# Patient Record
Sex: FEMALE | Race: BLACK OR AFRICAN AMERICAN | NOT HISPANIC OR LATINO
[De-identification: names, ages, dates, MRNs, and addresses within clinical notes are randomized per-mention and may not be internally consistent; named-entity substitution may affect disease eponyms.]

---

## 2024-01-01 ENCOUNTER — RESULT REVIEW (OUTPATIENT)
Age: 59
End: 2024-01-01

## 2024-01-01 ENCOUNTER — NON-APPOINTMENT (OUTPATIENT)
Age: 59
End: 2024-01-01

## 2024-01-01 ENCOUNTER — INPATIENT (INPATIENT)
Facility: HOSPITAL | Age: 59
LOS: 3 days | End: 2024-12-06
Attending: STUDENT IN AN ORGANIZED HEALTH CARE EDUCATION/TRAINING PROGRAM | Admitting: STUDENT IN AN ORGANIZED HEALTH CARE EDUCATION/TRAINING PROGRAM
Payer: MEDICAID

## 2024-01-01 ENCOUNTER — APPOINTMENT (OUTPATIENT)
Dept: HEMATOLOGY ONCOLOGY | Facility: CLINIC | Age: 59
End: 2024-01-01

## 2024-01-01 ENCOUNTER — OUTPATIENT (OUTPATIENT)
Dept: OUTPATIENT SERVICES | Facility: HOSPITAL | Age: 59
LOS: 1 days | Discharge: HOME | End: 2024-01-01

## 2024-01-01 ENCOUNTER — APPOINTMENT (OUTPATIENT)
Dept: INFUSION THERAPY | Facility: HOSPITAL | Age: 59
End: 2024-01-01

## 2024-01-01 ENCOUNTER — OUTPATIENT (OUTPATIENT)
Dept: OUTPATIENT SERVICES | Facility: HOSPITAL | Age: 59
LOS: 1 days | End: 2024-01-01

## 2024-01-01 VITALS
SYSTOLIC BLOOD PRESSURE: 110 MMHG | DIASTOLIC BLOOD PRESSURE: 69 MMHG | RESPIRATION RATE: 28 BRPM | WEIGHT: 212.97 LBS | HEIGHT: 65 IN | HEART RATE: 144 BPM | OXYGEN SATURATION: 95 % | TEMPERATURE: 100 F

## 2024-01-01 DIAGNOSIS — Z90.49 ACQUIRED ABSENCE OF OTHER SPECIFIED PARTS OF DIGESTIVE TRACT: Chronic | ICD-10-CM

## 2024-01-01 DIAGNOSIS — C83.30 DIFFUSE LARGE B-CELL LYMPHOMA, UNSPECIFIED SITE: ICD-10-CM

## 2024-01-01 DIAGNOSIS — C84.40 PERIPHERAL T-CELL LYMPHOMA, NOT ELSEWHERE CLASSIFIED, UNSPECIFIED SITE: ICD-10-CM

## 2024-01-01 DIAGNOSIS — E78.49 OTHER HYPERLIPIDEMIA: ICD-10-CM

## 2024-01-01 DIAGNOSIS — I10 ESSENTIAL (PRIMARY) HYPERTENSION: ICD-10-CM

## 2024-01-01 DIAGNOSIS — E11.10 TYPE 2 DIABETES MELLITUS WITH KETOACIDOSIS WITHOUT COMA: ICD-10-CM

## 2024-01-01 DIAGNOSIS — R06.02 SHORTNESS OF BREATH: ICD-10-CM

## 2024-01-01 DIAGNOSIS — Z51.89 ENCOUNTER FOR OTHER SPECIFIED AFTERCARE: ICD-10-CM

## 2024-01-01 DIAGNOSIS — Z51.11 ENCOUNTER FOR ANTINEOPLASTIC CHEMOTHERAPY: ICD-10-CM

## 2024-01-01 DIAGNOSIS — C85.90 NON-HODGKIN LYMPHOMA, UNSPECIFIED, UNSPECIFIED SITE: ICD-10-CM

## 2024-01-01 DIAGNOSIS — E11.65 TYPE 2 DIABETES MELLITUS WITH HYPERGLYCEMIA: ICD-10-CM

## 2024-01-01 DIAGNOSIS — E86.0 DEHYDRATION: ICD-10-CM

## 2024-01-01 DIAGNOSIS — I95.9 HYPOTENSION, UNSPECIFIED: ICD-10-CM

## 2024-01-01 DIAGNOSIS — R11.2 NAUSEA WITH VOMITING, UNSPECIFIED: ICD-10-CM

## 2024-01-01 DIAGNOSIS — E11.9 TYPE 2 DIABETES MELLITUS WITHOUT COMPLICATIONS: ICD-10-CM

## 2024-01-01 LAB
-  AMPICILLIN/SULBACTAM: SIGNIFICANT CHANGE UP
-  AMPICILLIN: SIGNIFICANT CHANGE UP
-  AZTREONAM: SIGNIFICANT CHANGE UP
-  CEFAZOLIN: SIGNIFICANT CHANGE UP
-  CEFEPIME: SIGNIFICANT CHANGE UP
-  CEFOXITIN: SIGNIFICANT CHANGE UP
-  CEFTRIAXONE: SIGNIFICANT CHANGE UP
-  CIPROFLOXACIN: SIGNIFICANT CHANGE UP
-  ERTAPENEM: SIGNIFICANT CHANGE UP
-  GENTAMICIN: SIGNIFICANT CHANGE UP
-  IMIPENEM: SIGNIFICANT CHANGE UP
-  LEVOFLOXACIN: SIGNIFICANT CHANGE UP
-  MEROPENEM: SIGNIFICANT CHANGE UP
-  PIPERACILLIN/TAZOBACTAM: SIGNIFICANT CHANGE UP
-  TOBRAMYCIN: SIGNIFICANT CHANGE UP
-  TRIMETHOPRIM/SULFAMETHOXAZOLE: SIGNIFICANT CHANGE UP
24R-OH-CALCIDIOL SERPL-MCNC: 40.7 NG/ML — SIGNIFICANT CHANGE UP (ref 30–80)
A1C WITH ESTIMATED AVERAGE GLUCOSE RESULT: 10.6 % — HIGH (ref 4–5.6)
A1C WITH ESTIMATED AVERAGE GLUCOSE RESULT: 12.2 % — HIGH (ref 4–5.6)
ACANTHOCYTES BLD QL SMEAR: SLIGHT — SIGNIFICANT CHANGE UP
ADD ON TEST-SPECIMEN IN LAB: SIGNIFICANT CHANGE UP
ALBUMIN SERPL ELPH-MCNC: 1.5 G/DL — LOW (ref 3.3–5)
ALBUMIN SERPL ELPH-MCNC: 1.7 G/DL — LOW (ref 3.3–5)
ALBUMIN SERPL ELPH-MCNC: 1.7 G/DL — LOW (ref 3.3–5)
ALBUMIN SERPL ELPH-MCNC: 1.8 G/DL — LOW (ref 3.3–5)
ALBUMIN SERPL ELPH-MCNC: 1.9 G/DL — LOW (ref 3.3–5)
ALBUMIN SERPL ELPH-MCNC: 2 G/DL — LOW (ref 3.3–5)
ALBUMIN SERPL ELPH-MCNC: 2.1 G/DL — LOW (ref 3.3–5)
ALBUMIN SERPL ELPH-MCNC: 2.2 G/DL — LOW (ref 3.3–5)
ALBUMIN SERPL ELPH-MCNC: 2.3 G/DL — LOW (ref 3.3–5)
ALBUMIN SERPL ELPH-MCNC: 2.4 G/DL — LOW (ref 3.3–5)
ALBUMIN SERPL ELPH-MCNC: 2.9 G/DL — LOW (ref 3.3–5)
ALBUMIN SERPL ELPH-MCNC: 3 G/DL — LOW (ref 3.3–5)
ALP SERPL-CCNC: 104 U/L — SIGNIFICANT CHANGE UP (ref 40–120)
ALP SERPL-CCNC: 104 U/L — SIGNIFICANT CHANGE UP (ref 40–120)
ALP SERPL-CCNC: 108 U/L — SIGNIFICANT CHANGE UP (ref 40–120)
ALP SERPL-CCNC: 108 U/L — SIGNIFICANT CHANGE UP (ref 40–120)
ALP SERPL-CCNC: 113 U/L — SIGNIFICANT CHANGE UP (ref 40–120)
ALP SERPL-CCNC: 137 U/L — HIGH (ref 40–120)
ALP SERPL-CCNC: 76 U/L — SIGNIFICANT CHANGE UP (ref 40–120)
ALP SERPL-CCNC: 77 U/L — SIGNIFICANT CHANGE UP (ref 40–120)
ALP SERPL-CCNC: 77 U/L — SIGNIFICANT CHANGE UP (ref 40–120)
ALP SERPL-CCNC: 79 U/L — SIGNIFICANT CHANGE UP (ref 40–120)
ALP SERPL-CCNC: 80 U/L — SIGNIFICANT CHANGE UP (ref 40–120)
ALP SERPL-CCNC: 82 U/L — SIGNIFICANT CHANGE UP (ref 40–120)
ALP SERPL-CCNC: 82 U/L — SIGNIFICANT CHANGE UP (ref 40–120)
ALP SERPL-CCNC: 83 U/L — SIGNIFICANT CHANGE UP (ref 40–120)
ALP SERPL-CCNC: 83 U/L — SIGNIFICANT CHANGE UP (ref 40–120)
ALP SERPL-CCNC: 84 U/L — SIGNIFICANT CHANGE UP (ref 40–120)
ALP SERPL-CCNC: 85 U/L — SIGNIFICANT CHANGE UP (ref 40–120)
ALP SERPL-CCNC: 85 U/L — SIGNIFICANT CHANGE UP (ref 40–120)
ALP SERPL-CCNC: 87 U/L — SIGNIFICANT CHANGE UP (ref 40–120)
ALP SERPL-CCNC: 87 U/L — SIGNIFICANT CHANGE UP (ref 40–120)
ALP SERPL-CCNC: 89 U/L — SIGNIFICANT CHANGE UP (ref 40–120)
ALP SERPL-CCNC: 94 U/L — SIGNIFICANT CHANGE UP (ref 40–120)
ALT FLD-CCNC: 15 U/L — SIGNIFICANT CHANGE UP (ref 4–33)
ALT FLD-CCNC: 16 U/L — SIGNIFICANT CHANGE UP (ref 4–33)
ALT FLD-CCNC: 16 U/L — SIGNIFICANT CHANGE UP (ref 4–33)
ALT FLD-CCNC: 17 U/L — SIGNIFICANT CHANGE UP (ref 4–33)
ALT FLD-CCNC: 18 U/L — SIGNIFICANT CHANGE UP (ref 4–33)
ALT FLD-CCNC: 19 U/L — SIGNIFICANT CHANGE UP (ref 4–33)
ALT FLD-CCNC: 20 U/L — SIGNIFICANT CHANGE UP (ref 4–33)
ALT FLD-CCNC: 21 U/L — SIGNIFICANT CHANGE UP (ref 4–33)
ALT FLD-CCNC: 25 U/L — SIGNIFICANT CHANGE UP (ref 4–33)
ALT FLD-CCNC: 25 U/L — SIGNIFICANT CHANGE UP (ref 4–33)
ALT FLD-CCNC: 27 U/L — SIGNIFICANT CHANGE UP (ref 4–33)
ALT FLD-CCNC: 30 U/L — SIGNIFICANT CHANGE UP (ref 4–33)
ALT FLD-CCNC: 33 U/L — SIGNIFICANT CHANGE UP (ref 4–33)
ALT FLD-CCNC: 37 U/L — SIGNIFICANT CHANGE UP (ref 10–45)
ALT FLD-CCNC: <5 U/L — SIGNIFICANT CHANGE UP (ref 4–33)
ALT FLD-CCNC: SIGNIFICANT CHANGE UP U/L (ref 4–33)
ALT FLD-CCNC: SIGNIFICANT CHANGE UP U/L (ref 4–33)
AMMONIA BLD-MCNC: 17 UMOL/L — SIGNIFICANT CHANGE UP (ref 11–55)
AMYLASE P1 CFR SERPL: 136 U/L — HIGH (ref 25–125)
ANION GAP SERPL CALC-SCNC: 12 MMOL/L — SIGNIFICANT CHANGE UP (ref 7–14)
ANION GAP SERPL CALC-SCNC: 13 MMOL/L — SIGNIFICANT CHANGE UP (ref 7–14)
ANION GAP SERPL CALC-SCNC: 14 MMOL/L — SIGNIFICANT CHANGE UP (ref 7–14)
ANION GAP SERPL CALC-SCNC: 15 MMOL/L — HIGH (ref 7–14)
ANION GAP SERPL CALC-SCNC: 15 MMOL/L — HIGH (ref 7–14)
ANION GAP SERPL CALC-SCNC: 18 MMOL/L — HIGH (ref 7–14)
ANION GAP SERPL CALC-SCNC: 19 MMOL/L — HIGH (ref 7–14)
ANION GAP SERPL CALC-SCNC: 20 MMOL/L — HIGH (ref 7–14)
ANION GAP SERPL CALC-SCNC: 21 MMOL/L — HIGH (ref 7–14)
ANION GAP SERPL CALC-SCNC: 23 MMOL/L — HIGH (ref 7–14)
ANION GAP SERPL CALC-SCNC: 24 MMOL/L — HIGH (ref 7–14)
ANION GAP SERPL CALC-SCNC: 26 MMOL/L — HIGH (ref 7–14)
ANION GAP SERPL CALC-SCNC: 27 MMOL/L — HIGH (ref 5–17)
ANION GAP SERPL CALC-SCNC: 28 MMOL/L — HIGH (ref 7–14)
ANION GAP SERPL CALC-SCNC: 30 MMOL/L — HIGH (ref 7–14)
ANION GAP SERPL CALC-SCNC: 31 MMOL/L — HIGH (ref 7–14)
ANION GAP SERPL CALC-SCNC: 31 MMOL/L — HIGH (ref 7–14)
ANION GAP SERPL CALC-SCNC: 35 MMOL/L — HIGH (ref 7–14)
ANION GAP SERPL CALC-SCNC: >27 MMOL/L — HIGH (ref 7–14)
ANISOCYTOSIS BLD QL: SLIGHT — SIGNIFICANT CHANGE UP
ANISOCYTOSIS BLD QL: SLIGHT — SIGNIFICANT CHANGE UP
APPEARANCE UR: CLEAR — SIGNIFICANT CHANGE UP
APTT BLD: 24.7 SEC — SIGNIFICANT CHANGE UP (ref 24.5–35.6)
APTT BLD: 26.6 SEC — SIGNIFICANT CHANGE UP (ref 24.5–35.6)
APTT BLD: 26.8 SEC — SIGNIFICANT CHANGE UP (ref 24.5–35.6)
APTT BLD: 28.1 SEC — SIGNIFICANT CHANGE UP (ref 24.5–35.6)
APTT BLD: 28.1 SEC — SIGNIFICANT CHANGE UP (ref 24.5–35.6)
APTT BLD: 30.6 SEC — SIGNIFICANT CHANGE UP (ref 24.5–35.6)
APTT BLD: 30.7 SEC — SIGNIFICANT CHANGE UP (ref 24.5–35.6)
APTT BLD: 32.8 SEC — SIGNIFICANT CHANGE UP (ref 24.5–35.6)
APTT BLD: 34.1 SEC — SIGNIFICANT CHANGE UP (ref 24.5–35.6)
APTT BLD: 35.1 SEC — SIGNIFICANT CHANGE UP (ref 24.5–35.6)
APTT BLD: 37.2 SEC — HIGH (ref 24.5–35.6)
AST SERPL-CCNC: 10 U/L — SIGNIFICANT CHANGE UP (ref 4–32)
AST SERPL-CCNC: 124 U/L — HIGH (ref 4–32)
AST SERPL-CCNC: 13 U/L — SIGNIFICANT CHANGE UP (ref 4–32)
AST SERPL-CCNC: 15 U/L — SIGNIFICANT CHANGE UP (ref 4–32)
AST SERPL-CCNC: 20 U/L — SIGNIFICANT CHANGE UP (ref 4–32)
AST SERPL-CCNC: 23 U/L — SIGNIFICANT CHANGE UP (ref 4–32)
AST SERPL-CCNC: 27 U/L — SIGNIFICANT CHANGE UP (ref 4–32)
AST SERPL-CCNC: 29 U/L — SIGNIFICANT CHANGE UP (ref 4–32)
AST SERPL-CCNC: 35 U/L — HIGH (ref 4–32)
AST SERPL-CCNC: 38 U/L — HIGH (ref 4–32)
AST SERPL-CCNC: 41 U/L — HIGH (ref 10–40)
AST SERPL-CCNC: 42 U/L — HIGH (ref 4–32)
AST SERPL-CCNC: 46 U/L — HIGH (ref 4–32)
AST SERPL-CCNC: 50 U/L — HIGH (ref 4–32)
AST SERPL-CCNC: 77 U/L — HIGH (ref 4–32)
AST SERPL-CCNC: 8 U/L — SIGNIFICANT CHANGE UP (ref 4–32)
AST SERPL-CCNC: 8 U/L — SIGNIFICANT CHANGE UP (ref 4–32)
AST SERPL-CCNC: 9 U/L — SIGNIFICANT CHANGE UP (ref 4–32)
AST SERPL-CCNC: <5 U/L — SIGNIFICANT CHANGE UP (ref 4–32)
AST SERPL-CCNC: SIGNIFICANT CHANGE UP U/L (ref 4–32)
AST SERPL-CCNC: SIGNIFICANT CHANGE UP U/L (ref 4–32)
B-OH-BUTYR SERPL-SCNC: 0.2 MMOL/L — SIGNIFICANT CHANGE UP (ref 0–0.4)
B-OH-BUTYR SERPL-SCNC: 0.3 MMOL/L — SIGNIFICANT CHANGE UP (ref 0–0.4)
B-OH-BUTYR SERPL-SCNC: 0.3 MMOL/L — SIGNIFICANT CHANGE UP (ref 0–0.4)
B-OH-BUTYR SERPL-SCNC: 0.4 MMOL/L — SIGNIFICANT CHANGE UP (ref 0–0.4)
B-OH-BUTYR SERPL-SCNC: 1.4 MMOL/L — HIGH (ref 0–0.4)
B-OH-BUTYR SERPL-SCNC: 2.1 MMOL/L — HIGH (ref 0–0.4)
B-OH-BUTYR SERPL-SCNC: 6.2 MMOL/L — HIGH (ref 0–0.4)
B-OH-BUTYR SERPL-SCNC: <0 MMOL/L — SIGNIFICANT CHANGE UP (ref 0–0.4)
BACTERIA # UR AUTO: ABNORMAL /HPF
BASOPHILS # BLD AUTO: 0 K/UL — SIGNIFICANT CHANGE UP (ref 0–0.2)
BASOPHILS # BLD AUTO: 0.01 K/UL — SIGNIFICANT CHANGE UP (ref 0–0.2)
BASOPHILS # BLD AUTO: 0.02 K/UL — SIGNIFICANT CHANGE UP (ref 0–0.2)
BASOPHILS # BLD AUTO: 0.03 K/UL — SIGNIFICANT CHANGE UP (ref 0–0.2)
BASOPHILS # BLD AUTO: 0.03 K/UL — SIGNIFICANT CHANGE UP (ref 0–0.2)
BASOPHILS NFR BLD AUTO: 0 % — SIGNIFICANT CHANGE UP (ref 0–2)
BASOPHILS NFR BLD AUTO: 0.6 % — SIGNIFICANT CHANGE UP (ref 0–2)
BASOPHILS NFR BLD AUTO: 1.8 % — SIGNIFICANT CHANGE UP (ref 0–2)
BASOPHILS NFR BLD AUTO: 2.6 % — HIGH (ref 0–2)
BASOPHILS NFR BLD AUTO: 2.6 % — HIGH (ref 0–2)
BASOPHILS NFR BLD AUTO: 3.1 % — HIGH (ref 0–2)
BASOPHILS NFR BLD AUTO: 3.3 % — HIGH (ref 0–2)
BASOPHILS NFR BLD AUTO: 3.4 % — HIGH (ref 0–2)
BASOPHILS NFR BLD AUTO: 4.5 % — HIGH (ref 0–2)
BASOPHILS NFR BLD AUTO: 4.7 % — HIGH (ref 0–2)
BASOPHILS NFR BLD AUTO: 9.1 % — HIGH (ref 0–2)
BILIRUB SERPL-MCNC: 0.4 MG/DL — SIGNIFICANT CHANGE UP (ref 0.2–1.2)
BILIRUB SERPL-MCNC: 0.5 MG/DL — SIGNIFICANT CHANGE UP (ref 0.2–1.2)
BILIRUB SERPL-MCNC: 0.6 MG/DL — SIGNIFICANT CHANGE UP (ref 0.2–1.2)
BILIRUB SERPL-MCNC: 0.7 MG/DL — SIGNIFICANT CHANGE UP (ref 0.2–1.2)
BILIRUB SERPL-MCNC: 0.8 MG/DL — SIGNIFICANT CHANGE UP (ref 0.2–1.2)
BILIRUB SERPL-MCNC: 0.9 MG/DL — SIGNIFICANT CHANGE UP (ref 0.2–1.2)
BILIRUB SERPL-MCNC: 0.9 MG/DL — SIGNIFICANT CHANGE UP (ref 0.2–1.2)
BILIRUB SERPL-MCNC: 1 MG/DL — SIGNIFICANT CHANGE UP (ref 0.2–1.2)
BILIRUB UR-MCNC: NEGATIVE — SIGNIFICANT CHANGE UP
BLD GP AB SCN SERPL QL: NEGATIVE — SIGNIFICANT CHANGE UP
BLOOD GAS ARTERIAL COMPREHENSIVE RESULT: SIGNIFICANT CHANGE UP
BLOOD GAS VENOUS COMPREHENSIVE RESULT: SIGNIFICANT CHANGE UP
BUN SERPL-MCNC: 11 MG/DL — SIGNIFICANT CHANGE UP (ref 7–23)
BUN SERPL-MCNC: 13 MG/DL — SIGNIFICANT CHANGE UP (ref 7–23)
BUN SERPL-MCNC: 14 MG/DL — SIGNIFICANT CHANGE UP (ref 7–23)
BUN SERPL-MCNC: 16 MG/DL — SIGNIFICANT CHANGE UP (ref 7–23)
BUN SERPL-MCNC: 19 MG/DL — SIGNIFICANT CHANGE UP (ref 7–23)
BUN SERPL-MCNC: 2 MG/DL — LOW (ref 7–23)
BUN SERPL-MCNC: 21 MG/DL — SIGNIFICANT CHANGE UP (ref 7–23)
BUN SERPL-MCNC: 22 MG/DL — SIGNIFICANT CHANGE UP (ref 7–23)
BUN SERPL-MCNC: 3 MG/DL — LOW (ref 7–23)
BUN SERPL-MCNC: 3 MG/DL — LOW (ref 7–23)
BUN SERPL-MCNC: 4 MG/DL — LOW (ref 7–23)
BUN SERPL-MCNC: 4 MG/DL — LOW (ref 7–23)
BUN SERPL-MCNC: 5 MG/DL — LOW (ref 7–23)
BUN SERPL-MCNC: 6 MG/DL — LOW (ref 7–23)
BUN SERPL-MCNC: 7 MG/DL — SIGNIFICANT CHANGE UP (ref 7–23)
BUN SERPL-MCNC: 9 MG/DL — SIGNIFICANT CHANGE UP (ref 7–23)
BUN SERPL-MCNC: <2 MG/DL — LOW (ref 7–23)
BURR CELLS BLD QL SMEAR: PRESENT — SIGNIFICANT CHANGE UP
BURR CELLS BLD QL SMEAR: PRESENT — SIGNIFICANT CHANGE UP
C DIFF GDH STL QL: NEGATIVE — SIGNIFICANT CHANGE UP
C DIFF GDH STL QL: SIGNIFICANT CHANGE UP
CA-I BLD-SCNC: 0.9 MMOL/L — LOW (ref 1.15–1.29)
CA-I BLD-SCNC: 0.94 MMOL/L — LOW (ref 1.15–1.29)
CA-I BLD-SCNC: 0.95 MMOL/L — LOW (ref 1.15–1.29)
CA-I BLD-SCNC: 0.96 MMOL/L — LOW (ref 1.15–1.29)
CA-I BLD-SCNC: 0.98 MMOL/L — LOW (ref 1.15–1.29)
CALCIUM SERPL-MCNC: 10.1 MG/DL — SIGNIFICANT CHANGE UP (ref 8.4–10.5)
CALCIUM SERPL-MCNC: 6.5 MG/DL — CRITICAL LOW (ref 8.4–10.5)
CALCIUM SERPL-MCNC: 6.6 MG/DL — LOW (ref 8.4–10.5)
CALCIUM SERPL-MCNC: 6.9 MG/DL — LOW (ref 8.4–10.5)
CALCIUM SERPL-MCNC: 7.1 MG/DL — LOW (ref 8.4–10.5)
CALCIUM SERPL-MCNC: 7.3 MG/DL — LOW (ref 8.4–10.5)
CALCIUM SERPL-MCNC: 7.5 MG/DL — LOW (ref 8.4–10.5)
CALCIUM SERPL-MCNC: 7.5 MG/DL — LOW (ref 8.4–10.5)
CALCIUM SERPL-MCNC: 7.7 MG/DL — LOW (ref 8.4–10.5)
CALCIUM SERPL-MCNC: 7.8 MG/DL — LOW (ref 8.4–10.5)
CALCIUM SERPL-MCNC: 7.9 MG/DL — LOW (ref 8.4–10.5)
CALCIUM SERPL-MCNC: 8.2 MG/DL — LOW (ref 8.4–10.5)
CALCIUM SERPL-MCNC: 8.4 MG/DL — SIGNIFICANT CHANGE UP (ref 8.4–10.5)
CALCIUM SERPL-MCNC: 8.5 MG/DL — SIGNIFICANT CHANGE UP (ref 8.4–10.5)
CALCIUM SERPL-MCNC: 8.8 MG/DL — SIGNIFICANT CHANGE UP (ref 8.4–10.5)
CALCIUM SERPL-MCNC: 8.9 MG/DL — SIGNIFICANT CHANGE UP (ref 8.4–10.5)
CALCIUM SERPL-MCNC: 9 MG/DL — SIGNIFICANT CHANGE UP (ref 8.4–10.5)
CALCIUM SERPL-MCNC: 9 MG/DL — SIGNIFICANT CHANGE UP (ref 8.4–10.5)
CALCIUM SERPL-MCNC: 9.2 MG/DL — SIGNIFICANT CHANGE UP (ref 8.4–10.5)
CHLORIDE SERPL-SCNC: 101 MMOL/L — SIGNIFICANT CHANGE UP (ref 98–107)
CHLORIDE SERPL-SCNC: 103 MMOL/L — SIGNIFICANT CHANGE UP (ref 98–107)
CHLORIDE SERPL-SCNC: 104 MMOL/L — SIGNIFICANT CHANGE UP (ref 98–107)
CHLORIDE SERPL-SCNC: 104 MMOL/L — SIGNIFICANT CHANGE UP (ref 98–107)
CHLORIDE SERPL-SCNC: 106 MMOL/L — SIGNIFICANT CHANGE UP (ref 98–107)
CHLORIDE SERPL-SCNC: 107 MMOL/L — SIGNIFICANT CHANGE UP (ref 98–107)
CHLORIDE SERPL-SCNC: 110 MMOL/L — HIGH (ref 98–107)
CHLORIDE SERPL-SCNC: 112 MMOL/L — HIGH (ref 98–107)
CHLORIDE SERPL-SCNC: 113 MMOL/L — HIGH (ref 98–107)
CHLORIDE SERPL-SCNC: 115 MMOL/L — HIGH (ref 98–107)
CHLORIDE SERPL-SCNC: 115 MMOL/L — HIGH (ref 98–107)
CHLORIDE SERPL-SCNC: 116 MMOL/L — HIGH (ref 98–107)
CHLORIDE SERPL-SCNC: 116 MMOL/L — HIGH (ref 98–107)
CHLORIDE SERPL-SCNC: 117 MMOL/L — HIGH (ref 98–107)
CHLORIDE SERPL-SCNC: 118 MMOL/L — HIGH (ref 98–107)
CHLORIDE SERPL-SCNC: 119 MMOL/L — HIGH (ref 98–107)
CHLORIDE SERPL-SCNC: 120 MMOL/L — HIGH (ref 98–107)
CHLORIDE SERPL-SCNC: 120 MMOL/L — HIGH (ref 98–107)
CHLORIDE SERPL-SCNC: 121 MMOL/L — HIGH (ref 98–107)
CHLORIDE SERPL-SCNC: 82 MMOL/L — LOW (ref 96–108)
CHLORIDE SERPL-SCNC: 93 MMOL/L — LOW (ref 98–107)
CHOLEST SERPL-MCNC: 84 MG/DL — SIGNIFICANT CHANGE UP
CO2 SERPL-SCNC: 10 MMOL/L — CRITICAL LOW (ref 22–31)
CO2 SERPL-SCNC: 11 MMOL/L — LOW (ref 22–31)
CO2 SERPL-SCNC: 13 MMOL/L — LOW (ref 22–31)
CO2 SERPL-SCNC: 13 MMOL/L — LOW (ref 22–31)
CO2 SERPL-SCNC: 14 MMOL/L — LOW (ref 22–31)
CO2 SERPL-SCNC: 15 MMOL/L — LOW (ref 22–31)
CO2 SERPL-SCNC: 15 MMOL/L — LOW (ref 22–31)
CO2 SERPL-SCNC: 16 MMOL/L — LOW (ref 22–31)
CO2 SERPL-SCNC: 16 MMOL/L — LOW (ref 22–31)
CO2 SERPL-SCNC: 17 MMOL/L — LOW (ref 22–31)
CO2 SERPL-SCNC: 18 MMOL/L — LOW (ref 22–31)
CO2 SERPL-SCNC: 7 MMOL/L — CRITICAL LOW (ref 22–31)
CO2 SERPL-SCNC: 8 MMOL/L — CRITICAL LOW (ref 22–31)
CO2 SERPL-SCNC: 9 MMOL/L — CRITICAL LOW (ref 22–31)
CO2 SERPL-SCNC: 9 MMOL/L — CRITICAL LOW (ref 22–31)
CO2 SERPL-SCNC: <7 MMOL/L — CRITICAL LOW (ref 22–31)
COLOR SPEC: YELLOW — SIGNIFICANT CHANGE UP
CORTIS F PM SERPL-MCNC: 13.5 UG/DL — HIGH (ref 2.7–10.5)
CREAT ?TM UR-MCNC: 5 MG/DL — SIGNIFICANT CHANGE UP
CREAT SERPL-MCNC: 0.37 MG/DL — LOW (ref 0.5–1.3)
CREAT SERPL-MCNC: 0.4 MG/DL — LOW (ref 0.5–1.3)
CREAT SERPL-MCNC: 0.4 MG/DL — LOW (ref 0.5–1.3)
CREAT SERPL-MCNC: 0.41 MG/DL — LOW (ref 0.5–1.3)
CREAT SERPL-MCNC: 0.41 MG/DL — LOW (ref 0.5–1.3)
CREAT SERPL-MCNC: 0.43 MG/DL — LOW (ref 0.5–1.3)
CREAT SERPL-MCNC: 0.44 MG/DL — LOW (ref 0.5–1.3)
CREAT SERPL-MCNC: 0.44 MG/DL — LOW (ref 0.5–1.3)
CREAT SERPL-MCNC: 0.45 MG/DL — LOW (ref 0.5–1.3)
CREAT SERPL-MCNC: 0.45 MG/DL — LOW (ref 0.5–1.3)
CREAT SERPL-MCNC: 0.57 MG/DL — SIGNIFICANT CHANGE UP (ref 0.5–1.3)
CREAT SERPL-MCNC: 0.58 MG/DL — SIGNIFICANT CHANGE UP (ref 0.5–1.3)
CREAT SERPL-MCNC: 0.62 MG/DL — SIGNIFICANT CHANGE UP (ref 0.5–1.3)
CREAT SERPL-MCNC: 0.64 MG/DL — SIGNIFICANT CHANGE UP (ref 0.5–1.3)
CREAT SERPL-MCNC: 0.7 MG/DL — SIGNIFICANT CHANGE UP (ref 0.5–1.3)
CREAT SERPL-MCNC: 0.7 MG/DL — SIGNIFICANT CHANGE UP (ref 0.5–1.3)
CREAT SERPL-MCNC: 0.77 MG/DL — SIGNIFICANT CHANGE UP (ref 0.5–1.3)
CREAT SERPL-MCNC: 0.84 MG/DL — SIGNIFICANT CHANGE UP (ref 0.5–1.3)
CREAT SERPL-MCNC: 0.88 MG/DL — SIGNIFICANT CHANGE UP (ref 0.5–1.3)
CREAT SERPL-MCNC: 0.89 MG/DL — SIGNIFICANT CHANGE UP (ref 0.5–1.3)
CREAT SERPL-MCNC: 0.89 MG/DL — SIGNIFICANT CHANGE UP (ref 0.5–1.3)
CREAT SERPL-MCNC: 0.92 MG/DL — SIGNIFICANT CHANGE UP (ref 0.5–1.3)
CREAT SERPL-MCNC: 0.94 MG/DL — SIGNIFICANT CHANGE UP (ref 0.5–1.3)
CREAT SERPL-MCNC: 1.15 MG/DL — SIGNIFICANT CHANGE UP (ref 0.5–1.3)
CREAT SERPL-MCNC: 1.2 MG/DL — SIGNIFICANT CHANGE UP (ref 0.5–1.3)
CULTURE RESULTS: ABNORMAL
CULTURE RESULTS: ABNORMAL
CULTURE RESULTS: SIGNIFICANT CHANGE UP
CULTURE RESULTS: SIGNIFICANT CHANGE UP
D DIMER BLD IA.RAPID-MCNC: 6642 NG/ML DDU — HIGH
D DIMER BLD IA.RAPID-MCNC: 6727 NG/ML DDU — HIGH
DACRYOCYTES BLD QL SMEAR: SLIGHT — SIGNIFICANT CHANGE UP
DIFF PNL FLD: NEGATIVE — SIGNIFICANT CHANGE UP
E COLI DNA BLD POS QL NAA+NON-PROBE: SIGNIFICANT CHANGE UP
EGFR: 100 ML/MIN/1.73M2 — SIGNIFICANT CHANGE UP
EGFR: 100 ML/MIN/1.73M2 — SIGNIFICANT CHANGE UP
EGFR: 102 ML/MIN/1.73M2 — SIGNIFICANT CHANGE UP
EGFR: 103 ML/MIN/1.73M2 — SIGNIFICANT CHANGE UP
EGFR: 105 ML/MIN/1.73M2 — SIGNIFICANT CHANGE UP
EGFR: 105 ML/MIN/1.73M2 — SIGNIFICANT CHANGE UP
EGFR: 111 ML/MIN/1.73M2 — SIGNIFICANT CHANGE UP
EGFR: 111 ML/MIN/1.73M2 — SIGNIFICANT CHANGE UP
EGFR: 112 ML/MIN/1.73M2 — SIGNIFICANT CHANGE UP
EGFR: 112 ML/MIN/1.73M2 — SIGNIFICANT CHANGE UP
EGFR: 113 ML/MIN/1.73M2 — SIGNIFICANT CHANGE UP
EGFR: 114 ML/MIN/1.73M2 — SIGNIFICANT CHANGE UP
EGFR: 114 ML/MIN/1.73M2 — SIGNIFICANT CHANGE UP
EGFR: 115 ML/MIN/1.73M2 — SIGNIFICANT CHANGE UP
EGFR: 115 ML/MIN/1.73M2 — SIGNIFICANT CHANGE UP
EGFR: 117 ML/MIN/1.73M2 — SIGNIFICANT CHANGE UP
EGFR: 52 ML/MIN/1.73M2 — LOW
EGFR: 56 ML/MIN/1.73M2 — LOW
EGFR: 56 ML/MIN/1.73M2 — LOW
EGFR: 70 ML/MIN/1.73M2 — SIGNIFICANT CHANGE UP
EGFR: 72 ML/MIN/1.73M2 — SIGNIFICANT CHANGE UP
EGFR: 75 ML/MIN/1.73M2 — SIGNIFICANT CHANGE UP
EGFR: 75 ML/MIN/1.73M2 — SIGNIFICANT CHANGE UP
EGFR: 76 ML/MIN/1.73M2 — SIGNIFICANT CHANGE UP
EGFR: 80 ML/MIN/1.73M2 — SIGNIFICANT CHANGE UP
EGFR: 89 ML/MIN/1.73M2 — SIGNIFICANT CHANGE UP
EOSINOPHIL # BLD AUTO: 0 K/UL — SIGNIFICANT CHANGE UP (ref 0–0.5)
EOSINOPHIL # BLD AUTO: 0.03 K/UL — SIGNIFICANT CHANGE UP (ref 0–0.5)
EOSINOPHIL NFR BLD AUTO: 0 % — SIGNIFICANT CHANGE UP (ref 0–6)
EOSINOPHIL NFR BLD AUTO: 0.6 % — SIGNIFICANT CHANGE UP (ref 0–6)
ESTIMATED AVERAGE GLUCOSE: 258 — SIGNIFICANT CHANGE UP
ESTIMATED AVERAGE GLUCOSE: 303 — SIGNIFICANT CHANGE UP
FIBRINOGEN PPP-MCNC: 604 MG/DL — HIGH (ref 200–465)
FLUAV AG NPH QL: SIGNIFICANT CHANGE UP
FLUBV AG NPH QL: SIGNIFICANT CHANGE UP
G6PD RBC-CCNC: 11.4 U/G HGB — SIGNIFICANT CHANGE UP (ref 7–20.5)
GAD65 AB SER-MCNC: 0 NMOL/L — SIGNIFICANT CHANGE UP
GAS PNL BLDV: SIGNIFICANT CHANGE UP
GI PCR PANEL: SIGNIFICANT CHANGE UP
GIANT PLATELETS BLD QL SMEAR: PRESENT — SIGNIFICANT CHANGE UP
GLUCOSE BLDC GLUCOMTR-MCNC: 103 MG/DL — HIGH (ref 70–99)
GLUCOSE BLDC GLUCOMTR-MCNC: 104 MG/DL — HIGH (ref 70–99)
GLUCOSE BLDC GLUCOMTR-MCNC: 104 MG/DL — HIGH (ref 70–99)
GLUCOSE BLDC GLUCOMTR-MCNC: 111 MG/DL — HIGH (ref 70–99)
GLUCOSE BLDC GLUCOMTR-MCNC: 115 MG/DL — HIGH (ref 70–99)
GLUCOSE BLDC GLUCOMTR-MCNC: 119 MG/DL — HIGH (ref 70–99)
GLUCOSE BLDC GLUCOMTR-MCNC: 120 MG/DL — HIGH (ref 70–99)
GLUCOSE BLDC GLUCOMTR-MCNC: 122 MG/DL — HIGH (ref 70–99)
GLUCOSE BLDC GLUCOMTR-MCNC: 124 MG/DL — HIGH (ref 70–99)
GLUCOSE BLDC GLUCOMTR-MCNC: 128 MG/DL — HIGH (ref 70–99)
GLUCOSE BLDC GLUCOMTR-MCNC: 129 MG/DL — HIGH (ref 70–99)
GLUCOSE BLDC GLUCOMTR-MCNC: 130 MG/DL — HIGH (ref 70–99)
GLUCOSE BLDC GLUCOMTR-MCNC: 133 MG/DL — HIGH (ref 70–99)
GLUCOSE BLDC GLUCOMTR-MCNC: 134 MG/DL — HIGH (ref 70–99)
GLUCOSE BLDC GLUCOMTR-MCNC: 134 MG/DL — HIGH (ref 70–99)
GLUCOSE BLDC GLUCOMTR-MCNC: 135 MG/DL — HIGH (ref 70–99)
GLUCOSE BLDC GLUCOMTR-MCNC: 137 MG/DL — HIGH (ref 70–99)
GLUCOSE BLDC GLUCOMTR-MCNC: 138 MG/DL — HIGH (ref 70–99)
GLUCOSE BLDC GLUCOMTR-MCNC: 138 MG/DL — HIGH (ref 70–99)
GLUCOSE BLDC GLUCOMTR-MCNC: 139 MG/DL — HIGH (ref 70–99)
GLUCOSE BLDC GLUCOMTR-MCNC: 140 MG/DL — HIGH (ref 70–99)
GLUCOSE BLDC GLUCOMTR-MCNC: 144 MG/DL — HIGH (ref 70–99)
GLUCOSE BLDC GLUCOMTR-MCNC: 147 MG/DL — HIGH (ref 70–99)
GLUCOSE BLDC GLUCOMTR-MCNC: 147 MG/DL — HIGH (ref 70–99)
GLUCOSE BLDC GLUCOMTR-MCNC: 148 MG/DL — HIGH (ref 70–99)
GLUCOSE BLDC GLUCOMTR-MCNC: 149 MG/DL — HIGH (ref 70–99)
GLUCOSE BLDC GLUCOMTR-MCNC: 149 MG/DL — HIGH (ref 70–99)
GLUCOSE BLDC GLUCOMTR-MCNC: 150 MG/DL — HIGH (ref 70–99)
GLUCOSE BLDC GLUCOMTR-MCNC: 151 MG/DL — HIGH (ref 70–99)
GLUCOSE BLDC GLUCOMTR-MCNC: 152 MG/DL — HIGH (ref 70–99)
GLUCOSE BLDC GLUCOMTR-MCNC: 152 MG/DL — HIGH (ref 70–99)
GLUCOSE BLDC GLUCOMTR-MCNC: 153 MG/DL — HIGH (ref 70–99)
GLUCOSE BLDC GLUCOMTR-MCNC: 154 MG/DL — HIGH (ref 70–99)
GLUCOSE BLDC GLUCOMTR-MCNC: 155 MG/DL — HIGH (ref 70–99)
GLUCOSE BLDC GLUCOMTR-MCNC: 159 MG/DL — HIGH (ref 70–99)
GLUCOSE BLDC GLUCOMTR-MCNC: 160 MG/DL — HIGH (ref 70–99)
GLUCOSE BLDC GLUCOMTR-MCNC: 161 MG/DL — HIGH (ref 70–99)
GLUCOSE BLDC GLUCOMTR-MCNC: 162 MG/DL — HIGH (ref 70–99)
GLUCOSE BLDC GLUCOMTR-MCNC: 163 MG/DL — HIGH (ref 70–99)
GLUCOSE BLDC GLUCOMTR-MCNC: 166 MG/DL — HIGH (ref 70–99)
GLUCOSE BLDC GLUCOMTR-MCNC: 172 MG/DL — HIGH (ref 70–99)
GLUCOSE BLDC GLUCOMTR-MCNC: 173 MG/DL — HIGH (ref 70–99)
GLUCOSE BLDC GLUCOMTR-MCNC: 174 MG/DL — HIGH (ref 70–99)
GLUCOSE BLDC GLUCOMTR-MCNC: 177 MG/DL — HIGH (ref 70–99)
GLUCOSE BLDC GLUCOMTR-MCNC: 179 MG/DL — HIGH (ref 70–99)
GLUCOSE BLDC GLUCOMTR-MCNC: 183 MG/DL — HIGH (ref 70–99)
GLUCOSE BLDC GLUCOMTR-MCNC: 183 MG/DL — HIGH (ref 70–99)
GLUCOSE BLDC GLUCOMTR-MCNC: 186 MG/DL — HIGH (ref 70–99)
GLUCOSE BLDC GLUCOMTR-MCNC: 187 MG/DL — HIGH (ref 70–99)
GLUCOSE BLDC GLUCOMTR-MCNC: 188 MG/DL — HIGH (ref 70–99)
GLUCOSE BLDC GLUCOMTR-MCNC: 190 MG/DL — HIGH (ref 70–99)
GLUCOSE BLDC GLUCOMTR-MCNC: 194 MG/DL — HIGH (ref 70–99)
GLUCOSE BLDC GLUCOMTR-MCNC: 196 MG/DL — HIGH (ref 70–99)
GLUCOSE BLDC GLUCOMTR-MCNC: 196 MG/DL — HIGH (ref 70–99)
GLUCOSE BLDC GLUCOMTR-MCNC: 197 MG/DL — HIGH (ref 70–99)
GLUCOSE BLDC GLUCOMTR-MCNC: 198 MG/DL — HIGH (ref 70–99)
GLUCOSE BLDC GLUCOMTR-MCNC: 199 MG/DL — HIGH (ref 70–99)
GLUCOSE BLDC GLUCOMTR-MCNC: 206 MG/DL — HIGH (ref 70–99)
GLUCOSE BLDC GLUCOMTR-MCNC: 208 MG/DL — HIGH (ref 70–99)
GLUCOSE BLDC GLUCOMTR-MCNC: 213 MG/DL — HIGH (ref 70–99)
GLUCOSE BLDC GLUCOMTR-MCNC: 215 MG/DL — HIGH (ref 70–99)
GLUCOSE BLDC GLUCOMTR-MCNC: 220 MG/DL — HIGH (ref 70–99)
GLUCOSE BLDC GLUCOMTR-MCNC: 227 MG/DL — HIGH (ref 70–99)
GLUCOSE BLDC GLUCOMTR-MCNC: 228 MG/DL — HIGH (ref 70–99)
GLUCOSE BLDC GLUCOMTR-MCNC: 233 MG/DL — HIGH (ref 70–99)
GLUCOSE BLDC GLUCOMTR-MCNC: 236 MG/DL — HIGH (ref 70–99)
GLUCOSE BLDC GLUCOMTR-MCNC: 242 MG/DL — HIGH (ref 70–99)
GLUCOSE BLDC GLUCOMTR-MCNC: 247 MG/DL — HIGH (ref 70–99)
GLUCOSE BLDC GLUCOMTR-MCNC: 253 MG/DL — HIGH (ref 70–99)
GLUCOSE BLDC GLUCOMTR-MCNC: 259 MG/DL — HIGH (ref 70–99)
GLUCOSE BLDC GLUCOMTR-MCNC: 263 MG/DL — HIGH (ref 70–99)
GLUCOSE BLDC GLUCOMTR-MCNC: 304 MG/DL — HIGH (ref 70–99)
GLUCOSE BLDC GLUCOMTR-MCNC: 340 MG/DL — HIGH (ref 70–99)
GLUCOSE BLDC GLUCOMTR-MCNC: 341 MG/DL — HIGH (ref 70–99)
GLUCOSE BLDC GLUCOMTR-MCNC: 361 MG/DL — HIGH (ref 70–99)
GLUCOSE BLDC GLUCOMTR-MCNC: 365 MG/DL — HIGH (ref 70–99)
GLUCOSE BLDC GLUCOMTR-MCNC: 365 MG/DL — HIGH (ref 70–99)
GLUCOSE BLDC GLUCOMTR-MCNC: 375 MG/DL — HIGH (ref 70–99)
GLUCOSE BLDC GLUCOMTR-MCNC: 454 MG/DL — CRITICAL HIGH (ref 70–99)
GLUCOSE BLDC GLUCOMTR-MCNC: 472 MG/DL — CRITICAL HIGH (ref 70–99)
GLUCOSE BLDC GLUCOMTR-MCNC: 475 MG/DL — CRITICAL HIGH (ref 70–99)
GLUCOSE SERPL-MCNC: 108 MG/DL — HIGH (ref 70–99)
GLUCOSE SERPL-MCNC: 111 MG/DL — HIGH (ref 70–99)
GLUCOSE SERPL-MCNC: 113 MG/DL — HIGH (ref 70–99)
GLUCOSE SERPL-MCNC: 130 MG/DL — HIGH (ref 70–99)
GLUCOSE SERPL-MCNC: 131 MG/DL — HIGH (ref 70–99)
GLUCOSE SERPL-MCNC: 136 MG/DL — HIGH (ref 70–99)
GLUCOSE SERPL-MCNC: 140 MG/DL — HIGH (ref 70–99)
GLUCOSE SERPL-MCNC: 141 MG/DL — HIGH (ref 70–99)
GLUCOSE SERPL-MCNC: 141 MG/DL — HIGH (ref 70–99)
GLUCOSE SERPL-MCNC: 145 MG/DL — HIGH (ref 70–99)
GLUCOSE SERPL-MCNC: 151 MG/DL — HIGH (ref 70–99)
GLUCOSE SERPL-MCNC: 173 MG/DL — HIGH (ref 70–99)
GLUCOSE SERPL-MCNC: 177 MG/DL — HIGH (ref 70–99)
GLUCOSE SERPL-MCNC: 181 MG/DL — HIGH (ref 70–99)
GLUCOSE SERPL-MCNC: 187 MG/DL — HIGH (ref 70–99)
GLUCOSE SERPL-MCNC: 207 MG/DL — HIGH (ref 70–99)
GLUCOSE SERPL-MCNC: 208 MG/DL — HIGH (ref 70–99)
GLUCOSE SERPL-MCNC: 229 MG/DL — HIGH (ref 70–99)
GLUCOSE SERPL-MCNC: 230 MG/DL — HIGH (ref 70–99)
GLUCOSE SERPL-MCNC: 254 MG/DL — HIGH (ref 70–99)
GLUCOSE SERPL-MCNC: 272 MG/DL — HIGH (ref 70–99)
GLUCOSE SERPL-MCNC: 350 MG/DL — HIGH (ref 70–99)
GLUCOSE SERPL-MCNC: 436 MG/DL — HIGH (ref 70–99)
GLUCOSE SERPL-MCNC: 448 MG/DL — HIGH (ref 70–99)
GLUCOSE SERPL-MCNC: 554 MG/DL — CRITICAL HIGH (ref 70–99)
GLUCOSE UR QL: 250 MG/DL
GLUCOSE UR QL: 500 MG/DL
GLUCOSE UR QL: >=1000 MG/DL
GRAM STN FLD: ABNORMAL
GRAM STN FLD: SIGNIFICANT CHANGE UP
GRAM STN FLD: SIGNIFICANT CHANGE UP
HCT VFR BLD CALC: 15.6 % — CRITICAL LOW (ref 34.5–45)
HCT VFR BLD CALC: 18 % — CRITICAL LOW (ref 34.5–45)
HCT VFR BLD CALC: 19.5 % — CRITICAL LOW (ref 34.5–45)
HCT VFR BLD CALC: 19.5 % — CRITICAL LOW (ref 34.5–45)
HCT VFR BLD CALC: 20.1 % — CRITICAL LOW (ref 34.5–45)
HCT VFR BLD CALC: 20.3 % — CRITICAL LOW (ref 34.5–45)
HCT VFR BLD CALC: 21.4 % — LOW (ref 34.5–45)
HCT VFR BLD CALC: 21.4 % — LOW (ref 34.5–45)
HCT VFR BLD CALC: 22.2 % — LOW (ref 34.5–45)
HCT VFR BLD CALC: 22.5 % — LOW (ref 34.5–45)
HCT VFR BLD CALC: 22.5 % — LOW (ref 34.5–45)
HCT VFR BLD CALC: 22.6 % — LOW (ref 34.5–45)
HCT VFR BLD CALC: 22.7 % — LOW (ref 34.5–45)
HCT VFR BLD CALC: 23 % — LOW (ref 34.5–45)
HCT VFR BLD CALC: 23.2 % — LOW (ref 34.5–45)
HCT VFR BLD CALC: 28.7 % — LOW (ref 34.5–45)
HCT VFR BLD CALC: 33.7 % — LOW (ref 34.5–45)
HDLC SERPL-MCNC: 15 MG/DL — LOW
HGB BLD-MCNC: 10.6 G/DL — LOW (ref 11.5–15.5)
HGB BLD-MCNC: 11.8 G/DL — SIGNIFICANT CHANGE UP (ref 11.5–15.5)
HGB BLD-MCNC: 5.5 G/DL — CRITICAL LOW (ref 11.5–15.5)
HGB BLD-MCNC: 6.5 G/DL — CRITICAL LOW (ref 11.5–15.5)
HGB BLD-MCNC: 7 G/DL — CRITICAL LOW (ref 11.5–15.5)
HGB BLD-MCNC: 7.1 G/DL — LOW (ref 11.5–15.5)
HGB BLD-MCNC: 7.3 G/DL — LOW (ref 11.5–15.5)
HGB BLD-MCNC: 7.3 G/DL — LOW (ref 11.5–15.5)
HGB BLD-MCNC: 7.5 G/DL — LOW (ref 11.5–15.5)
HGB BLD-MCNC: 7.8 G/DL — LOW (ref 11.5–15.5)
HGB BLD-MCNC: 7.9 G/DL — LOW (ref 11.5–15.5)
HGB BLD-MCNC: 8 G/DL — LOW (ref 11.5–15.5)
HGB BLD-MCNC: 8.2 G/DL — LOW (ref 11.5–15.5)
HGB BLD-MCNC: 8.2 G/DL — LOW (ref 11.5–15.5)
HGB BLD-MCNC: 8.5 G/DL — LOW (ref 11.5–15.5)
IANC: 0.01 K/UL — LOW (ref 1.8–7.4)
IANC: 0.01 K/UL — LOW (ref 1.8–7.4)
IANC: 0.12 K/UL — LOW (ref 1.8–7.4)
IANC: 0.12 K/UL — LOW (ref 1.8–7.4)
IANC: 0.13 K/UL — LOW (ref 1.8–7.4)
IANC: 0.15 K/UL — LOW (ref 1.8–7.4)
IANC: 0.17 K/UL — LOW (ref 1.8–7.4)
IANC: 0.22 K/UL — LOW (ref 1.8–7.4)
IANC: 0.33 K/UL — LOW (ref 1.8–7.4)
IANC: 0.35 K/UL — LOW (ref 1.8–7.4)
IANC: 0.45 K/UL — LOW (ref 1.8–7.4)
IANC: 0.46 K/UL — LOW (ref 1.8–7.4)
IMM GRANULOCYTES NFR BLD AUTO: 0 % — SIGNIFICANT CHANGE UP (ref 0–0.9)
IMM GRANULOCYTES NFR BLD AUTO: 1.6 % — HIGH (ref 0–0.9)
IMM GRANULOCYTES NFR BLD AUTO: 17.2 % — HIGH (ref 0–0.9)
IMM GRANULOCYTES NFR BLD AUTO: 21.1 % — HIGH (ref 0–0.9)
IMM GRANULOCYTES NFR BLD AUTO: 4.1 % — HIGH (ref 0–0.9)
IMM GRANULOCYTES NFR BLD AUTO: 4.5 % — HIGH (ref 0–0.9)
IMM GRANULOCYTES NFR BLD AUTO: 6.3 % — HIGH (ref 0–0.9)
INR BLD: 1.06 RATIO — SIGNIFICANT CHANGE UP (ref 0.85–1.16)
INR BLD: 1.08 RATIO — SIGNIFICANT CHANGE UP (ref 0.85–1.16)
INR BLD: 1.09 RATIO — SIGNIFICANT CHANGE UP (ref 0.85–1.16)
INR BLD: 1.11 RATIO — SIGNIFICANT CHANGE UP (ref 0.85–1.16)
INR BLD: 1.12 RATIO — SIGNIFICANT CHANGE UP (ref 0.85–1.16)
INR BLD: 1.19 RATIO — HIGH (ref 0.85–1.16)
INR BLD: 1.22 RATIO — HIGH (ref 0.85–1.16)
INR BLD: 1.23 RATIO — HIGH (ref 0.85–1.16)
INR BLD: 1.24 RATIO — HIGH (ref 0.85–1.16)
INR BLD: 1.24 RATIO — HIGH (ref 0.85–1.16)
INR BLD: 1.27 RATIO — HIGH (ref 0.85–1.16)
ISLET CELL512 AB SER-ACNC: SIGNIFICANT CHANGE UP
ISLET CELL512 AB SER-SCNC: 0 NMOL/L — SIGNIFICANT CHANGE UP
KETONES UR-MCNC: 80 MG/DL
KETONES UR-MCNC: NEGATIVE MG/DL — SIGNIFICANT CHANGE UP
KETONES UR-MCNC: NEGATIVE MG/DL — SIGNIFICANT CHANGE UP
LACTATE SERPL-SCNC: 4.1 MMOL/L — CRITICAL HIGH (ref 0.5–2)
LDH SERPL L TO P-CCNC: 417 U/L — HIGH (ref 135–225)
LDH SERPL L TO P-CCNC: 445 U/L — HIGH (ref 135–225)
LDH SERPL L TO P-CCNC: 480 U/L — HIGH (ref 135–225)
LDH SERPL L TO P-CCNC: 581 U/L — HIGH (ref 135–225)
LDH SERPL L TO P-CCNC: 839 U/L — HIGH (ref 135–225)
LDH SERPL L TO P-CCNC: HIGH U/L (ref 50–242)
LDLC SERPL-MCNC: 46 MG/DL — SIGNIFICANT CHANGE UP
LEUKOCYTE ESTERASE UR-ACNC: NEGATIVE — SIGNIFICANT CHANGE UP
LIDOCAIN IGE QN: 234 U/L — HIGH (ref 7–60)
LIDOCAIN IGE QN: 451 U/L — HIGH (ref 7–60)
LIPID PNL WITH DIRECT LDL SERPL: 46 MG/DL — SIGNIFICANT CHANGE UP
LYMPHOCYTES # BLD AUTO: 0.05 K/UL — LOW (ref 1–3.3)
LYMPHOCYTES # BLD AUTO: 0.07 K/UL — LOW (ref 1–3.3)
LYMPHOCYTES # BLD AUTO: 0.07 K/UL — LOW (ref 1–3.3)
LYMPHOCYTES # BLD AUTO: 0.08 K/UL — LOW (ref 1–3.3)
LYMPHOCYTES # BLD AUTO: 0.08 K/UL — LOW (ref 1–3.3)
LYMPHOCYTES # BLD AUTO: 0.09 K/UL — LOW (ref 1–3.3)
LYMPHOCYTES # BLD AUTO: 0.1 K/UL — LOW (ref 1–3.3)
LYMPHOCYTES # BLD AUTO: 0.11 K/UL — LOW (ref 1–3.3)
LYMPHOCYTES # BLD AUTO: 0.12 K/UL — LOW (ref 1–3.3)
LYMPHOCYTES # BLD AUTO: 0.15 K/UL — LOW (ref 1–3.3)
LYMPHOCYTES # BLD AUTO: 0.16 K/UL — LOW (ref 1–3.3)
LYMPHOCYTES # BLD AUTO: 0.27 K/UL — LOW (ref 1–3.3)
LYMPHOCYTES # BLD AUTO: 0.41 K/UL — LOW (ref 1–3.3)
LYMPHOCYTES # BLD AUTO: 12.5 % — LOW (ref 13–44)
LYMPHOCYTES # BLD AUTO: 16.4 % — SIGNIFICANT CHANGE UP (ref 13–44)
LYMPHOCYTES # BLD AUTO: 26.8 % — SIGNIFICANT CHANGE UP (ref 13–44)
LYMPHOCYTES # BLD AUTO: 29 % — SIGNIFICANT CHANGE UP (ref 13–44)
LYMPHOCYTES # BLD AUTO: 30.8 % — SIGNIFICANT CHANGE UP (ref 13–44)
LYMPHOCYTES # BLD AUTO: 31 % — SIGNIFICANT CHANGE UP (ref 13–44)
LYMPHOCYTES # BLD AUTO: 31.8 % — SIGNIFICANT CHANGE UP (ref 13–44)
LYMPHOCYTES # BLD AUTO: 34.4 % — SIGNIFICANT CHANGE UP (ref 13–44)
LYMPHOCYTES # BLD AUTO: 37.5 % — SIGNIFICANT CHANGE UP (ref 13–44)
LYMPHOCYTES # BLD AUTO: 42.1 % — SIGNIFICANT CHANGE UP (ref 13–44)
LYMPHOCYTES # BLD AUTO: 63.6 % — HIGH (ref 13–44)
LYMPHOCYTES # BLD AUTO: 8.8 % — LOW (ref 13–44)
LYMPHOCYTES # BLD AUTO: 83.3 % — HIGH (ref 13–44)
MACROCYTES BLD QL: SLIGHT — SIGNIFICANT CHANGE UP
MAGNESIUM SERPL-MCNC: 1.2 MG/DL — LOW (ref 1.6–2.6)
MAGNESIUM SERPL-MCNC: 1.5 MG/DL — LOW (ref 1.6–2.6)
MAGNESIUM SERPL-MCNC: 1.6 MG/DL — SIGNIFICANT CHANGE UP (ref 1.6–2.6)
MAGNESIUM SERPL-MCNC: 1.7 MG/DL — SIGNIFICANT CHANGE UP (ref 1.6–2.6)
MAGNESIUM SERPL-MCNC: 1.8 MG/DL — SIGNIFICANT CHANGE UP (ref 1.6–2.6)
MAGNESIUM SERPL-MCNC: 1.9 MG/DL — SIGNIFICANT CHANGE UP (ref 1.6–2.6)
MAGNESIUM SERPL-MCNC: 2 MG/DL — SIGNIFICANT CHANGE UP (ref 1.6–2.6)
MAGNESIUM SERPL-MCNC: 2.1 MG/DL — SIGNIFICANT CHANGE UP (ref 1.6–2.6)
MAGNESIUM SERPL-MCNC: 2.3 MG/DL — SIGNIFICANT CHANGE UP (ref 1.6–2.6)
MAGNESIUM SERPL-MCNC: 2.5 MG/DL — SIGNIFICANT CHANGE UP (ref 1.6–2.6)
MCHC RBC-ENTMCNC: 25.5 PG — LOW (ref 27–34)
MCHC RBC-ENTMCNC: 25.6 PG — LOW (ref 27–34)
MCHC RBC-ENTMCNC: 26 PG — LOW (ref 27–34)
MCHC RBC-ENTMCNC: 26 PG — LOW (ref 27–34)
MCHC RBC-ENTMCNC: 26.4 PG — LOW (ref 27–34)
MCHC RBC-ENTMCNC: 26.4 PG — LOW (ref 27–34)
MCHC RBC-ENTMCNC: 26.5 PG — LOW (ref 27–34)
MCHC RBC-ENTMCNC: 26.8 PG — LOW (ref 27–34)
MCHC RBC-ENTMCNC: 27.6 PG — SIGNIFICANT CHANGE UP (ref 27–34)
MCHC RBC-ENTMCNC: 27.8 PG — SIGNIFICANT CHANGE UP (ref 27–34)
MCHC RBC-ENTMCNC: 27.9 PG — SIGNIFICANT CHANGE UP (ref 27–34)
MCHC RBC-ENTMCNC: 28.6 PG — SIGNIFICANT CHANGE UP (ref 27–34)
MCHC RBC-ENTMCNC: 29 PG — SIGNIFICANT CHANGE UP (ref 27–34)
MCHC RBC-ENTMCNC: 29.3 PG — SIGNIFICANT CHANGE UP (ref 27–34)
MCHC RBC-ENTMCNC: 29.4 PG — SIGNIFICANT CHANGE UP (ref 27–34)
MCHC RBC-ENTMCNC: 30.6 PG — SIGNIFICANT CHANGE UP (ref 27–34)
MCHC RBC-ENTMCNC: 30.9 PG — SIGNIFICANT CHANGE UP (ref 27–34)
MCHC RBC-ENTMCNC: 34.1 G/DL — SIGNIFICANT CHANGE UP (ref 32–36)
MCHC RBC-ENTMCNC: 34.3 G/DL — SIGNIFICANT CHANGE UP (ref 32–36)
MCHC RBC-ENTMCNC: 34.8 G/DL — SIGNIFICANT CHANGE UP (ref 32–36)
MCHC RBC-ENTMCNC: 35 G/DL — SIGNIFICANT CHANGE UP (ref 32–36)
MCHC RBC-ENTMCNC: 35.1 G/DL — SIGNIFICANT CHANGE UP (ref 32–36)
MCHC RBC-ENTMCNC: 35.1 G/DL — SIGNIFICANT CHANGE UP (ref 32–36)
MCHC RBC-ENTMCNC: 35.3 G/DL — SIGNIFICANT CHANGE UP (ref 32–36)
MCHC RBC-ENTMCNC: 35.6 G/DL — SIGNIFICANT CHANGE UP (ref 32–36)
MCHC RBC-ENTMCNC: 35.6 G/DL — SIGNIFICANT CHANGE UP (ref 32–36)
MCHC RBC-ENTMCNC: 35.9 G/DL — SIGNIFICANT CHANGE UP (ref 32–36)
MCHC RBC-ENTMCNC: 36.1 G/DL — HIGH (ref 32–36)
MCHC RBC-ENTMCNC: 36.3 G/DL — HIGH (ref 32–36)
MCHC RBC-ENTMCNC: 36.3 G/DL — HIGH (ref 32–36)
MCHC RBC-ENTMCNC: 36.4 G/DL — HIGH (ref 32–36)
MCHC RBC-ENTMCNC: 36.6 G/DL — HIGH (ref 32–36)
MCHC RBC-ENTMCNC: 36.9 G/DL — HIGH (ref 32–36)
MCV RBC AUTO: 70.5 FL — LOW (ref 80–100)
MCV RBC AUTO: 72.6 FL — LOW (ref 80–100)
MCV RBC AUTO: 72.8 FL — LOW (ref 80–100)
MCV RBC AUTO: 73.5 FL — LOW (ref 80–100)
MCV RBC AUTO: 73.6 FL — LOW (ref 80–100)
MCV RBC AUTO: 73.6 FL — LOW (ref 80–100)
MCV RBC AUTO: 74.4 FL — LOW (ref 80–100)
MCV RBC AUTO: 74.6 FL — LOW (ref 80–100)
MCV RBC AUTO: 74.8 FL — LOW (ref 80–100)
MCV RBC AUTO: 75.3 FL — LOW (ref 80–100)
MCV RBC AUTO: 76.9 FL — LOW (ref 80–100)
MCV RBC AUTO: 77.4 FL — LOW (ref 80–100)
MCV RBC AUTO: 78.2 FL — LOW (ref 80–100)
MCV RBC AUTO: 78.4 FL — LOW (ref 80–100)
MCV RBC AUTO: 79.6 FL — LOW (ref 80–100)
MCV RBC AUTO: 83.5 FL — SIGNIFICANT CHANGE UP (ref 80–100)
MCV RBC AUTO: 85.2 FL — SIGNIFICANT CHANGE UP (ref 80–100)
MCV RBC AUTO: 86.9 FL — SIGNIFICANT CHANGE UP (ref 80–100)
MCV RBC AUTO: 87.2 FL — SIGNIFICANT CHANGE UP (ref 80–100)
METHOD TYPE: SIGNIFICANT CHANGE UP
METHOD TYPE: SIGNIFICANT CHANGE UP
MONOCYTES # BLD AUTO: 0 K/UL — SIGNIFICANT CHANGE UP (ref 0–0.9)
MONOCYTES # BLD AUTO: 0 K/UL — SIGNIFICANT CHANGE UP (ref 0–0.9)
MONOCYTES # BLD AUTO: 0.01 K/UL — SIGNIFICANT CHANGE UP (ref 0–0.9)
MONOCYTES # BLD AUTO: 0.01 K/UL — SIGNIFICANT CHANGE UP (ref 0–0.9)
MONOCYTES # BLD AUTO: 0.02 K/UL — SIGNIFICANT CHANGE UP (ref 0–0.9)
MONOCYTES # BLD AUTO: 0.03 K/UL — SIGNIFICANT CHANGE UP (ref 0–0.9)
MONOCYTES # BLD AUTO: 0.03 K/UL — SIGNIFICANT CHANGE UP (ref 0–0.9)
MONOCYTES # BLD AUTO: 0.04 K/UL — SIGNIFICANT CHANGE UP (ref 0–0.9)
MONOCYTES # BLD AUTO: 0.05 K/UL — SIGNIFICANT CHANGE UP (ref 0–0.9)
MONOCYTES # BLD AUTO: 0.09 K/UL — SIGNIFICANT CHANGE UP (ref 0–0.9)
MONOCYTES # BLD AUTO: 0.44 K/UL — SIGNIFICANT CHANGE UP (ref 0–0.9)
MONOCYTES NFR BLD AUTO: 0 % — LOW (ref 2–14)
MONOCYTES NFR BLD AUTO: 0 % — LOW (ref 2–14)
MONOCYTES NFR BLD AUTO: 10.3 % — SIGNIFICANT CHANGE UP (ref 2–14)
MONOCYTES NFR BLD AUTO: 14.1 % — HIGH (ref 2–14)
MONOCYTES NFR BLD AUTO: 18.2 % — HIGH (ref 2–14)
MONOCYTES NFR BLD AUTO: 2.6 % — SIGNIFICANT CHANGE UP (ref 2–14)
MONOCYTES NFR BLD AUTO: 4.5 % — SIGNIFICANT CHANGE UP (ref 2–14)
MONOCYTES NFR BLD AUTO: 4.9 % — SIGNIFICANT CHANGE UP (ref 2–14)
MONOCYTES NFR BLD AUTO: 6.9 % — SIGNIFICANT CHANGE UP (ref 2–14)
MONOCYTES NFR BLD AUTO: 8.9 % — SIGNIFICANT CHANGE UP (ref 2–14)
MONOCYTES NFR BLD AUTO: 9.4 % — SIGNIFICANT CHANGE UP (ref 2–14)
MONOCYTES NFR BLD AUTO: 9.4 % — SIGNIFICANT CHANGE UP (ref 2–14)
MONOCYTES NFR BLD AUTO: 9.7 % — SIGNIFICANT CHANGE UP (ref 2–14)
MRSA PCR RESULT.: SIGNIFICANT CHANGE UP
NEUTROPHILS # BLD AUTO: 0 K/UL — LOW (ref 1.8–7.4)
NEUTROPHILS # BLD AUTO: 0.01 K/UL — LOW (ref 1.8–7.4)
NEUTROPHILS # BLD AUTO: 0.11 K/UL — LOW (ref 1.8–7.4)
NEUTROPHILS # BLD AUTO: 0.12 K/UL — LOW (ref 1.8–7.4)
NEUTROPHILS # BLD AUTO: 0.12 K/UL — LOW (ref 1.8–7.4)
NEUTROPHILS # BLD AUTO: 0.15 K/UL — LOW (ref 1.8–7.4)
NEUTROPHILS # BLD AUTO: 0.17 K/UL — LOW (ref 1.8–7.4)
NEUTROPHILS # BLD AUTO: 0.22 K/UL — LOW (ref 1.8–7.4)
NEUTROPHILS # BLD AUTO: 0.33 K/UL — LOW (ref 1.8–7.4)
NEUTROPHILS # BLD AUTO: 0.35 K/UL — LOW (ref 1.8–7.4)
NEUTROPHILS # BLD AUTO: 0.44 K/UL — LOW (ref 1.8–7.4)
NEUTROPHILS # BLD AUTO: 0.45 K/UL — LOW (ref 1.8–7.4)
NEUTROPHILS # BLD AUTO: 3.57 K/UL — SIGNIFICANT CHANGE UP (ref 1.8–7.4)
NEUTROPHILS NFR BLD AUTO: 0 % — LOW (ref 43–77)
NEUTROPHILS NFR BLD AUTO: 16.7 % — LOW (ref 43–77)
NEUTROPHILS NFR BLD AUTO: 31.6 % — LOW (ref 43–77)
NEUTROPHILS NFR BLD AUTO: 32.3 % — LOW (ref 43–77)
NEUTROPHILS NFR BLD AUTO: 46.8 % — SIGNIFICANT CHANGE UP (ref 43–77)
NEUTROPHILS NFR BLD AUTO: 51.5 % — SIGNIFICANT CHANGE UP (ref 43–77)
NEUTROPHILS NFR BLD AUTO: 54.7 % — SIGNIFICANT CHANGE UP (ref 43–77)
NEUTROPHILS NFR BLD AUTO: 56.3 % — SIGNIFICANT CHANGE UP (ref 43–77)
NEUTROPHILS NFR BLD AUTO: 58.7 % — SIGNIFICANT CHANGE UP (ref 43–77)
NEUTROPHILS NFR BLD AUTO: 62.5 % — SIGNIFICANT CHANGE UP (ref 43–77)
NEUTROPHILS NFR BLD AUTO: 62.5 % — SIGNIFICANT CHANGE UP (ref 43–77)
NEUTROPHILS NFR BLD AUTO: 73.8 % — SIGNIFICANT CHANGE UP (ref 43–77)
NEUTROPHILS NFR BLD AUTO: 76.5 % — SIGNIFICANT CHANGE UP (ref 43–77)
NITRITE UR-MCNC: NEGATIVE — SIGNIFICANT CHANGE UP
NONHDLC SERPL-MCNC: 68 MG/DL — SIGNIFICANT CHANGE UP
NRBC # BLD: 0 /100 WBCS — SIGNIFICANT CHANGE UP (ref 0–0)
NRBC # BLD: 3 /100 WBCS — HIGH (ref 0–0)
NRBC # BLD: 3 /100 WBCS — HIGH (ref 0–0)
NRBC # FLD: 0 K/UL — SIGNIFICANT CHANGE UP (ref 0–0)
NRBC # FLD: 0.02 K/UL — HIGH (ref 0–0)
NRBC # FLD: 0.02 K/UL — HIGH (ref 0–0)
NRBC BLD-RTO: 0 /100 WBCS — SIGNIFICANT CHANGE UP (ref 0–0)
ORGANISM # SPEC MICROSCOPIC CNT: ABNORMAL
OSMOLALITY UR: 272 MOSM/KG — SIGNIFICANT CHANGE UP (ref 50–1200)
OVALOCYTES BLD QL SMEAR: SLIGHT — SIGNIFICANT CHANGE UP
PH UR: 5.5 — SIGNIFICANT CHANGE UP (ref 5–8)
PH UR: 5.5 — SIGNIFICANT CHANGE UP (ref 5–8)
PH UR: 6.5 — SIGNIFICANT CHANGE UP (ref 5–8)
PHOSPHATE SERPL-MCNC: 0.9 MG/DL — CRITICAL LOW (ref 2.5–4.5)
PHOSPHATE SERPL-MCNC: 1.1 MG/DL — LOW (ref 2.5–4.5)
PHOSPHATE SERPL-MCNC: 1.5 MG/DL — LOW (ref 2.5–4.5)
PHOSPHATE SERPL-MCNC: 1.5 MG/DL — LOW (ref 2.5–4.5)
PHOSPHATE SERPL-MCNC: 1.6 MG/DL — LOW (ref 2.5–4.5)
PHOSPHATE SERPL-MCNC: 1.8 MG/DL — LOW (ref 2.5–4.5)
PHOSPHATE SERPL-MCNC: 1.8 MG/DL — LOW (ref 2.5–4.5)
PHOSPHATE SERPL-MCNC: 1.9 MG/DL — LOW (ref 2.5–4.5)
PHOSPHATE SERPL-MCNC: 2 MG/DL — LOW (ref 2.5–4.5)
PHOSPHATE SERPL-MCNC: 2.2 MG/DL — LOW (ref 2.5–4.5)
PHOSPHATE SERPL-MCNC: 2.2 MG/DL — LOW (ref 2.5–4.5)
PHOSPHATE SERPL-MCNC: 2.3 MG/DL — LOW (ref 2.5–4.5)
PHOSPHATE SERPL-MCNC: 2.4 MG/DL — LOW (ref 2.5–4.5)
PHOSPHATE SERPL-MCNC: 2.5 MG/DL — SIGNIFICANT CHANGE UP (ref 2.5–4.5)
PHOSPHATE SERPL-MCNC: 3 MG/DL — SIGNIFICANT CHANGE UP (ref 2.5–4.5)
PHOSPHATE SERPL-MCNC: 3 MG/DL — SIGNIFICANT CHANGE UP (ref 2.5–4.5)
PHOSPHATE SERPL-MCNC: 3.2 MG/DL — SIGNIFICANT CHANGE UP (ref 2.5–4.5)
PHOSPHATE SERPL-MCNC: 3.5 MG/DL — SIGNIFICANT CHANGE UP (ref 2.5–4.5)
PHOSPHATE SERPL-MCNC: 3.7 MG/DL — SIGNIFICANT CHANGE UP (ref 2.5–4.5)
PHOSPHATE SERPL-MCNC: 3.8 MG/DL — SIGNIFICANT CHANGE UP (ref 2.5–4.5)
PHOSPHATE SERPL-MCNC: 4 MG/DL — SIGNIFICANT CHANGE UP (ref 2.5–4.5)
PHOSPHATE SERPL-MCNC: 4.1 MG/DL — SIGNIFICANT CHANGE UP (ref 2.5–4.5)
PHOSPHATE SERPL-MCNC: 4.2 MG/DL — SIGNIFICANT CHANGE UP (ref 2.5–4.5)
PHOSPHATE SERPL-MCNC: 4.2 MG/DL — SIGNIFICANT CHANGE UP (ref 2.5–4.5)
PLAT MORPH BLD: NORMAL — SIGNIFICANT CHANGE UP
PLAT MORPH BLD: NORMAL — SIGNIFICANT CHANGE UP
PLATELET # BLD AUTO: 10 K/UL — CRITICAL LOW (ref 150–400)
PLATELET # BLD AUTO: 11 K/UL — CRITICAL LOW (ref 150–400)
PLATELET # BLD AUTO: 12 K/UL — CRITICAL LOW (ref 150–400)
PLATELET # BLD AUTO: 139 K/UL — LOW (ref 150–400)
PLATELET # BLD AUTO: 15 K/UL — CRITICAL LOW (ref 150–400)
PLATELET # BLD AUTO: 15 K/UL — CRITICAL LOW (ref 150–400)
PLATELET # BLD AUTO: 16 K/UL — CRITICAL LOW (ref 150–400)
PLATELET # BLD AUTO: 21 K/UL — LOW (ref 150–400)
PLATELET # BLD AUTO: 31 K/UL — LOW (ref 150–400)
PLATELET # BLD AUTO: 34 K/UL — LOW (ref 150–400)
PLATELET # BLD AUTO: 38 K/UL — LOW (ref 150–400)
PLATELET # BLD AUTO: 38 K/UL — LOW (ref 150–400)
PLATELET # BLD AUTO: 408 K/UL — HIGH (ref 150–400)
PLATELET # BLD AUTO: 45 K/UL — LOW (ref 150–400)
PLATELET # BLD AUTO: 50 K/UL — LOW (ref 150–400)
PLATELET # BLD AUTO: 65 K/UL — LOW (ref 150–400)
PLATELET # BLD AUTO: 69 K/UL — LOW (ref 150–400)
PLATELET # BLD AUTO: 73 K/UL — LOW (ref 150–400)
PLATELET # BLD AUTO: 8 K/UL — CRITICAL LOW (ref 150–400)
PLATELET COUNT - ESTIMATE: ABNORMAL
PLATELET COUNT - ESTIMATE: ABNORMAL
POIKILOCYTOSIS BLD QL AUTO: SLIGHT — SIGNIFICANT CHANGE UP
POIKILOCYTOSIS BLD QL AUTO: SLIGHT — SIGNIFICANT CHANGE UP
POTASSIUM SERPL-MCNC: 3.1 MMOL/L — LOW (ref 3.5–5.3)
POTASSIUM SERPL-MCNC: 3.3 MMOL/L — LOW (ref 3.5–5.3)
POTASSIUM SERPL-MCNC: 3.7 MMOL/L — SIGNIFICANT CHANGE UP (ref 3.5–5.3)
POTASSIUM SERPL-MCNC: 3.7 MMOL/L — SIGNIFICANT CHANGE UP (ref 3.5–5.3)
POTASSIUM SERPL-MCNC: 3.8 MMOL/L — SIGNIFICANT CHANGE UP (ref 3.5–5.3)
POTASSIUM SERPL-MCNC: 4.1 MMOL/L — SIGNIFICANT CHANGE UP (ref 3.5–5.3)
POTASSIUM SERPL-MCNC: 4.2 MMOL/L — SIGNIFICANT CHANGE UP (ref 3.5–5.3)
POTASSIUM SERPL-MCNC: 4.3 MMOL/L — SIGNIFICANT CHANGE UP (ref 3.5–5.3)
POTASSIUM SERPL-MCNC: 4.6 MMOL/L — SIGNIFICANT CHANGE UP (ref 3.5–5.3)
POTASSIUM SERPL-MCNC: 5.1 MMOL/L — SIGNIFICANT CHANGE UP (ref 3.5–5.3)
POTASSIUM SERPL-MCNC: 5.3 MMOL/L — SIGNIFICANT CHANGE UP (ref 3.5–5.3)
POTASSIUM SERPL-MCNC: 5.5 MMOL/L — HIGH (ref 3.5–5.3)
POTASSIUM SERPL-MCNC: 5.6 MMOL/L — HIGH (ref 3.5–5.3)
POTASSIUM SERPL-MCNC: 5.7 MMOL/L — HIGH (ref 3.5–5.3)
POTASSIUM SERPL-SCNC: 3.1 MMOL/L — LOW (ref 3.5–5.3)
POTASSIUM SERPL-SCNC: 3.3 MMOL/L — LOW (ref 3.5–5.3)
POTASSIUM SERPL-SCNC: 3.7 MMOL/L — SIGNIFICANT CHANGE UP (ref 3.5–5.3)
POTASSIUM SERPL-SCNC: 3.7 MMOL/L — SIGNIFICANT CHANGE UP (ref 3.5–5.3)
POTASSIUM SERPL-SCNC: 3.8 MMOL/L — SIGNIFICANT CHANGE UP (ref 3.5–5.3)
POTASSIUM SERPL-SCNC: 4.1 MMOL/L — SIGNIFICANT CHANGE UP (ref 3.5–5.3)
POTASSIUM SERPL-SCNC: 4.2 MMOL/L — SIGNIFICANT CHANGE UP (ref 3.5–5.3)
POTASSIUM SERPL-SCNC: 4.3 MMOL/L — SIGNIFICANT CHANGE UP (ref 3.5–5.3)
POTASSIUM SERPL-SCNC: 4.6 MMOL/L — SIGNIFICANT CHANGE UP (ref 3.5–5.3)
POTASSIUM SERPL-SCNC: 5.1 MMOL/L — SIGNIFICANT CHANGE UP (ref 3.5–5.3)
POTASSIUM SERPL-SCNC: 5.3 MMOL/L — SIGNIFICANT CHANGE UP (ref 3.5–5.3)
POTASSIUM SERPL-SCNC: 5.5 MMOL/L — HIGH (ref 3.5–5.3)
POTASSIUM SERPL-SCNC: 5.6 MMOL/L — HIGH (ref 3.5–5.3)
POTASSIUM SERPL-SCNC: 5.7 MMOL/L — HIGH (ref 3.5–5.3)
POTASSIUM UR-SCNC: 25.1 MMOL/L — SIGNIFICANT CHANGE UP
PROCALCITONIN SERPL-MCNC: 2.51 NG/ML — HIGH (ref 0.02–0.1)
PROT ?TM UR-MCNC: 6 MG/DL — SIGNIFICANT CHANGE UP
PROT SERPL-MCNC: 4.6 G/DL — LOW (ref 6–8.3)
PROT SERPL-MCNC: 4.7 G/DL — LOW (ref 6–8.3)
PROT SERPL-MCNC: 4.8 G/DL — LOW (ref 6–8.3)
PROT SERPL-MCNC: 4.8 G/DL — LOW (ref 6–8.3)
PROT SERPL-MCNC: 4.9 G/DL — LOW (ref 6–8.3)
PROT SERPL-MCNC: 4.9 G/DL — LOW (ref 6–8.3)
PROT SERPL-MCNC: 5 G/DL — LOW (ref 6–8.3)
PROT SERPL-MCNC: 5.1 G/DL — LOW (ref 6–8.3)
PROT SERPL-MCNC: 5.1 G/DL — LOW (ref 6–8.3)
PROT SERPL-MCNC: 5.2 G/DL — LOW (ref 6–8.3)
PROT SERPL-MCNC: 5.3 G/DL — LOW (ref 6–8.3)
PROT SERPL-MCNC: 5.4 G/DL — LOW (ref 6–8.3)
PROT SERPL-MCNC: 5.4 G/DL — LOW (ref 6–8.3)
PROT SERPL-MCNC: 5.6 G/DL — LOW (ref 6–8.3)
PROT SERPL-MCNC: 5.7 G/DL — LOW (ref 6–8.3)
PROT SERPL-MCNC: 5.9 G/DL — LOW (ref 6–8.3)
PROT SERPL-MCNC: 5.9 G/DL — LOW (ref 6–8.3)
PROT SERPL-MCNC: 6.1 G/DL — SIGNIFICANT CHANGE UP (ref 6–8.3)
PROT SERPL-MCNC: 7.1 G/DL — SIGNIFICANT CHANGE UP (ref 6–8.3)
PROT SERPL-MCNC: 7.3 G/DL — SIGNIFICANT CHANGE UP (ref 6–8.3)
PROT UR-MCNC: 30 MG/DL
PROT UR-MCNC: NEGATIVE MG/DL — SIGNIFICANT CHANGE UP
PROT UR-MCNC: NEGATIVE MG/DL — SIGNIFICANT CHANGE UP
PROT/CREAT UR-RTO: 1.2 RATIO — HIGH (ref 0–0.2)
PROTHROM AB SERPL-ACNC: 12.6 SEC — SIGNIFICANT CHANGE UP (ref 9.9–13.4)
PROTHROM AB SERPL-ACNC: 12.9 SEC — SIGNIFICANT CHANGE UP (ref 9.9–13.4)
PROTHROM AB SERPL-ACNC: 13 SEC — SIGNIFICANT CHANGE UP (ref 9.9–13.4)
PROTHROM AB SERPL-ACNC: 13.2 SEC — SIGNIFICANT CHANGE UP (ref 9.9–13.4)
PROTHROM AB SERPL-ACNC: 13.3 SEC — SIGNIFICANT CHANGE UP (ref 9.9–13.4)
PROTHROM AB SERPL-ACNC: 13.8 SEC — HIGH (ref 9.9–13.4)
PROTHROM AB SERPL-ACNC: 14.2 SEC — HIGH (ref 9.9–13.4)
PROTHROM AB SERPL-ACNC: 14.3 SEC — HIGH (ref 9.9–13.4)
PROTHROM AB SERPL-ACNC: 14.4 SEC — HIGH (ref 9.9–13.4)
PROTHROM AB SERPL-ACNC: 14.5 SEC — HIGH (ref 9.9–13.4)
PROTHROM AB SERPL-ACNC: 14.7 SEC — HIGH (ref 9.9–13.4)
RBC # BLD: 2.15 M/UL — LOW (ref 3.8–5.2)
RBC # BLD: 2.45 M/UL — LOW (ref 3.8–5.2)
RBC # BLD: 2.58 M/UL — LOW (ref 3.8–5.2)
RBC # BLD: 2.59 M/UL — LOW (ref 3.8–5.2)
RBC # BLD: 2.65 M/UL — LOW (ref 3.8–5.2)
RBC # BLD: 2.65 M/UL — LOW (ref 3.8–5.2)
RBC # BLD: 2.69 M/UL — LOW (ref 3.8–5.2)
RBC # BLD: 2.7 M/UL — LOW (ref 3.8–5.2)
RBC # BLD: 2.72 M/UL — LOW (ref 3.8–5.2)
RBC # BLD: 2.72 M/UL — LOW (ref 3.8–5.2)
RBC # BLD: 2.76 M/UL — LOW (ref 3.8–5.2)
RBC # BLD: 2.83 M/UL — LOW (ref 3.8–5.2)
RBC # BLD: 2.84 M/UL — LOW (ref 3.8–5.2)
RBC # BLD: 2.86 M/UL — LOW (ref 3.8–5.2)
RBC # BLD: 2.87 M/UL — LOW (ref 3.8–5.2)
RBC # BLD: 2.94 M/UL — LOW (ref 3.8–5.2)
RBC # BLD: 3.08 M/UL — LOW (ref 3.8–5.2)
RBC # BLD: 4.07 M/UL — SIGNIFICANT CHANGE UP (ref 3.8–5.2)
RBC # BLD: 4.53 M/UL — SIGNIFICANT CHANGE UP (ref 3.8–5.2)
RBC # FLD: 13.4 % — SIGNIFICANT CHANGE UP (ref 10.3–14.5)
RBC # FLD: 13.6 % — SIGNIFICANT CHANGE UP (ref 10.3–14.5)
RBC # FLD: 14.9 % — HIGH (ref 10.3–14.5)
RBC # FLD: 15.1 % — HIGH (ref 10.3–14.5)
RBC # FLD: 16.4 % — HIGH (ref 10.3–14.5)
RBC # FLD: 16.6 % — HIGH (ref 10.3–14.5)
RBC # FLD: 16.7 % — HIGH (ref 10.3–14.5)
RBC # FLD: 16.7 % — HIGH (ref 10.3–14.5)
RBC # FLD: 17.2 % — HIGH (ref 10.3–14.5)
RBC # FLD: 17.2 % — HIGH (ref 10.3–14.5)
RBC # FLD: 17.3 % — HIGH (ref 10.3–14.5)
RBC # FLD: 17.3 % — HIGH (ref 10.3–14.5)
RBC # FLD: 17.6 % — HIGH (ref 10.3–14.5)
RBC # FLD: 17.8 % — HIGH (ref 10.3–14.5)
RBC # FLD: 18.1 % — HIGH (ref 10.3–14.5)
RBC # FLD: 18.2 % — HIGH (ref 10.3–14.5)
RBC # FLD: 18.2 % — HIGH (ref 10.3–14.5)
RBC # FLD: 18.3 % — HIGH (ref 10.3–14.5)
RBC # FLD: 18.4 % — HIGH (ref 10.3–14.5)
RBC BLD AUTO: ABNORMAL
RBC BLD AUTO: ABNORMAL
RBC CASTS # UR COMP ASSIST: 2 /HPF — SIGNIFICANT CHANGE UP (ref 0–4)
REVIEW: SIGNIFICANT CHANGE UP
RH IG SCN BLD-IMP: POSITIVE — SIGNIFICANT CHANGE UP
RSV RNA NPH QL NAA+NON-PROBE: SIGNIFICANT CHANGE UP
S AUREUS DNA NOSE QL NAA+PROBE: SIGNIFICANT CHANGE UP
SARS-COV-2 RNA SPEC QL NAA+PROBE: SIGNIFICANT CHANGE UP
SODIUM SERPL-SCNC: 127 MMOL/L — LOW (ref 135–145)
SODIUM SERPL-SCNC: 135 MMOL/L — SIGNIFICANT CHANGE UP (ref 135–145)
SODIUM SERPL-SCNC: 135 MMOL/L — SIGNIFICANT CHANGE UP (ref 135–145)
SODIUM SERPL-SCNC: 139 MMOL/L — SIGNIFICANT CHANGE UP (ref 135–145)
SODIUM SERPL-SCNC: 140 MMOL/L — SIGNIFICANT CHANGE UP (ref 135–145)
SODIUM SERPL-SCNC: 142 MMOL/L — SIGNIFICANT CHANGE UP (ref 135–145)
SODIUM SERPL-SCNC: 142 MMOL/L — SIGNIFICANT CHANGE UP (ref 135–145)
SODIUM SERPL-SCNC: 143 MMOL/L — SIGNIFICANT CHANGE UP (ref 135–145)
SODIUM SERPL-SCNC: 144 MMOL/L — SIGNIFICANT CHANGE UP (ref 135–145)
SODIUM SERPL-SCNC: 145 MMOL/L — SIGNIFICANT CHANGE UP (ref 135–145)
SODIUM SERPL-SCNC: 146 MMOL/L — HIGH (ref 135–145)
SODIUM SERPL-SCNC: 146 MMOL/L — HIGH (ref 135–145)
SODIUM SERPL-SCNC: 148 MMOL/L — HIGH (ref 135–145)
SODIUM SERPL-SCNC: 148 MMOL/L — HIGH (ref 135–145)
SODIUM SERPL-SCNC: 149 MMOL/L — HIGH (ref 135–145)
SODIUM SERPL-SCNC: 150 MMOL/L — HIGH (ref 135–145)
SODIUM SERPL-SCNC: 151 MMOL/L — HIGH (ref 135–145)
SODIUM SERPL-SCNC: 152 MMOL/L — HIGH (ref 135–145)
SODIUM UR-SCNC: 81 MMOL/L — SIGNIFICANT CHANGE UP
SP GR SPEC: 1.02 — SIGNIFICANT CHANGE UP (ref 1–1.03)
SPECIMEN SOURCE: SIGNIFICANT CHANGE UP
SQUAMOUS # UR AUTO: 2 /HPF — SIGNIFICANT CHANGE UP (ref 0–5)
TRIGL SERPL-MCNC: 121 MG/DL — SIGNIFICANT CHANGE UP
TSH SERPL-MCNC: 1.74 UIU/ML — SIGNIFICANT CHANGE UP (ref 0.27–4.2)
URATE SERPL-MCNC: 1 MG/DL — LOW (ref 2.5–7)
URATE SERPL-MCNC: 1.2 MG/DL — LOW (ref 2.5–7)
URATE SERPL-MCNC: 2.1 MG/DL — LOW (ref 2.5–7)
URATE SERPL-MCNC: 3.7 MG/DL — SIGNIFICANT CHANGE UP (ref 2.5–7)
URATE SERPL-MCNC: 3.9 MG/DL — SIGNIFICANT CHANGE UP (ref 2.5–7)
URATE SERPL-MCNC: 4.2 MG/DL — SIGNIFICANT CHANGE UP (ref 2.5–7)
UROBILINOGEN FLD QL: 0.2 MG/DL — SIGNIFICANT CHANGE UP (ref 0.2–1)
UUN UR-MCNC: 28 MG/DL — SIGNIFICANT CHANGE UP
VANCOMYCIN FLD-MCNC: 4.8 UG/ML — SIGNIFICANT CHANGE UP
VARIANT LYMPHS # BLD: 9.1 % — HIGH (ref 0–6)
WBC # BLD: 0.06 K/UL — CRITICAL LOW (ref 3.8–10.5)
WBC # BLD: 0.06 K/UL — CRITICAL LOW (ref 3.8–10.5)
WBC # BLD: 0.08 K/UL — CRITICAL LOW (ref 3.8–10.5)
WBC # BLD: 0.12 K/UL — CRITICAL LOW (ref 3.8–10.5)
WBC # BLD: 0.15 K/UL — CRITICAL LOW (ref 3.8–10.5)
WBC # BLD: 0.16 K/UL — CRITICAL LOW (ref 3.8–10.5)
WBC # BLD: 0.2 K/UL — CRITICAL LOW (ref 3.8–10.5)
WBC # BLD: 0.22 K/UL — CRITICAL LOW (ref 3.8–10.5)
WBC # BLD: 0.23 K/UL — CRITICAL LOW (ref 3.8–10.5)
WBC # BLD: 0.29 K/UL — CRITICAL LOW (ref 3.8–10.5)
WBC # BLD: 0.32 K/UL — CRITICAL LOW (ref 3.8–10.5)
WBC # BLD: 0.38 K/UL — CRITICAL LOW (ref 3.8–10.5)
WBC # BLD: 0.39 K/UL — CRITICAL LOW (ref 3.8–10.5)
WBC # BLD: 0.56 K/UL — CRITICAL LOW (ref 3.8–10.5)
WBC # BLD: 0.61 K/UL — CRITICAL LOW (ref 3.8–10.5)
WBC # BLD: 0.64 K/UL — CRITICAL LOW (ref 3.8–10.5)
WBC # BLD: 0.71 K/UL — CRITICAL LOW (ref 3.8–10.5)
WBC # BLD: 23.08 K/UL — HIGH (ref 3.8–10.5)
WBC # BLD: 4.67 K/UL — SIGNIFICANT CHANGE UP (ref 3.8–10.5)
WBC # FLD AUTO: 0.06 K/UL — CRITICAL LOW (ref 3.8–10.5)
WBC # FLD AUTO: 0.06 K/UL — CRITICAL LOW (ref 3.8–10.5)
WBC # FLD AUTO: 0.08 K/UL — CRITICAL LOW (ref 3.8–10.5)
WBC # FLD AUTO: 0.12 K/UL — CRITICAL LOW (ref 3.8–10.5)
WBC # FLD AUTO: 0.15 K/UL — CRITICAL LOW (ref 3.8–10.5)
WBC # FLD AUTO: 0.16 K/UL — CRITICAL LOW (ref 3.8–10.5)
WBC # FLD AUTO: 0.2 K/UL — CRITICAL LOW (ref 3.8–10.5)
WBC # FLD AUTO: 0.22 K/UL — CRITICAL LOW (ref 3.8–10.5)
WBC # FLD AUTO: 0.23 K/UL — CRITICAL LOW (ref 3.8–10.5)
WBC # FLD AUTO: 0.29 K/UL — CRITICAL LOW (ref 3.8–10.5)
WBC # FLD AUTO: 0.32 K/UL — CRITICAL LOW (ref 3.8–10.5)
WBC # FLD AUTO: 0.38 K/UL — CRITICAL LOW (ref 3.8–10.5)
WBC # FLD AUTO: 0.39 K/UL — CRITICAL LOW (ref 3.8–10.5)
WBC # FLD AUTO: 0.56 K/UL — CRITICAL LOW (ref 3.8–10.5)
WBC # FLD AUTO: 0.61 K/UL — CRITICAL LOW (ref 3.8–10.5)
WBC # FLD AUTO: 0.64 K/UL — CRITICAL LOW (ref 3.8–10.5)
WBC # FLD AUTO: 0.71 K/UL — CRITICAL LOW (ref 3.8–10.5)
WBC # FLD AUTO: 23.08 K/UL — HIGH (ref 3.8–10.5)
WBC # FLD AUTO: 4.67 K/UL — SIGNIFICANT CHANGE UP (ref 3.8–10.5)
WBC UR QL: 0 /HPF — SIGNIFICANT CHANGE UP (ref 0–5)

## 2024-01-01 PROCEDURE — 70491 CT SOFT TISSUE NECK W/DYE: CPT | Mod: 26

## 2024-01-01 PROCEDURE — 76604 US EXAM CHEST: CPT | Mod: 26,GC

## 2024-01-01 PROCEDURE — 99233 SBSQ HOSP IP/OBS HIGH 50: CPT

## 2024-01-01 PROCEDURE — 99223 1ST HOSP IP/OBS HIGH 75: CPT

## 2024-01-01 PROCEDURE — 99291 CRITICAL CARE FIRST HOUR: CPT

## 2024-01-01 PROCEDURE — 93010 ELECTROCARDIOGRAM REPORT: CPT

## 2024-01-01 PROCEDURE — 93308 TTE F-UP OR LMTD: CPT | Mod: 26,GC

## 2024-01-01 PROCEDURE — 95720 EEG PHY/QHP EA INCR W/VEEG: CPT

## 2024-01-01 PROCEDURE — 36569 INSJ PICC 5 YR+ W/O IMAGING: CPT

## 2024-01-01 PROCEDURE — 99291 CRITICAL CARE FIRST HOUR: CPT | Mod: GC

## 2024-01-01 PROCEDURE — 71045 X-RAY EXAM CHEST 1 VIEW: CPT | Mod: 26,76

## 2024-01-01 PROCEDURE — 70450 CT HEAD/BRAIN W/O DYE: CPT | Mod: 26

## 2024-01-01 PROCEDURE — 99285 EMERGENCY DEPT VISIT HI MDM: CPT

## 2024-01-01 PROCEDURE — 74177 CT ABD & PELVIS W/CONTRAST: CPT | Mod: 26

## 2024-01-01 PROCEDURE — 71045 X-RAY EXAM CHEST 1 VIEW: CPT | Mod: 26,59

## 2024-01-01 PROCEDURE — 71046 X-RAY EXAM CHEST 2 VIEWS: CPT | Mod: 26

## 2024-01-01 PROCEDURE — 76937 US GUIDE VASCULAR ACCESS: CPT | Mod: 26,59

## 2024-01-01 PROCEDURE — 99232 SBSQ HOSP IP/OBS MODERATE 35: CPT

## 2024-01-01 PROCEDURE — 76856 US EXAM PELVIC COMPLETE: CPT | Mod: 26

## 2024-01-01 PROCEDURE — 71045 X-RAY EXAM CHEST 1 VIEW: CPT | Mod: 26

## 2024-01-01 PROCEDURE — 71275 CT ANGIOGRAPHY CHEST: CPT | Mod: 26,MC

## 2024-01-01 PROCEDURE — 99292 CRITICAL CARE ADDL 30 MIN: CPT | Mod: GC

## 2024-01-01 RX ORDER — CEFEPIME 2 G/1
INJECTION, POWDER, FOR SOLUTION INTRAVENOUS
Refills: 0 | Status: DISCONTINUED | OUTPATIENT
Start: 2024-01-01 | End: 2024-01-01

## 2024-01-01 RX ORDER — POTASSIUM CHLORIDE 600 MG/1
40 TABLET, EXTENDED RELEASE ORAL ONCE
Refills: 0 | Status: COMPLETED | OUTPATIENT
Start: 2024-01-01 | End: 2024-01-01

## 2024-01-01 RX ORDER — SODIUM BICARBONATE 84 MG/ML
0.23 INJECTION, SOLUTION INTRAVENOUS
Qty: 150 | Refills: 0 | Status: DISCONTINUED | OUTPATIENT
Start: 2024-01-01 | End: 2024-01-01

## 2024-01-01 RX ORDER — SODIUM,POTASSIUM PHOSPHATES 278-250MG
2 POWDER IN PACKET (EA) ORAL
Refills: 0 | Status: DISCONTINUED | OUTPATIENT
Start: 2024-01-01 | End: 2024-01-01

## 2024-01-01 RX ORDER — NOREPINEPHRINE BITARTRATE 1 MG/ML
0.05 INJECTION, SOLUTION, CONCENTRATE INTRAVENOUS
Qty: 16 | Refills: 0 | Status: DISCONTINUED | OUTPATIENT
Start: 2024-01-01 | End: 2024-01-01

## 2024-01-01 RX ORDER — HEPARIN SODIUM 5000 [USP'U]/.5ML
30 INJECTION, SOLUTION INTRAVENOUS; SUBCUTANEOUS ONCE
Refills: 0 | Status: COMPLETED | OUTPATIENT
Start: 2024-01-01 | End: 2024-01-01

## 2024-01-01 RX ORDER — PROPOFOL 10 MG/ML
10 INJECTION, EMULSION INTRAVENOUS ONCE
Refills: 0 | Status: DISCONTINUED | OUTPATIENT
Start: 2024-01-01 | End: 2024-01-01

## 2024-01-01 RX ORDER — CEFEPIME 2 G/1
2000 INJECTION, POWDER, FOR SOLUTION INTRAVENOUS ONCE
Refills: 0 | Status: COMPLETED | OUTPATIENT
Start: 2024-01-01 | End: 2024-01-01

## 2024-01-01 RX ORDER — LIDOCAINE 40 MG/G
1 CREAM TOPICAL ONCE
Refills: 0 | Status: COMPLETED | OUTPATIENT
Start: 2024-01-01 | End: 2024-01-01

## 2024-01-01 RX ORDER — POTASSIUM PHOSPHATE, MONOBASIC POTASSIUM PHOSPHATE, DIBASIC INJECTION, 236; 224 MG/ML; MG/ML
30 SOLUTION, CONCENTRATE INTRAVENOUS ONCE
Refills: 0 | Status: COMPLETED | OUTPATIENT
Start: 2024-01-01 | End: 2024-01-01

## 2024-01-01 RX ORDER — ACETAMINOPHEN 500MG 500 MG/1
1000 TABLET, COATED ORAL ONCE
Refills: 0 | Status: COMPLETED | OUTPATIENT
Start: 2024-01-01 | End: 2024-01-01

## 2024-01-01 RX ORDER — KETAMINE HCL 50MG/ML(1)
200 SYRINGE (ML) INTRAVENOUS ONCE
Refills: 0 | Status: DISCONTINUED | OUTPATIENT
Start: 2024-01-01 | End: 2024-01-01

## 2024-01-01 RX ORDER — 0.9 % SODIUM CHLORIDE 0.9 %
1000 INTRAVENOUS SOLUTION INTRAVENOUS ONCE
Refills: 0 | Status: DISCONTINUED | OUTPATIENT
Start: 2024-01-01 | End: 2024-01-01

## 2024-01-01 RX ORDER — ONDANSETRON HYDROCHLORIDE 4 MG/1
4 TABLET, FILM COATED ORAL EVERY 6 HOURS
Refills: 0 | Status: DISCONTINUED | OUTPATIENT
Start: 2024-01-01 | End: 2024-01-01

## 2024-01-01 RX ORDER — SODIUM BICARBONATE 84 MG/ML
50 INJECTION, SOLUTION INTRAVENOUS ONCE
Refills: 0 | Status: COMPLETED | OUTPATIENT
Start: 2024-01-01 | End: 2024-01-01

## 2024-01-01 RX ORDER — 0.9 % SODIUM CHLORIDE 0.9 %
1000 INTRAVENOUS SOLUTION INTRAVENOUS ONCE
Refills: 0 | Status: COMPLETED | OUTPATIENT
Start: 2024-01-01 | End: 2024-01-01

## 2024-01-01 RX ORDER — 0.9 % SODIUM CHLORIDE 0.9 %
1000 INTRAVENOUS SOLUTION INTRAVENOUS
Refills: 0 | Status: DISCONTINUED | OUTPATIENT
Start: 2024-01-01 | End: 2024-01-01

## 2024-01-01 RX ORDER — ACETAMINOPHEN 500MG 500 MG/1
650 TABLET, COATED ORAL EVERY 6 HOURS
Refills: 0 | Status: DISCONTINUED | OUTPATIENT
Start: 2024-01-01 | End: 2024-01-01

## 2024-01-01 RX ORDER — PROPOFOL 10 MG/ML
50 INJECTION, EMULSION INTRAVENOUS ONCE
Refills: 0 | Status: COMPLETED | OUTPATIENT
Start: 2024-01-01 | End: 2024-01-01

## 2024-01-01 RX ORDER — POTASSIUM CHLORIDE 600 MG/1
10 TABLET, EXTENDED RELEASE ORAL
Refills: 0 | Status: DISCONTINUED | OUTPATIENT
Start: 2024-01-01 | End: 2024-01-01

## 2024-01-01 RX ORDER — SODIUM CHLORIDE 9 MG/ML
500 INJECTION, SOLUTION INTRAMUSCULAR; INTRAVENOUS; SUBCUTANEOUS ONCE
Refills: 0 | Status: COMPLETED | OUTPATIENT
Start: 2024-01-01 | End: 2024-01-01

## 2024-01-01 RX ORDER — CEFTRIAXONE SODIUM 1 G
2000 VIAL (EA) INJECTION EVERY 12 HOURS
Refills: 0 | Status: DISCONTINUED | OUTPATIENT
Start: 2024-01-01 | End: 2024-01-01

## 2024-01-01 RX ORDER — SODIUM,POTASSIUM PHOSPHATES 278-250MG
1 POWDER IN PACKET (EA) ORAL ONCE
Refills: 0 | Status: COMPLETED | OUTPATIENT
Start: 2024-01-01 | End: 2024-01-01

## 2024-01-01 RX ORDER — MEROPENEM 500 MG/1
2000 INJECTION, POWDER, FOR SOLUTION INTRAVENOUS EVERY 8 HOURS
Refills: 0 | Status: DISCONTINUED | OUTPATIENT
Start: 2024-01-01 | End: 2024-01-01

## 2024-01-01 RX ORDER — VANCOMYCIN HCL 900 MCG/MG
1000 POWDER (GRAM) MISCELLANEOUS ONCE
Refills: 0 | Status: COMPLETED | OUTPATIENT
Start: 2024-01-01 | End: 2024-01-01

## 2024-01-01 RX ORDER — LORAZEPAM 2 MG/1
1 TABLET ORAL ONCE
Refills: 0 | Status: DISCONTINUED | OUTPATIENT
Start: 2024-01-01 | End: 2024-01-01

## 2024-01-01 RX ORDER — POTASSIUM PHOSPHATE, MONOBASIC POTASSIUM PHOSPHATE, DIBASIC INJECTION, 236; 224 MG/ML; MG/ML
15 SOLUTION, CONCENTRATE INTRAVENOUS ONCE
Refills: 0 | Status: DISCONTINUED | OUTPATIENT
Start: 2024-01-01 | End: 2024-01-01

## 2024-01-01 RX ORDER — BLOOD-GLUCOSE METER
W/DEVICE KIT MISCELLANEOUS
Qty: 1 | Refills: 0 | Status: ACTIVE | COMMUNITY
Start: 2024-01-01

## 2024-01-01 RX ORDER — INSULIN REG, HUM S-S BUFF 100/ML
5 VIAL (ML) INJECTION ONCE
Refills: 0 | Status: COMPLETED | OUTPATIENT
Start: 2024-01-01 | End: 2024-01-01

## 2024-01-01 RX ORDER — PROPOFOL 10 MG/ML
20 INJECTION, EMULSION INTRAVENOUS
Qty: 1000 | Refills: 0 | Status: DISCONTINUED | OUTPATIENT
Start: 2024-01-01 | End: 2024-01-01

## 2024-01-01 RX ORDER — PIPERACILLIN SODIUM AND TAZOBACTAM SODIUM 4; .5 G/20ML; G/20ML
3.38 INJECTION, POWDER, LYOPHILIZED, FOR SOLUTION INTRAVENOUS ONCE
Refills: 0 | Status: COMPLETED | OUTPATIENT
Start: 2024-01-01 | End: 2024-01-01

## 2024-01-01 RX ORDER — FENTANYL 12 UG/H
2 PATCH, EXTENDED RELEASE TRANSDERMAL
Qty: 2500 | Refills: 0 | Status: DISCONTINUED | OUTPATIENT
Start: 2024-01-01 | End: 2024-01-01

## 2024-01-01 RX ORDER — PROPOFOL 10 MG/ML
20.01 INJECTION, EMULSION INTRAVENOUS
Qty: 1000 | Refills: 0 | Status: DISCONTINUED | OUTPATIENT
Start: 2024-01-01 | End: 2024-01-01

## 2024-01-01 RX ORDER — INSULIN REG, HUM S-S BUFF 100/ML
12 VIAL (ML) INJECTION
Qty: 100 | Refills: 0 | Status: DISCONTINUED | OUTPATIENT
Start: 2024-01-01 | End: 2024-01-01

## 2024-01-01 RX ORDER — ALLOPURINOL 300 MG/1
300 TABLET ORAL
Qty: 7 | Refills: 0 | Status: ACTIVE | COMMUNITY
Start: 2024-01-01 | End: 1900-01-01

## 2024-01-01 RX ORDER — SODIUM,POTASSIUM PHOSPHATES 278-250MG
2 POWDER IN PACKET (EA) ORAL ONCE
Refills: 0 | Status: COMPLETED | OUTPATIENT
Start: 2024-01-01 | End: 2024-01-01

## 2024-01-01 RX ORDER — VANCOMYCIN HCL 900 MCG/MG
POWDER (GRAM) MISCELLANEOUS
Refills: 0 | Status: DISCONTINUED | OUTPATIENT
Start: 2024-01-01 | End: 2024-01-01

## 2024-01-01 RX ORDER — POTASSIUM PHOSPHATE, MONOBASIC POTASSIUM PHOSPHATE, DIBASIC INJECTION, 236; 224 MG/ML; MG/ML
30 SOLUTION, CONCENTRATE INTRAVENOUS ONCE
Refills: 0 | Status: DISCONTINUED | OUTPATIENT
Start: 2024-01-01 | End: 2024-01-01

## 2024-01-01 RX ORDER — POTASSIUM CHLORIDE 600 MG/1
40 TABLET, EXTENDED RELEASE ORAL ONCE
Refills: 0 | Status: DISCONTINUED | OUTPATIENT
Start: 2024-01-01 | End: 2024-01-01

## 2024-01-01 RX ORDER — POTASSIUM CHLORIDE 600 MG/1
10 TABLET, EXTENDED RELEASE ORAL
Refills: 0 | Status: COMPLETED | OUTPATIENT
Start: 2024-01-01 | End: 2024-01-01

## 2024-01-01 RX ORDER — SENNOSIDES 8.6 MG
2 TABLET ORAL AT BEDTIME
Refills: 0 | Status: DISCONTINUED | OUTPATIENT
Start: 2024-01-01 | End: 2024-01-01

## 2024-01-01 RX ORDER — SODIUM CHLORIDE 9 MG/ML
2000 INJECTION, SOLUTION INTRAMUSCULAR; INTRAVENOUS; SUBCUTANEOUS ONCE
Refills: 0 | Status: COMPLETED | OUTPATIENT
Start: 2024-01-01 | End: 2024-01-01

## 2024-01-01 RX ORDER — HUMAN INSULIN 100 [IU]/ML
10 INJECTION, SUSPENSION SUBCUTANEOUS EVERY 6 HOURS
Refills: 0 | Status: DISCONTINUED | OUTPATIENT
Start: 2024-01-01 | End: 2024-01-01

## 2024-01-01 RX ORDER — SODIUM CHLORIDE 9 MG/ML
1000 INJECTION, SOLUTION INTRAMUSCULAR; INTRAVENOUS; SUBCUTANEOUS
Refills: 0 | Status: DISCONTINUED | OUTPATIENT
Start: 2024-01-01 | End: 2024-01-01

## 2024-01-01 RX ORDER — SODIUM,POTASSIUM PHOSPHATES 278-250MG
2 POWDER IN PACKET (EA) ORAL
Refills: 0 | Status: COMPLETED | OUTPATIENT
Start: 2024-01-01 | End: 2024-01-01

## 2024-01-01 RX ORDER — METRONIDAZOLE 500 MG/1
500 TABLET ORAL EVERY 8 HOURS
Refills: 0 | Status: DISCONTINUED | OUTPATIENT
Start: 2024-01-01 | End: 2024-01-01

## 2024-01-01 RX ORDER — CHLORHEXIDINE GLUCONATE 1.2 MG/ML
15 RINSE ORAL EVERY 12 HOURS
Refills: 0 | Status: DISCONTINUED | OUTPATIENT
Start: 2024-01-01 | End: 2024-01-01

## 2024-01-01 RX ORDER — PIPERACILLIN SODIUM AND TAZOBACTAM SODIUM 4; .5 G/20ML; G/20ML
3.38 INJECTION, POWDER, LYOPHILIZED, FOR SOLUTION INTRAVENOUS EVERY 8 HOURS
Refills: 0 | Status: DISCONTINUED | OUTPATIENT
Start: 2024-01-01 | End: 2024-01-01

## 2024-01-01 RX ORDER — CEFEPIME 2 G/1
2000 INJECTION, POWDER, FOR SOLUTION INTRAVENOUS EVERY 24 HOURS
Refills: 0 | Status: DISCONTINUED | OUTPATIENT
Start: 2024-01-01 | End: 2024-01-01

## 2024-01-01 RX ORDER — NOREPINEPHRINE BITARTRATE 1 MG/ML
0.05 INJECTION, SOLUTION, CONCENTRATE INTRAVENOUS
Qty: 8 | Refills: 0 | Status: DISCONTINUED | OUTPATIENT
Start: 2024-01-01 | End: 2024-01-01

## 2024-01-01 RX ORDER — CEFEPIME 2 G/1
2000 INJECTION, POWDER, FOR SOLUTION INTRAVENOUS EVERY 8 HOURS
Refills: 0 | Status: DISCONTINUED | OUTPATIENT
Start: 2024-01-01 | End: 2024-01-01

## 2024-01-01 RX ORDER — FENTANYL 12 UG/H
0.5 PATCH, EXTENDED RELEASE TRANSDERMAL
Qty: 2500 | Refills: 0 | Status: DISCONTINUED | OUTPATIENT
Start: 2024-01-01 | End: 2024-01-01

## 2024-01-01 RX ORDER — FENTANYL 12 UG/H
100 PATCH, EXTENDED RELEASE TRANSDERMAL ONCE
Refills: 0 | Status: DISCONTINUED | OUTPATIENT
Start: 2024-01-01 | End: 2024-01-01

## 2024-01-01 RX ORDER — MEROPENEM 500 MG/1
1000 INJECTION, POWDER, FOR SOLUTION INTRAVENOUS EVERY 8 HOURS
Refills: 0 | Status: DISCONTINUED | OUTPATIENT
Start: 2024-01-01 | End: 2024-01-01

## 2024-01-01 RX ORDER — HEPARIN SODIUM 5000 [USP'U]/.5ML
30 INJECTION, SOLUTION INTRAVENOUS; SUBCUTANEOUS ONCE
Refills: 0 | Status: DISCONTINUED | OUTPATIENT
Start: 2024-01-01 | End: 2024-01-01

## 2024-01-01 RX ORDER — HEPARIN SODIUM,PORCINE 1000/ML
5000 VIAL (ML) INJECTION EVERY 8 HOURS
Refills: 0 | Status: DISCONTINUED | OUTPATIENT
Start: 2024-01-01 | End: 2024-01-01

## 2024-01-01 RX ORDER — FENTANYL 12 UG/H
0.5 PATCH, EXTENDED RELEASE TRANSDERMAL
Qty: 5000 | Refills: 0 | Status: DISCONTINUED | OUTPATIENT
Start: 2024-01-01 | End: 2024-01-01

## 2024-01-01 RX ORDER — CEFTRIAXONE SODIUM 1 G
2000 VIAL (EA) INJECTION ONCE
Refills: 0 | Status: COMPLETED | OUTPATIENT
Start: 2024-01-01 | End: 2024-01-01

## 2024-01-01 RX ORDER — SODIUM BICARBONATE 84 MG/ML
25 INJECTION, SOLUTION INTRAVENOUS ONCE
Refills: 0 | Status: DISCONTINUED | OUTPATIENT
Start: 2024-01-01 | End: 2024-01-01

## 2024-01-01 RX ORDER — CEFTRIAXONE SODIUM 1 G
2000 VIAL (EA) INJECTION EVERY 24 HOURS
Refills: 0 | Status: DISCONTINUED | OUTPATIENT
Start: 2024-01-01 | End: 2024-01-01

## 2024-01-01 RX ORDER — ISOPROPYL ALCOHOL 70 ML/100ML
SWAB TOPICAL
Qty: 1 | Refills: 5 | Status: ACTIVE | COMMUNITY
Start: 2024-01-01

## 2024-01-01 RX ORDER — PANTOPRAZOLE SODIUM 40 MG/1
40 TABLET, DELAYED RELEASE ORAL
Refills: 0 | Status: DISCONTINUED | OUTPATIENT
Start: 2024-01-01 | End: 2024-01-01

## 2024-01-01 RX ORDER — SODIUM BICARBONATE 84 MG/ML
150 INJECTION, SOLUTION INTRAVENOUS ONCE
Refills: 0 | Status: DISCONTINUED | OUTPATIENT
Start: 2024-01-01 | End: 2024-01-01

## 2024-01-01 RX ORDER — POVIDONE, POLYVINYL ALCOHOL 20; 27 G/1000ML; G/1000ML
1 SOLUTION OPHTHALMIC
Refills: 0 | Status: DISCONTINUED | OUTPATIENT
Start: 2024-01-01 | End: 2024-01-01

## 2024-01-01 RX ORDER — VANCOMYCIN HCL 900 MCG/MG
1250 POWDER (GRAM) MISCELLANEOUS EVERY 12 HOURS
Refills: 0 | Status: DISCONTINUED | OUTPATIENT
Start: 2024-01-01 | End: 2024-01-01

## 2024-01-01 RX ORDER — INSULIN REG, HUM S-S BUFF 100/ML
3 VIAL (ML) INJECTION
Qty: 100 | Refills: 0 | Status: DISCONTINUED | OUTPATIENT
Start: 2024-01-01 | End: 2024-01-01

## 2024-01-01 RX ORDER — PHENYLEPHRINE HYDROCHLORIDE 10 MG/ML
0.1 INJECTION INTRAVENOUS
Qty: 160 | Refills: 0 | Status: DISCONTINUED | OUTPATIENT
Start: 2024-01-01 | End: 2024-01-01

## 2024-01-01 RX ORDER — GLUCAGON INJECTION, SOLUTION 0.5 MG/.1ML
1 INJECTION, SOLUTION SUBCUTANEOUS ONCE
Refills: 0 | Status: DISCONTINUED | OUTPATIENT
Start: 2024-01-01 | End: 2024-01-01

## 2024-01-01 RX ORDER — PHENYLEPHRINE HYDROCHLORIDE 10 MG/ML
0.1 INJECTION INTRAVENOUS
Qty: 40 | Refills: 0 | Status: DISCONTINUED | OUTPATIENT
Start: 2024-01-01 | End: 2024-01-01

## 2024-01-01 RX ORDER — CHLORHEXIDINE GLUCONATE 1.2 MG/ML
1 RINSE ORAL
Refills: 0 | Status: DISCONTINUED | OUTPATIENT
Start: 2024-01-01 | End: 2024-01-01

## 2024-01-01 RX ORDER — KETAMINE HCL 50MG/ML(1)
100 SYRINGE (ML) INTRAVENOUS ONCE
Refills: 0 | Status: DISCONTINUED | OUTPATIENT
Start: 2024-01-01 | End: 2024-01-01

## 2024-01-01 RX ORDER — LANCETS 33 GAUGE
EACH MISCELLANEOUS
Qty: 1 | Refills: 5 | Status: ACTIVE | COMMUNITY
Start: 2024-01-01

## 2024-01-01 RX ORDER — GLYCOPYRROLATE 1 MG/1
1 TABLET ORAL ONCE
Refills: 0 | Status: DISCONTINUED | OUTPATIENT
Start: 2024-01-01 | End: 2024-01-01

## 2024-01-01 RX ORDER — PHENYLEPHRINE HYDROCHLORIDE 10 MG/ML
0.5 INJECTION INTRAVENOUS
Qty: 160 | Refills: 0 | Status: DISCONTINUED | OUTPATIENT
Start: 2024-01-01 | End: 2024-01-01

## 2024-01-01 RX ORDER — VASOPRESSIN 20 [USP'U]/ML
0.06 INJECTION, SOLUTION INTRAVENOUS
Qty: 40 | Refills: 0 | Status: DISCONTINUED | OUTPATIENT
Start: 2024-01-01 | End: 2024-01-01

## 2024-01-01 RX ORDER — CEFTRIAXONE SODIUM 1 G
VIAL (EA) INJECTION
Refills: 0 | Status: DISCONTINUED | OUTPATIENT
Start: 2024-01-01 | End: 2024-01-01

## 2024-01-01 RX ORDER — VANCOMYCIN HCL 900 MCG/MG
1500 POWDER (GRAM) MISCELLANEOUS ONCE
Refills: 0 | Status: COMPLETED | OUTPATIENT
Start: 2024-01-01 | End: 2024-01-01

## 2024-01-01 RX ORDER — METOCLOPRAMIDE 10 MG/1
10 TABLET ORAL
Qty: 120 | Refills: 6 | Status: ACTIVE | COMMUNITY
Start: 2024-01-01 | End: 1900-01-01

## 2024-01-01 RX ORDER — PREDNISONE 50 MG/1
50 TABLET ORAL
Qty: 50 | Refills: 0 | Status: ACTIVE | COMMUNITY
Start: 2024-01-01 | End: 1900-01-01

## 2024-01-01 RX ORDER — HEPARIN SODIUM 5000 [USP'U]/.5ML
15 INJECTION, SOLUTION INTRAVENOUS; SUBCUTANEOUS ONCE
Refills: 0 | Status: DISCONTINUED | OUTPATIENT
Start: 2024-01-01 | End: 2024-01-01

## 2024-01-01 RX ORDER — INSULIN GLARGINE 100 [IU]/ML
19 INJECTION, SOLUTION SUBCUTANEOUS ONCE
Refills: 0 | Status: DISCONTINUED | OUTPATIENT
Start: 2024-01-01 | End: 2024-01-01

## 2024-01-01 RX ORDER — BLOOD SUGAR DIAGNOSTIC
STRIP MISCELLANEOUS 4 TIMES DAILY
Qty: 100 | Refills: 5 | Status: ACTIVE | COMMUNITY
Start: 2024-01-01

## 2024-01-01 RX ORDER — INSULIN REG, HUM S-S BUFF 100/ML
1 VIAL (ML) INJECTION
Qty: 100 | Refills: 0 | Status: DISCONTINUED | OUTPATIENT
Start: 2024-01-01 | End: 2024-01-01

## 2024-01-01 RX ADMIN — CHLORHEXIDINE GLUCONATE 1 APPLICATION(S): 1.2 RINSE ORAL at 05:24

## 2024-01-01 RX ADMIN — Medication 1 UNIT(S)/HR: at 20:28

## 2024-01-01 RX ADMIN — POTASSIUM CHLORIDE 40 MILLIEQUIVALENT(S): 600 TABLET, EXTENDED RELEASE ORAL at 22:34

## 2024-01-01 RX ADMIN — POTASSIUM CHLORIDE 100 MILLIEQUIVALENT(S): 600 TABLET, EXTENDED RELEASE ORAL at 18:53

## 2024-01-01 RX ADMIN — FENTANYL 100 MICROGRAM(S): 12 PATCH, EXTENDED RELEASE TRANSDERMAL at 02:15

## 2024-01-01 RX ADMIN — CEFEPIME 2000 MILLIGRAM(S): 2 INJECTION, POWDER, FOR SOLUTION INTRAVENOUS at 16:34

## 2024-01-01 RX ADMIN — CHLORHEXIDINE GLUCONATE 15 MILLILITER(S): 1.2 RINSE ORAL at 06:10

## 2024-01-01 RX ADMIN — POTASSIUM PHOSPHATE, MONOBASIC POTASSIUM PHOSPHATE, DIBASIC INJECTION, 111.11 MILLIMOLE(S): 236; 224 SOLUTION, CONCENTRATE INTRAVENOUS at 09:50

## 2024-01-01 RX ADMIN — HEPARIN SODIUM 85 MILLIMOLE(S): 5000 INJECTION, SOLUTION INTRAVENOUS; SUBCUTANEOUS at 02:00

## 2024-01-01 RX ADMIN — Medication 4 MILLIGRAM(S): at 01:27

## 2024-01-01 RX ADMIN — Medication 125 MILLILITER(S): at 07:49

## 2024-01-01 RX ADMIN — Medication 500 MILLILITER(S): at 13:34

## 2024-01-01 RX ADMIN — PROPOFOL 11.5 MICROGRAM(S)/KG/MIN: 10 INJECTION, EMULSION INTRAVENOUS at 02:07

## 2024-01-01 RX ADMIN — Medication 100 MILLIGRAM(S): at 18:30

## 2024-01-01 RX ADMIN — SODIUM CHLORIDE 2000 MILLILITER(S): 9 INJECTION, SOLUTION INTRAMUSCULAR; INTRAVENOUS; SUBCUTANEOUS at 18:30

## 2024-01-01 RX ADMIN — PROPOFOL 11.5 MICROGRAM(S)/KG/MIN: 10 INJECTION, EMULSION INTRAVENOUS at 08:58

## 2024-01-01 RX ADMIN — Medication 4 MILLIGRAM(S): at 03:30

## 2024-01-01 RX ADMIN — Medication 150 MILLILITER(S): at 12:02

## 2024-01-01 RX ADMIN — Medication 200 MILLIGRAM(S): at 03:15

## 2024-01-01 RX ADMIN — Medication 2 PACKET(S): at 11:44

## 2024-01-01 RX ADMIN — Medication 2 MILLIGRAM(S): at 18:59

## 2024-01-01 RX ADMIN — NOREPINEPHRINE BITARTRATE 4.49 MICROGRAM(S)/KG/MIN: 1 INJECTION, SOLUTION, CONCENTRATE INTRAVENOUS at 08:39

## 2024-01-01 RX ADMIN — SODIUM BICARBONATE 100 MEQ/KG/HR: 84 INJECTION, SOLUTION INTRAVENOUS at 21:22

## 2024-01-01 RX ADMIN — PROPOFOL 11.5 MICROGRAM(S)/KG/MIN: 10 INJECTION, EMULSION INTRAVENOUS at 08:39

## 2024-01-01 RX ADMIN — SODIUM BICARBONATE 50 MILLIEQUIVALENT(S): 84 INJECTION, SOLUTION INTRAVENOUS at 01:03

## 2024-01-01 RX ADMIN — FENTANYL 4.79 MICROGRAM(S)/KG/HR: 12 PATCH, EXTENDED RELEASE TRANSDERMAL at 05:14

## 2024-01-01 RX ADMIN — Medication 150 MILLILITER(S): at 10:05

## 2024-01-01 RX ADMIN — Medication 8 MILLIGRAM(S): at 01:38

## 2024-01-01 RX ADMIN — Medication 14 UNIT(S)/HR: at 08:17

## 2024-01-01 RX ADMIN — Medication 200 GRAM(S): at 06:18

## 2024-01-01 RX ADMIN — Medication 25 GRAM(S): at 09:25

## 2024-01-01 RX ADMIN — Medication 150 MILLILITER(S): at 06:10

## 2024-01-01 RX ADMIN — PIPERACILLIN SODIUM AND TAZOBACTAM SODIUM 25 GRAM(S): 4; .5 INJECTION, POWDER, LYOPHILIZED, FOR SOLUTION INTRAVENOUS at 00:47

## 2024-01-01 RX ADMIN — Medication 200 GRAM(S): at 17:21

## 2024-01-01 RX ADMIN — Medication 25 GRAM(S): at 14:10

## 2024-01-01 RX ADMIN — NOREPINEPHRINE BITARTRATE 4.49 MICROGRAM(S)/KG/MIN: 1 INJECTION, SOLUTION, CONCENTRATE INTRAVENOUS at 19:45

## 2024-01-01 RX ADMIN — PIPERACILLIN SODIUM AND TAZOBACTAM SODIUM 25 GRAM(S): 4; .5 INJECTION, POWDER, LYOPHILIZED, FOR SOLUTION INTRAVENOUS at 17:21

## 2024-01-01 RX ADMIN — PROPOFOL 11.5 MICROGRAM(S)/KG/MIN: 10 INJECTION, EMULSION INTRAVENOUS at 22:14

## 2024-01-01 RX ADMIN — Medication 5 UNIT(S): at 01:30

## 2024-01-01 RX ADMIN — POTASSIUM PHOSPHATE, MONOBASIC POTASSIUM PHOSPHATE, DIBASIC INJECTION, 83.33 MILLIMOLE(S): 236; 224 SOLUTION, CONCENTRATE INTRAVENOUS at 22:28

## 2024-01-01 RX ADMIN — Medication 150 MILLILITER(S): at 21:59

## 2024-01-01 RX ADMIN — POTASSIUM CHLORIDE 100 MILLIEQUIVALENT(S): 600 TABLET, EXTENDED RELEASE ORAL at 11:40

## 2024-01-01 RX ADMIN — CHLORHEXIDINE GLUCONATE 1 APPLICATION(S): 1.2 RINSE ORAL at 06:11

## 2024-01-01 RX ADMIN — FENTANYL 4.79 MICROGRAM(S)/KG/HR: 12 PATCH, EXTENDED RELEASE TRANSDERMAL at 07:04

## 2024-01-01 RX ADMIN — LIDOCAINE 1 PATCH: 40 CREAM TOPICAL at 19:38

## 2024-01-01 RX ADMIN — Medication 200 MILLIGRAM(S): at 01:30

## 2024-01-01 RX ADMIN — FENTANYL 100 MICROGRAM(S): 12 PATCH, EXTENDED RELEASE TRANSDERMAL at 01:35

## 2024-01-01 RX ADMIN — ACETAMINOPHEN 500MG 400 MILLIGRAM(S): 500 TABLET, COATED ORAL at 21:35

## 2024-01-01 RX ADMIN — Medication 2 PACKET(S): at 13:24

## 2024-01-01 RX ADMIN — Medication 25 GRAM(S): at 09:13

## 2024-01-01 RX ADMIN — LIDOCAINE 1 PATCH: 40 CREAM TOPICAL at 13:11

## 2024-01-01 RX ADMIN — Medication 25 GRAM(S): at 13:51

## 2024-01-01 RX ADMIN — PIPERACILLIN SODIUM AND TAZOBACTAM SODIUM 200 GRAM(S): 4; .5 INJECTION, POWDER, LYOPHILIZED, FOR SOLUTION INTRAVENOUS at 21:59

## 2024-01-01 RX ADMIN — SODIUM BICARBONATE 150 MEQ/KG/HR: 84 INJECTION, SOLUTION INTRAVENOUS at 08:40

## 2024-01-01 RX ADMIN — Medication 5000 UNIT(S): at 06:11

## 2024-01-01 RX ADMIN — Medication 3 UNIT(S)/HR: at 07:49

## 2024-01-01 RX ADMIN — FENTANYL 4.79 MICROGRAM(S)/KG/HR: 12 PATCH, EXTENDED RELEASE TRANSDERMAL at 19:45

## 2024-01-01 RX ADMIN — ACETAMINOPHEN 500MG 1000 MILLIGRAM(S): 500 TABLET, COATED ORAL at 13:49

## 2024-01-01 RX ADMIN — Medication 250 MILLIGRAM(S): at 23:13

## 2024-01-01 RX ADMIN — PIPERACILLIN SODIUM AND TAZOBACTAM SODIUM 25 GRAM(S): 4; .5 INJECTION, POWDER, LYOPHILIZED, FOR SOLUTION INTRAVENOUS at 09:11

## 2024-01-01 RX ADMIN — Medication 1000 MILLILITER(S): at 05:23

## 2024-01-01 RX ADMIN — Medication 12.5 MILLILITER(S): at 13:30

## 2024-01-01 RX ADMIN — Medication 10 UNIT(S)/HR: at 19:20

## 2024-01-01 RX ADMIN — ACETAMINOPHEN 500MG 400 MILLIGRAM(S): 500 TABLET, COATED ORAL at 12:53

## 2024-01-01 RX ADMIN — MEROPENEM 100 MILLIGRAM(S): 500 INJECTION, POWDER, FOR SOLUTION INTRAVENOUS at 05:22

## 2024-01-01 RX ADMIN — FENTANYL 4.79 MICROGRAM(S)/KG/HR: 12 PATCH, EXTENDED RELEASE TRANSDERMAL at 08:58

## 2024-01-01 RX ADMIN — FENTANYL 4.79 MICROGRAM(S)/KG/HR: 12 PATCH, EXTENDED RELEASE TRANSDERMAL at 20:35

## 2024-01-01 RX ADMIN — ACETAMINOPHEN 500MG 650 MILLIGRAM(S): 500 TABLET, COATED ORAL at 19:13

## 2024-01-01 RX ADMIN — Medication 14 UNIT(S)/HR: at 22:22

## 2024-01-01 RX ADMIN — Medication 50 MILLILITER(S): at 01:30

## 2024-01-01 RX ADMIN — SODIUM BICARBONATE 150 MEQ/KG/HR: 84 INJECTION, SOLUTION INTRAVENOUS at 05:14

## 2024-01-01 RX ADMIN — Medication 5 UNIT(S): at 16:39

## 2024-01-01 RX ADMIN — HEPARIN SODIUM 85 MILLIMOLE(S): 5000 INJECTION, SOLUTION INTRAVENOUS; SUBCUTANEOUS at 08:48

## 2024-01-01 RX ADMIN — VASOPRESSIN 3 UNIT(S)/MIN: 20 INJECTION, SOLUTION INTRAVENOUS at 07:49

## 2024-01-01 RX ADMIN — Medication 2 MILLIGRAM(S): at 18:08

## 2024-01-01 RX ADMIN — Medication 175 MILLILITER(S): at 19:46

## 2024-01-01 RX ADMIN — PANTOPRAZOLE SODIUM 40 MILLIGRAM(S): 40 TABLET, DELAYED RELEASE ORAL at 22:18

## 2024-01-01 RX ADMIN — CEFEPIME 100 MILLIGRAM(S): 2 INJECTION, POWDER, FOR SOLUTION INTRAVENOUS at 07:04

## 2024-01-01 RX ADMIN — CEFEPIME 100 MILLIGRAM(S): 2 INJECTION, POWDER, FOR SOLUTION INTRAVENOUS at 15:13

## 2024-01-01 RX ADMIN — CEFEPIME 100 MILLIGRAM(S): 2 INJECTION, POWDER, FOR SOLUTION INTRAVENOUS at 13:32

## 2024-01-01 RX ADMIN — Medication 14 UNIT(S)/HR: at 21:33

## 2024-01-01 RX ADMIN — VASOPRESSIN 9 UNIT(S)/MIN: 20 INJECTION, SOLUTION INTRAVENOUS at 20:28

## 2024-01-01 RX ADMIN — SODIUM CHLORIDE 1000 MILLILITER(S): 9 INJECTION, SOLUTION INTRAMUSCULAR; INTRAVENOUS; SUBCUTANEOUS at 05:45

## 2024-01-01 RX ADMIN — Medication 150 MILLILITER(S): at 21:32

## 2024-01-01 RX ADMIN — Medication 150 MILLILITER(S): at 19:35

## 2024-01-01 RX ADMIN — Medication 150 MILLILITER(S): at 20:49

## 2024-01-01 RX ADMIN — PANTOPRAZOLE SODIUM 40 MILLIGRAM(S): 40 TABLET, DELAYED RELEASE ORAL at 05:25

## 2024-01-01 RX ADMIN — ACETAMINOPHEN 500MG 1000 MILLIGRAM(S): 500 TABLET, COATED ORAL at 16:34

## 2024-01-01 RX ADMIN — Medication 100 MILLIGRAM(S): at 14:36

## 2024-01-01 RX ADMIN — METRONIDAZOLE 100 MILLIGRAM(S): 500 TABLET ORAL at 22:15

## 2024-01-01 RX ADMIN — Medication 25 GRAM(S): at 12:00

## 2024-01-01 RX ADMIN — Medication 500 MILLILITER(S): at 08:58

## 2024-01-01 RX ADMIN — Medication 1 PACKET(S): at 05:28

## 2024-01-01 RX ADMIN — POTASSIUM PHOSPHATE, MONOBASIC POTASSIUM PHOSPHATE, DIBASIC INJECTION, 83.33 MILLIMOLE(S): 236; 224 SOLUTION, CONCENTRATE INTRAVENOUS at 18:03

## 2024-01-01 RX ADMIN — POTASSIUM CHLORIDE 100 MILLIEQUIVALENT(S): 600 TABLET, EXTENDED RELEASE ORAL at 22:22

## 2024-01-01 RX ADMIN — Medication 2 PACKET(S): at 14:10

## 2024-01-01 RX ADMIN — SODIUM CHLORIDE 2000 MILLILITER(S): 9 INJECTION, SOLUTION INTRAMUSCULAR; INTRAVENOUS; SUBCUTANEOUS at 15:13

## 2024-01-01 RX ADMIN — FENTANYL 100 MICROGRAM(S): 12 PATCH, EXTENDED RELEASE TRANSDERMAL at 02:00

## 2024-01-01 RX ADMIN — Medication 1 UNIT(S)/HR: at 08:40

## 2024-01-01 RX ADMIN — POVIDONE, POLYVINYL ALCOHOL 1 APPLICATION(S): 20; 27 SOLUTION OPHTHALMIC at 05:25

## 2024-01-01 RX ADMIN — Medication 500 MILLILITER(S): at 09:50

## 2024-01-01 RX ADMIN — ACETAMINOPHEN 500MG 650 MILLIGRAM(S): 500 TABLET, COATED ORAL at 18:13

## 2024-01-01 RX ADMIN — Medication 1000 MILLILITER(S): at 13:12

## 2024-01-01 RX ADMIN — Medication 200 GRAM(S): at 21:34

## 2024-01-01 RX ADMIN — FENTANYL 100 MICROGRAM(S): 12 PATCH, EXTENDED RELEASE TRANSDERMAL at 01:53

## 2024-01-01 RX ADMIN — Medication 1000 MILLILITER(S): at 19:45

## 2024-01-01 RX ADMIN — Medication 150 MILLILITER(S): at 22:22

## 2024-01-01 RX ADMIN — LIDOCAINE 1 PATCH: 40 CREAM TOPICAL at 02:00

## 2024-01-01 RX ADMIN — Medication 150 MILLILITER(S): at 02:48

## 2024-01-01 RX ADMIN — PROPOFOL 50 MILLIGRAM(S): 10 INJECTION, EMULSION INTRAVENOUS at 01:40

## 2024-01-01 RX ADMIN — CHLORHEXIDINE GLUCONATE 15 MILLILITER(S): 1.2 RINSE ORAL at 17:22

## 2024-01-01 RX ADMIN — ACETAMINOPHEN 500MG 1000 MILLIGRAM(S): 500 TABLET, COATED ORAL at 22:50

## 2024-01-01 RX ADMIN — ACETAMINOPHEN 500MG 400 MILLIGRAM(S): 500 TABLET, COATED ORAL at 14:39

## 2024-01-01 RX ADMIN — CEFEPIME 100 MILLIGRAM(S): 2 INJECTION, POWDER, FOR SOLUTION INTRAVENOUS at 05:24

## 2024-01-01 RX ADMIN — Medication 1 UNIT(S)/HR: at 22:15

## 2024-01-01 RX ADMIN — PROPOFOL 11.5 MICROGRAM(S)/KG/MIN: 10 INJECTION, EMULSION INTRAVENOUS at 19:46

## 2024-01-01 RX ADMIN — POTASSIUM CHLORIDE 100 MILLIEQUIVALENT(S): 600 TABLET, EXTENDED RELEASE ORAL at 12:47

## 2024-01-01 RX ADMIN — FENTANYL 4.79 MICROGRAM(S)/KG/HR: 12 PATCH, EXTENDED RELEASE TRANSDERMAL at 22:15

## 2024-01-01 RX ADMIN — PROPOFOL 50 MILLIGRAM(S): 10 INJECTION, EMULSION INTRAVENOUS at 01:44

## 2024-01-01 RX ADMIN — Medication 1 MILLIGRAM(S): at 16:45

## 2024-01-01 RX ADMIN — FENTANYL 4.79 MICROGRAM(S)/KG/HR: 12 PATCH, EXTENDED RELEASE TRANSDERMAL at 08:40

## 2024-01-01 RX ADMIN — Medication 10 UNIT(S)/HR: at 07:18

## 2024-01-01 RX ADMIN — PHENYLEPHRINE HYDROCHLORIDE 8.98 MICROGRAM(S)/KG/MIN: 10 INJECTION INTRAVENOUS at 19:45

## 2024-01-01 RX ADMIN — Medication 10 UNIT(S)/HR: at 04:38

## 2024-01-01 RX ADMIN — Medication 1 UNIT(S)/HR: at 19:35

## 2024-01-01 RX ADMIN — Medication 1 UNIT(S)/HR: at 22:00

## 2024-01-01 RX ADMIN — NOREPINEPHRINE BITARTRATE 4.49 MICROGRAM(S)/KG/MIN: 1 INJECTION, SOLUTION, CONCENTRATE INTRAVENOUS at 07:50

## 2024-01-01 RX ADMIN — CEFEPIME 100 MILLIGRAM(S): 2 INJECTION, POWDER, FOR SOLUTION INTRAVENOUS at 22:06

## 2024-01-01 RX ADMIN — CHLORHEXIDINE GLUCONATE 1 APPLICATION(S): 1.2 RINSE ORAL at 06:10

## 2024-01-01 RX ADMIN — Medication 100 MILLIGRAM(S): at 03:00

## 2024-01-01 RX ADMIN — PHENYLEPHRINE HYDROCHLORIDE 8.98 MICROGRAM(S)/KG/MIN: 10 INJECTION INTRAVENOUS at 08:39

## 2024-01-01 RX ADMIN — Medication 100 GRAM(S): at 02:08

## 2024-01-01 RX ADMIN — Medication 1 MILLIGRAM(S): at 17:00

## 2024-01-01 RX ADMIN — POTASSIUM CHLORIDE 100 MILLIEQUIVALENT(S): 600 TABLET, EXTENDED RELEASE ORAL at 10:05

## 2024-01-01 RX ADMIN — Medication 13 UNIT(S)/HR: at 06:10

## 2024-01-01 RX ADMIN — PROPOFOL 11.5 MICROGRAM(S)/KG/MIN: 10 INJECTION, EMULSION INTRAVENOUS at 06:57

## 2024-01-01 RX ADMIN — VASOPRESSIN 9 UNIT(S)/MIN: 20 INJECTION, SOLUTION INTRAVENOUS at 08:40

## 2024-01-01 RX ADMIN — Medication 2 PACKET(S): at 02:46

## 2024-01-01 RX ADMIN — Medication 25 GRAM(S): at 16:06

## 2024-01-01 RX ADMIN — ACETAMINOPHEN 500MG 400 MILLIGRAM(S): 500 TABLET, COATED ORAL at 15:13

## 2024-01-01 RX ADMIN — POTASSIUM CHLORIDE 40 MILLIEQUIVALENT(S): 600 TABLET, EXTENDED RELEASE ORAL at 10:14

## 2024-01-01 RX ADMIN — Medication 12 UNIT(S)/HR: at 20:49

## 2024-01-01 RX ADMIN — ACETAMINOPHEN 500MG 1000 MILLIGRAM(S): 500 TABLET, COATED ORAL at 15:35

## 2024-01-01 RX ADMIN — POVIDONE, POLYVINYL ALCOHOL 1 APPLICATION(S): 20; 27 SOLUTION OPHTHALMIC at 17:56

## 2024-01-01 RX ADMIN — Medication 300 MILLIGRAM(S): at 16:48

## 2024-01-01 RX ADMIN — Medication 25 GRAM(S): at 12:01

## 2024-01-01 RX ADMIN — CHLORHEXIDINE GLUCONATE 15 MILLILITER(S): 1.2 RINSE ORAL at 05:25

## 2024-01-01 RX ADMIN — POTASSIUM CHLORIDE 100 MILLIEQUIVALENT(S): 600 TABLET, EXTENDED RELEASE ORAL at 00:36

## 2024-01-01 RX ADMIN — Medication 5000 UNIT(S): at 22:36

## 2024-01-01 RX ADMIN — Medication 12 UNIT(S)/HR: at 02:47

## 2024-01-01 RX ADMIN — POTASSIUM CHLORIDE 100 MILLIEQUIVALENT(S): 600 TABLET, EXTENDED RELEASE ORAL at 17:42

## 2024-01-01 RX ADMIN — Medication 4 MILLIGRAM(S): at 01:31

## 2024-01-01 RX ADMIN — POTASSIUM CHLORIDE 100 MILLIEQUIVALENT(S): 600 TABLET, EXTENDED RELEASE ORAL at 23:11

## 2024-01-01 RX ADMIN — Medication 150 MILLILITER(S): at 07:18

## 2024-01-01 RX ADMIN — FENTANYL 100 MICROGRAM(S): 12 PATCH, EXTENDED RELEASE TRANSDERMAL at 02:45

## 2024-01-01 RX ADMIN — SODIUM BICARBONATE 50 MILLIEQUIVALENT(S): 84 INJECTION, SOLUTION INTRAVENOUS at 06:10

## 2024-01-01 RX ADMIN — Medication 200 GRAM(S): at 08:58

## 2024-01-01 RX ADMIN — POVIDONE, POLYVINYL ALCOHOL 1 APPLICATION(S): 20; 27 SOLUTION OPHTHALMIC at 06:09

## 2024-01-01 RX ADMIN — Medication 100 GRAM(S): at 14:57

## 2024-01-01 RX ADMIN — FENTANYL 100 MICROGRAM(S): 12 PATCH, EXTENDED RELEASE TRANSDERMAL at 03:00

## 2024-01-01 RX ADMIN — Medication 125 MILLILITER(S): at 00:52

## 2024-01-01 RX ADMIN — Medication 2 PACKET(S): at 16:06

## 2024-01-01 RX ADMIN — Medication 150 MILLILITER(S): at 09:01

## 2024-10-25 ENCOUNTER — INPATIENT (INPATIENT)
Facility: HOSPITAL | Age: 59
LOS: 3 days | Discharge: HOME | End: 2024-10-29
Attending: HOSPITALIST | Admitting: HOSPITALIST
Payer: MEDICAID

## 2024-10-25 VITALS
TEMPERATURE: 98 F | SYSTOLIC BLOOD PRESSURE: 195 MMHG | DIASTOLIC BLOOD PRESSURE: 116 MMHG | WEIGHT: 293 LBS | HEART RATE: 126 BPM | RESPIRATION RATE: 20 BRPM | OXYGEN SATURATION: 96 % | HEIGHT: 65 IN

## 2024-10-25 DIAGNOSIS — J35.8 OTHER CHRONIC DISEASES OF TONSILS AND ADENOIDS: ICD-10-CM

## 2024-10-25 LAB
ALBUMIN SERPL ELPH-MCNC: 3.9 G/DL — SIGNIFICANT CHANGE UP (ref 3.3–5)
ALP SERPL-CCNC: 91 U/L — SIGNIFICANT CHANGE UP (ref 40–120)
ALT FLD-CCNC: 11 U/L — SIGNIFICANT CHANGE UP (ref 4–33)
ANION GAP SERPL CALC-SCNC: 13 MMOL/L — SIGNIFICANT CHANGE UP (ref 7–14)
ANION GAP SERPL CALC-SCNC: 18 MMOL/L — HIGH (ref 7–14)
APTT BLD: 28.3 SEC — SIGNIFICANT CHANGE UP (ref 24.5–35.6)
AST SERPL-CCNC: 16 U/L — SIGNIFICANT CHANGE UP (ref 4–32)
BASOPHILS # BLD AUTO: 0.03 K/UL — SIGNIFICANT CHANGE UP (ref 0–0.2)
BASOPHILS NFR BLD AUTO: 0.3 % — SIGNIFICANT CHANGE UP (ref 0–2)
BILIRUB SERPL-MCNC: 0.4 MG/DL — SIGNIFICANT CHANGE UP (ref 0.2–1.2)
BLD GP AB SCN SERPL QL: NEGATIVE — SIGNIFICANT CHANGE UP
BUN SERPL-MCNC: 6 MG/DL — LOW (ref 7–23)
BUN SERPL-MCNC: 9 MG/DL — SIGNIFICANT CHANGE UP (ref 7–23)
CALCIUM SERPL-MCNC: 8.7 MG/DL — SIGNIFICANT CHANGE UP (ref 8.4–10.5)
CALCIUM SERPL-MCNC: 9.6 MG/DL — SIGNIFICANT CHANGE UP (ref 8.4–10.5)
CHLORIDE SERPL-SCNC: 102 MMOL/L — SIGNIFICANT CHANGE UP (ref 98–107)
CHLORIDE SERPL-SCNC: 98 MMOL/L — SIGNIFICANT CHANGE UP (ref 98–107)
CO2 SERPL-SCNC: 25 MMOL/L — SIGNIFICANT CHANGE UP (ref 22–31)
CO2 SERPL-SCNC: 25 MMOL/L — SIGNIFICANT CHANGE UP (ref 22–31)
CREAT SERPL-MCNC: 0.4 MG/DL — LOW (ref 0.5–1.3)
CREAT SERPL-MCNC: 0.5 MG/DL — SIGNIFICANT CHANGE UP (ref 0.5–1.3)
EGFR: 109 ML/MIN/1.73M2 — SIGNIFICANT CHANGE UP
EGFR: 115 ML/MIN/1.73M2 — SIGNIFICANT CHANGE UP
EOSINOPHIL # BLD AUTO: 0.05 K/UL — SIGNIFICANT CHANGE UP (ref 0–0.5)
EOSINOPHIL NFR BLD AUTO: 0.5 % — SIGNIFICANT CHANGE UP (ref 0–6)
GLUCOSE SERPL-MCNC: 190 MG/DL — HIGH (ref 70–99)
GLUCOSE SERPL-MCNC: 218 MG/DL — HIGH (ref 70–99)
HCG SERPL-ACNC: <1 MIU/ML — SIGNIFICANT CHANGE UP
HCT VFR BLD CALC: 40.3 % — SIGNIFICANT CHANGE UP (ref 34.5–45)
HGB BLD-MCNC: 13.3 G/DL — SIGNIFICANT CHANGE UP (ref 11.5–15.5)
IANC: 7.12 K/UL — SIGNIFICANT CHANGE UP (ref 1.8–7.4)
IMM GRANULOCYTES NFR BLD AUTO: 0.4 % — SIGNIFICANT CHANGE UP (ref 0–0.9)
INR BLD: 1.04 RATIO — SIGNIFICANT CHANGE UP (ref 0.85–1.16)
LYMPHOCYTES # BLD AUTO: 1.41 K/UL — SIGNIFICANT CHANGE UP (ref 1–3.3)
LYMPHOCYTES # BLD AUTO: 15.4 % — SIGNIFICANT CHANGE UP (ref 13–44)
MCHC RBC-ENTMCNC: 25.3 PG — LOW (ref 27–34)
MCHC RBC-ENTMCNC: 33 GM/DL — SIGNIFICANT CHANGE UP (ref 32–36)
MCV RBC AUTO: 76.8 FL — LOW (ref 80–100)
MONOCYTES # BLD AUTO: 0.52 K/UL — SIGNIFICANT CHANGE UP (ref 0–0.9)
MONOCYTES NFR BLD AUTO: 5.7 % — SIGNIFICANT CHANGE UP (ref 2–14)
NEUTROPHILS # BLD AUTO: 7.12 K/UL — SIGNIFICANT CHANGE UP (ref 1.8–7.4)
NEUTROPHILS NFR BLD AUTO: 77.7 % — HIGH (ref 43–77)
NRBC # BLD: 0 /100 WBCS — SIGNIFICANT CHANGE UP (ref 0–0)
NRBC # FLD: 0 K/UL — SIGNIFICANT CHANGE UP (ref 0–0)
PLATELET # BLD AUTO: 384 K/UL — SIGNIFICANT CHANGE UP (ref 150–400)
POTASSIUM SERPL-MCNC: 3.1 MMOL/L — LOW (ref 3.5–5.3)
POTASSIUM SERPL-MCNC: 3.3 MMOL/L — LOW (ref 3.5–5.3)
POTASSIUM SERPL-SCNC: 3.1 MMOL/L — LOW (ref 3.5–5.3)
POTASSIUM SERPL-SCNC: 3.3 MMOL/L — LOW (ref 3.5–5.3)
PROT SERPL-MCNC: 8.1 G/DL — SIGNIFICANT CHANGE UP (ref 6–8.3)
PROTHROM AB SERPL-ACNC: 12.1 SEC — SIGNIFICANT CHANGE UP (ref 9.9–13.4)
RBC # BLD: 5.25 M/UL — HIGH (ref 3.8–5.2)
RBC # FLD: 13.7 % — SIGNIFICANT CHANGE UP (ref 10.3–14.5)
RH IG SCN BLD-IMP: POSITIVE — SIGNIFICANT CHANGE UP
SODIUM SERPL-SCNC: 140 MMOL/L — SIGNIFICANT CHANGE UP (ref 135–145)
SODIUM SERPL-SCNC: 141 MMOL/L — SIGNIFICANT CHANGE UP (ref 135–145)
WBC # BLD: 9.17 K/UL — SIGNIFICANT CHANGE UP (ref 3.8–10.5)
WBC # FLD AUTO: 9.17 K/UL — SIGNIFICANT CHANGE UP (ref 3.8–10.5)

## 2024-10-25 PROCEDURE — 88305 TISSUE EXAM BY PATHOLOGIST: CPT | Mod: 26

## 2024-10-25 PROCEDURE — 70491 CT SOFT TISSUE NECK W/DYE: CPT | Mod: 26,MC

## 2024-10-25 PROCEDURE — 99285 EMERGENCY DEPT VISIT HI MDM: CPT

## 2024-10-25 PROCEDURE — 88365 INSITU HYBRIDIZATION (FISH): CPT | Mod: 26,59

## 2024-10-25 PROCEDURE — 88342 IMHCHEM/IMCYTCHM 1ST ANTB: CPT | Mod: 26

## 2024-10-25 PROCEDURE — 74177 CT ABD & PELVIS W/CONTRAST: CPT | Mod: 26,MC

## 2024-10-25 PROCEDURE — 88341 IMHCHEM/IMCYTCHM EA ADD ANTB: CPT | Mod: 26

## 2024-10-25 PROCEDURE — 71260 CT THORAX DX C+: CPT | Mod: 26,MC

## 2024-10-25 PROCEDURE — 88307 TISSUE EXAM BY PATHOLOGIST: CPT | Mod: 26

## 2024-10-25 PROCEDURE — 88374 M/PHMTRC ALYS ISHQUANT/SEMIQ: CPT | Mod: 26

## 2024-10-25 RX ORDER — DEXAMETHASONE 1.5 MG 1.5 MG/1
10 TABLET ORAL ONCE
Refills: 0 | Status: COMPLETED | OUTPATIENT
Start: 2024-10-25 | End: 2024-10-25

## 2024-10-25 RX ORDER — SODIUM CHLORIDE 9 MG/ML
1000 INJECTION, SOLUTION INTRAMUSCULAR; INTRAVENOUS; SUBCUTANEOUS ONCE
Refills: 0 | Status: COMPLETED | OUTPATIENT
Start: 2024-10-25 | End: 2024-10-25

## 2024-10-25 RX ADMIN — SODIUM CHLORIDE 1000 MILLILITER(S): 9 INJECTION, SOLUTION INTRAMUSCULAR; INTRAVENOUS; SUBCUTANEOUS at 14:01

## 2024-10-25 RX ADMIN — DEXAMETHASONE 1.5 MG 102 MILLIGRAM(S): 1.5 TABLET ORAL at 20:42

## 2024-10-25 NOTE — ED ADULT NURSE NOTE - CHIEF COMPLAINT QUOTE
patient states"  I am having trouble to swallow and I have swelling to my left side of the neck also endorses to she bit her tongue  and its swollen since a month " patient  unable to speak clearly. states she is spitting cold. swelling noted to left side of the neck. denies any PMH .denies taking any meds. patient did not seek any medical attention.

## 2024-10-25 NOTE — ED ADULT NURSE NOTE - OBJECTIVE STATEMENT
received pt to room 8, a&ox4, ambulatory at baseline, presents for 1 month of worsening L sided neck swelling with noted mass in throat with associated muffled voice, difficulty swallowing, and received pt to room 8, a&ox4, ambulatory at baseline, presents for 1 month of worsening L sided neck swelling with noted mass in throat with associated muffled voice, difficulty swallowing, and increase in secretions. endorses swollen tongue, decreased appetite. no drooling noted, able to speak but with difficulty. pt denies any fevers, chest pain, sob, n/v/d. respirations even and nonlabored on room air. no acute distress noted at this time. 20G IV placed in R forearm, labs drawn and sent. IVF infusing as ordered.

## 2024-10-25 NOTE — ED ADULT NURSE REASSESSMENT NOTE - NS ED NURSE REASSESS COMMENT FT1
Report received from TUAN [facility]. Patient is AOx4 and in no signs of acute distress. Airway patent and intact; no drooling noted. Respirations even and unlabored, chest rise symmetrical b/l. Patient medicated per chart. Patient admitted to medicine pending bed assignment. Comfort measures maintained. Bed in lowest position. Safety maintained.

## 2024-10-25 NOTE — ED ADULT TRIAGE NOTE - CHIEF COMPLAINT QUOTE
Not due she receives this 1 time monthly  
patient states"  I am having trouble to swallow and I have swelling to my left side of the neck also endorses to she bit her tongue  and its swollen since a month " patient  unable to speak clearly. states she is spitting cold. swelling noted to left side of the neck. denies any PMH .denies taking any meds. patient did not seek any medical attention.

## 2024-10-25 NOTE — ED PROVIDER NOTE - ATTENDING CONTRIBUTION TO CARE
Dr. Salazar: 57 yo female with no sig PMH, in ED with 5 weeks of worsening throat pain/swelling, sent to ED today by PCP after pt went for this and was found to have a throat/neck mass.  Pt endorses intermittent SOB associated with this, but otherwise no acute complaints.  On exam pt generall well appearing, in NAD, heart tachycardic and regular, lungs CTAB, abd NTND, extremities without swelling, strength 5/5 in all extremities and skin without rash.  Throat exam shows large (golfball-sized) left-sided pharyngeal mass with mass effect pushing uvula rightward, but normal tongue without elevation, and no obvious external face deformity/swelling.  Pt handling her secretions well but voice muffled.  ENT called shortly after arrival to room for direct visualization.    I, Coy Salazar MD, personally made/approved the management plan for this patient and take responsibility for the patient's management.

## 2024-10-25 NOTE — CONSULT NOTE ADULT - SUBJECTIVE AND OBJECTIVE BOX
HPI: 58y Female w no known medical hx who presents to ED with ~3 weeks of L neck swelling. Pt states prior to 3 weeks ago she developed a URI that resulted in throat pain along with L neck swelling. Her URI sx resolved but throat pain persisted resulting in dysphagia most significantly to solids > liquids, voice changes, and intermittently reporting difficulty breathing. Reports L neck swelling has improved from prior. Also reports that 3 weeks ago she bit her right lateral tongue and has since had an ulceration to the region that has not resolved. Denies f/c, weight loss, night sweats, no fam hx of HN cancer, no personal hx of RT to HN.   Never smoker, occasional etoh use.     Afebrile in ED, WBC 9.2    CT neck 10/25:  IMPRESSION:  1.  There is a 5.0 x 3.4 x 3.0 cm mass in the region of the left palatine   tonsil, suspicious for malignancy. The mass extends into the soft palate.   The mass abuts the contralateral palatine tonsil and markedly narrows the   oropharynx. The mass abuts the base of tongue and lingual surface of the   epiglottis.  2.  Enlarged, centrally necrotic left level 2/3 jayla mass measuring 5.7   x 4.1 cm. No enhancement in the portion of the left internal jugular vein   abutting the mass, suggesting severe compression/occlusion. The left   internal jugular vein is patent more proximally and distally.  3.  Numerous additional enlarged and/or abnormal appearing bilateral   level I-V cervical lymph nodes. Multiple enlarged bilateral subpectoral   and axillary lymph nodes, right greater than left. Findings suggest   metastatic disease.      Allergies    No Known Allergies    Intolerances        PAST MEDICAL & SURGICAL HISTORY:      SOCIAL HISTORY:  As above    FAMILY HISTORY:      REVIEW OF SYSTEMS    General:	  As per HPI      MEDICATIONS:        Vital Signs Last 24 Hrs  T(C): 37.1 (25 Oct 2024 17:11), Max: 37.1 (25 Oct 2024 17:11)  T(F): 98.7 (25 Oct 2024 17:11), Max: 98.7 (25 Oct 2024 17:11)  HR: 70 (25 Oct 2024 17:11) (70 - 126)  BP: 155/71 (25 Oct 2024 17:11) (155/71 - 195/116)  BP(mean): --  RR: 16 (25 Oct 2024 17:11) (16 - 20)  SpO2: 99% (25 Oct 2024 17:11) (96% - 99%)    Parameters below as of 25 Oct 2024 17:11  Patient On (Oxygen Delivery Method): room air        LABS:  CBC-    10-25    140  |  102  |  6[L]  ----------------------------<  190[H]  3.1[L]   |  25  |  0.40[L]    Ca    8.7      25 Oct 2024 17:13    TPro  8.1  /  Alb  3.9  /  TBili  0.4  /  DBili  x   /  AST  16  /  ALT  11  /  AlkPhos  91  10-25    Coagulation Studies-  PT/INR - ( 25 Oct 2024 13:35 )   PT: 12.1 sec;   INR: 1.04 ratio         PTT - ( 25 Oct 2024 13:35 )  PTT:28.3 sec  Endocrine Panel-  --  --  8.7 mg/dL  --  --  9.6 mg/dL        PHYSICAL EXAM:    ENT EXAM-   Constitutional: Well-developed, well-nourished.   Voice: muffled, spitting up secretions   Head:  normocephalic, atraumatic.   Ears:  External ears normal  Nose:  Septum intact.  Inferior turbinates normal bilateral  OC/OP: large left tonsillar mass obliterating view of posterior pharyngeal wall, right lateral tongue with ~1cm ulcerative, raised lesion   Neck: right neck levels II-IV hard, firm non tender mass ~5-6 cm in length    LARYNGOSCOPY EXAM:     -Verbal consent was obtained from patient prior to procedure.    Indication:    Anesthesia: None    Flexible laryngoscopy was performed and revealed the following:    -- Nasopharynx had no mass or exudate.  Large left tonsillar mass extending into BOT, cannot fully visualize vallecula  Airway is widely patent without evidence of extension into the supraglottis/glottis     The patient tolerated the procedure well.    Procedure: biopsy of right lateral tongue lesion  The right lateral tongue was anesthetized with 4cc of 1:100,000 lido with epi. A 4mm punch biopsy was taken of the lesion including a rim of normal tissue and an 11 blade was used to cut the biopsy specimen at its base and remove it from the tongue. Silver nitrate cautery was used to obtain hemostasis along with afrin and pressure. The specimen was sent for permanent in formalin. The patient tolerated the procedure well.    Procedure: biopsy of left tonsillar mass  Using cup forceps, several samples from the left tonsil were taken. The specimen was sent in formalin for permanent and fresh for lymphoma workup. Silver nitrate was used to obtain hemostasis. The patient tolerated the procedure well.     RADIOLOGY & ADDITIONAL STUDIES:      Assessment/Plan:    58y Female w no known medical hx who presents to ED with ~3 weeks of L neck swelling, throat pain and dysphagia along with voice changes. Also reports that 3 weeks ago she bit her right lateral tongue and has since had an ulceration to the region that has not resolved. CT scan with 5.0 x 3.4 x 3.0 cm mass in the region of the left palatine tonsil abutting BOT and lingual surface of epiglottis with bilateral cervical LAD, suspicious for malignancy. Exam significant for large L tonsillar mass, R lateral tongue lesion, and left levels II-IV LAD, and scope shows airway is widely patent. Pt now s/p bedside bx of R lateral tongue and L tonsillar mass.     - fu path  - appreciate MO/RO recs  - rec dex 10q8 x3  - SLP eval given pt's reported difficulty swallowing

## 2024-10-25 NOTE — CONSULT NOTE ADULT - SUBJECTIVE AND OBJECTIVE BOX
HPI: 58y Female w no known PMH who presents for eval of L neck swelling, change in voice, and difficulty swallowing solids > liquids x1 month. In ED pt noted to have muffled voice with left neck edema.      Afebrile in ED, WBC 9.2       Allergies    No Known Allergies    Intolerances        PAST MEDICAL & SURGICAL HISTORY:      SOCIAL HISTORY:  Tobacco History:  ETOH Use:   Drug Use:     FAMILY HISTORY:      REVIEW OF SYSTEMS    General:	  As per HPI      MEDICATIONS:        Vital Signs Last 24 Hrs  T(C): 36.7 (25 Oct 2024 13:21), Max: 36.7 (25 Oct 2024 13:21)  T(F): 98 (25 Oct 2024 13:21), Max: 98 (25 Oct 2024 13:21)  HR: 98 (25 Oct 2024 13:21) (98 - 126)  BP: 156/73 (25 Oct 2024 13:21) (156/73 - 195/116)  BP(mean): --  RR: 20 (25 Oct 2024 13:21) (20 - 20)  SpO2: 98% (25 Oct 2024 13:21) (96% - 98%)    Parameters below as of 25 Oct 2024 13:21  Patient On (Oxygen Delivery Method): room air        LABS:  CBC-          Coagulation Studies-  PT/INR - ( 25 Oct 2024 13:35 )   PT: 12.1 sec;   INR: 1.04 ratio         PTT - ( 25 Oct 2024 13:35 )  PTT:28.3 sec  Endocrine Panel-        PHYSICAL EXAM:    ENT EXAM-   Constitutional: Well-developed, well-nourished.   Voice: No hoarseness.     Head:  normocephalic, atraumatic.   Ears:  External ears normal  Nose:  Septum intact, midline, deviated.  Inferior turbinates normal bilateral  OC/OP: Tongue midline, tonsils present/absent. Floor of mouth, buccal mucosa, lips, hard palate, soft palate, uvula, posterior pharyngeal wall normal.  Mucosa moist.  Neck:  Trachea midline.  Thyroid, parotid and submandibular glands normal.  Lymph:  No cervical adenopathy.    LARYNGOSCOPY EXAM:     -Verbal consent was obtained from patient prior to procedure.    Indication:    Anesthesia: None    Flexible laryngoscopy was performed and revealed the following:    -- Nasopharynx had no mass or exudate.    -- Base of tongue was symmetric and not enlarged.    -- Vallecula was clear    -- Epiglottis, both aryepiglottic folds and both false vocal folds were normal    -- Arytenoids both without edema and erythema     -- True vocal folds were fully mobile and without lesions.     -- Post cricoid area was clear.    -- Interarytenoid edema was absent    The patient tolerated the procedure well.      RADIOLOGY & ADDITIONAL STUDIES:      Assessment/Plan:  58y Female

## 2024-10-25 NOTE — ED ADULT TRIAGE NOTE - SOURCE OF INFORMATION
"  Subjective:     Ramakrishna Menezes Jr. is a 34 y.o. 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 No pertinent past medical history.    History reviewed. No pertinent surgical history.    Social History     Socioeconomic History    Marital status: Not on file     Spouse name: Not on file    Number of children: Not on file    Years of education: Not on file    Highest education level: Not on file   Occupational History    Not on file   Tobacco Use    Smoking status: Not on file    Smokeless tobacco: Not on file   Substance and Sexual Activity    Alcohol use: Not on file    Drug use: Not on file    Sexual activity: Not on file   Other Topics Concern    Not on file   Social History Narrative    Not on file     Social Determinants of Health     Financial Resource Strain: Not on file   Food Insecurity: Not on file   Transportation Needs: Not on file   Physical Activity: Not on file   Stress: Not on file   Social Connections: Not on file   Intimate Partner Violence: Not on file   Housing Stability: Not on file        History reviewed. No pertinent family history.     No Known Allergies    ROS     Objective:   BP (!) 138/90   Pulse 86   Temp 36.4 °C (97.6 °F) (Temporal)   Resp 16   Ht 1.676 m (5' 6\")   SpO2 95%     Physical Exam  Vitals reviewed.   Constitutional:       General: He is not in acute distress.     Appearance: He is well-developed. He is not toxic-appearing.   HENT:      Head: Normocephalic and atraumatic.      Right Ear: External ear normal.      Left Ear: External ear normal.      Ears:      Comments: Bilateral cerumen, Upper TM visible.      Nose: Nose normal.      Mouth/Throat:      Mouth: Mucous membranes are moist.      Pharynx: Oropharynx is clear.   Eyes:      Conjunctiva/sclera: Conjunctivae normal.   Cardiovascular:      Rate and Rhythm: Normal rate and regular rhythm.   Pulmonary:      Effort: Pulmonary effort is normal.   Abdominal:      General: " Abdomen is protuberant.      Palpations: There is no shifting dullness or pulsatile mass.      Tenderness: There is no abdominal tenderness.   Musculoskeletal:         General: Normal range of motion.      Cervical back: Normal range of motion and neck supple.   Skin:     General: Skin is warm and dry.      Findings: No rash.   Neurological:      Mental Status: He is alert and oriented to person, place, and time.      GCS: GCS eye subscore is 4. GCS verbal subscore is 5. GCS motor subscore is 6.      Coordination: Coordination is intact.      Gait: Gait is intact.      Comments: Demonstrated ability to squat.    Psychiatric:         Speech: Speech normal.         Behavior: Behavior normal.         Thought Content: Thought content normal.         Judgment: Judgment normal.       Assessment/Plan:   1. Encounter for physical examination related to employment    2. Elevated blood pressure reading  See scanned documentation for physical and health questionnaire.     Discussed elevation in blood pressure. Patient states he just moved here, this week. Has been unpacking, and not sleeping or eating well. Advised rechecking his BP, with PCP f/u for persistent elevation.    Differential diagnosis, natural history, supportive care, and indications for immediate follow-up discussed.   Patient

## 2024-10-25 NOTE — ED ADULT NURSE NOTE - CAS EDN DISCHARGE ASSESSMENT
Increase Namenda to 5 mg in the Am, 10 mg at night x 7 days. Then take 10 mg twice daily thereafter. Please advise on potential side effects including confusion.    Alert and oriented to person, place and time

## 2024-10-25 NOTE — ED ADULT NURSE REASSESSMENT NOTE - NS ED NURSE REASSESS COMMENT FT1
patient is resting comfortably, respirations are even and nonlabored on room air. no requests at this time. offering no further medical complaints. reporting improvement of symptoms since time of arrival to ED. comfort and safety maintained. pt pending CT results and dispo

## 2024-10-25 NOTE — ED PROVIDER NOTE - PROGRESS NOTE DETAILS
MD Judy (PGY-3) Received sign out on patient. In brief, 58-year-old female with no stated past medical history, presenting to ED with left neck swelling, voice changes, difficulty swallowing for a month.  Patient's CAT scan concerning for tonsillar malignancy.  ENT consulted.  Pending ENT recommendations.

## 2024-10-25 NOTE — ED PROVIDER NOTE - CLINICAL SUMMARY MEDICAL DECISION MAKING FREE TEXT BOX
58 female with no known past medical history however patient has not gone to the physician for years presenting with left neck swelling, change in voice, difficulty swallowing for 1 month.  Friend at bedside for collateral.  Patient says that her symptoms started 1 month ago but she was afraid to go to the physician until today.  Her PMD sent her into the emergency room.  Patient is able to tolerate liquids but is not able to swallow solids.  Denies any difficulty breathing.  No fever, chest pain, nausea, vomiting.  Vitals significant for blood pressure 195/116, heart rate 126.  On exam: Patient with muffled voice and left neck edema with large mass palpated.  Intraorally patient has a large irregularly shaped mass with deviation of the uvula to the right.  Tolerating secretions.  Lungs clear to auscultation bilaterally.  Heart tachycardic and regular rhythm.  Abdomen soft and nontender.  No lower extremity edema.  Differential diagnosis includes but not limited to: Malignancy, less likely abscess or Alex's angina.  Plan: ENT to scope patient's, labs, CT neck soft tissue with IV contrast, CT chest, abdomen, pelvis, EKG, IV fluids.  Will reassess.  To be admitted. 58 female with no known past medical history however patient has not gone to the physician for years presenting with left neck swelling, change in voice, difficulty swallowing for 1 month.  Friend at bedside for collateral.  Patient says that her symptoms started 1 month ago but she was afraid to go to the physician until today.  Her PMD sent her into the emergency room.  Patient is able to tolerate liquids but is not able to swallow solids.  Endorses intermittent SOB, none currently.  No fever, chest pain, nausea, vomiting.  Vitals significant for blood pressure 195/116, heart rate 126.  On exam: Patient with muffled voice and left neck edema with large mass palpated.  Intraorally patient has a large irregularly shaped mass with deviation of the uvula to the right.  Tolerating secretions.  Lungs clear to auscultation bilaterally.  Heart tachycardic and regular rhythm.  Abdomen soft and nontender.  No lower extremity edema.  Differential diagnosis includes but not limited to: Malignancy, less likely abscess or Alex's angina.  Plan: ENT to scope patient's, labs, CT neck soft tissue with IV contrast, CT chest, abdomen, pelvis, EKG, IV fluids.  Will reassess.  To be admitted.

## 2024-10-26 DIAGNOSIS — Z90.49 ACQUIRED ABSENCE OF OTHER SPECIFIED PARTS OF DIGESTIVE TRACT: Chronic | ICD-10-CM

## 2024-10-26 DIAGNOSIS — J35.8 OTHER CHRONIC DISEASES OF TONSILS AND ADENOIDS: ICD-10-CM

## 2024-10-26 DIAGNOSIS — Z29.9 ENCOUNTER FOR PROPHYLACTIC MEASURES, UNSPECIFIED: ICD-10-CM

## 2024-10-26 DIAGNOSIS — K14.8 OTHER DISEASES OF TONGUE: ICD-10-CM

## 2024-10-26 DIAGNOSIS — R03.0 ELEVATED BLOOD-PRESSURE READING, WITHOUT DIAGNOSIS OF HYPERTENSION: ICD-10-CM

## 2024-10-26 DIAGNOSIS — R13.10 DYSPHAGIA, UNSPECIFIED: ICD-10-CM

## 2024-10-26 LAB
ALBUMIN SERPL ELPH-MCNC: 3.6 G/DL — SIGNIFICANT CHANGE UP (ref 3.3–5)
ALP SERPL-CCNC: 84 U/L — SIGNIFICANT CHANGE UP (ref 40–120)
ALT FLD-CCNC: 11 U/L — SIGNIFICANT CHANGE UP (ref 4–33)
ANION GAP SERPL CALC-SCNC: 17 MMOL/L — HIGH (ref 7–14)
AST SERPL-CCNC: 15 U/L — SIGNIFICANT CHANGE UP (ref 4–32)
BASOPHILS # BLD AUTO: 0.01 K/UL — SIGNIFICANT CHANGE UP (ref 0–0.2)
BASOPHILS NFR BLD AUTO: 0.1 % — SIGNIFICANT CHANGE UP (ref 0–2)
BILIRUB SERPL-MCNC: 0.4 MG/DL — SIGNIFICANT CHANGE UP (ref 0.2–1.2)
BUN SERPL-MCNC: 6 MG/DL — LOW (ref 7–23)
CALCIUM SERPL-MCNC: 9 MG/DL — SIGNIFICANT CHANGE UP (ref 8.4–10.5)
CHLORIDE SERPL-SCNC: 100 MMOL/L — SIGNIFICANT CHANGE UP (ref 98–107)
CO2 SERPL-SCNC: 22 MMOL/L — SIGNIFICANT CHANGE UP (ref 22–31)
CREAT SERPL-MCNC: 0.41 MG/DL — LOW (ref 0.5–1.3)
EGFR: 115 ML/MIN/1.73M2 — SIGNIFICANT CHANGE UP
EOSINOPHIL # BLD AUTO: 0 K/UL — SIGNIFICANT CHANGE UP (ref 0–0.5)
EOSINOPHIL NFR BLD AUTO: 0 % — SIGNIFICANT CHANGE UP (ref 0–6)
GLUCOSE SERPL-MCNC: 233 MG/DL — HIGH (ref 70–99)
HCT VFR BLD CALC: 38.9 % — SIGNIFICANT CHANGE UP (ref 34.5–45)
HGB BLD-MCNC: 13.1 G/DL — SIGNIFICANT CHANGE UP (ref 11.5–15.5)
IANC: 9.06 K/UL — HIGH (ref 1.8–7.4)
IMM GRANULOCYTES NFR BLD AUTO: 1.4 % — HIGH (ref 0–0.9)
LYMPHOCYTES # BLD AUTO: 0.59 K/UL — LOW (ref 1–3.3)
LYMPHOCYTES # BLD AUTO: 5.9 % — LOW (ref 13–44)
MCHC RBC-ENTMCNC: 25.8 PG — LOW (ref 27–34)
MCHC RBC-ENTMCNC: 33.7 GM/DL — SIGNIFICANT CHANGE UP (ref 32–36)
MCV RBC AUTO: 76.7 FL — LOW (ref 80–100)
MONOCYTES # BLD AUTO: 0.17 K/UL — SIGNIFICANT CHANGE UP (ref 0–0.9)
MONOCYTES NFR BLD AUTO: 1.7 % — LOW (ref 2–14)
MRSA PCR RESULT.: SIGNIFICANT CHANGE UP
NEUTROPHILS # BLD AUTO: 9.06 K/UL — HIGH (ref 1.8–7.4)
NEUTROPHILS NFR BLD AUTO: 90.9 % — HIGH (ref 43–77)
NRBC # BLD: 0 /100 WBCS — SIGNIFICANT CHANGE UP (ref 0–0)
NRBC # FLD: 0 K/UL — SIGNIFICANT CHANGE UP (ref 0–0)
PLATELET # BLD AUTO: 351 K/UL — SIGNIFICANT CHANGE UP (ref 150–400)
POTASSIUM SERPL-MCNC: 3.6 MMOL/L — SIGNIFICANT CHANGE UP (ref 3.5–5.3)
POTASSIUM SERPL-SCNC: 3.6 MMOL/L — SIGNIFICANT CHANGE UP (ref 3.5–5.3)
PROT SERPL-MCNC: 7.5 G/DL — SIGNIFICANT CHANGE UP (ref 6–8.3)
RBC # BLD: 5.07 M/UL — SIGNIFICANT CHANGE UP (ref 3.8–5.2)
RBC # FLD: 13.4 % — SIGNIFICANT CHANGE UP (ref 10.3–14.5)
S AUREUS DNA NOSE QL NAA+PROBE: SIGNIFICANT CHANGE UP
SODIUM SERPL-SCNC: 139 MMOL/L — SIGNIFICANT CHANGE UP (ref 135–145)
WBC # BLD: 9.97 K/UL — SIGNIFICANT CHANGE UP (ref 3.8–10.5)
WBC # FLD AUTO: 9.97 K/UL — SIGNIFICANT CHANGE UP (ref 3.8–10.5)

## 2024-10-26 PROCEDURE — 99254 IP/OBS CNSLTJ NEW/EST MOD 60: CPT | Mod: GC

## 2024-10-26 PROCEDURE — 99223 1ST HOSP IP/OBS HIGH 75: CPT

## 2024-10-26 RX ORDER — ACETAMINOPHEN 500 MG
650 TABLET ORAL EVERY 6 HOURS
Refills: 0 | Status: DISCONTINUED | OUTPATIENT
Start: 2024-10-26 | End: 2024-10-29

## 2024-10-26 RX ORDER — AMLODIPINE BESYLATE 10 MG
2.5 TABLET ORAL DAILY
Refills: 0 | Status: DISCONTINUED | OUTPATIENT
Start: 2024-10-26 | End: 2024-10-27

## 2024-10-26 RX ORDER — POTASSIUM CHLORIDE 10 MEQ
10 TABLET, EXTENDED RELEASE ORAL
Refills: 0 | Status: COMPLETED | OUTPATIENT
Start: 2024-10-26 | End: 2024-10-26

## 2024-10-26 RX ORDER — MELATONIN 5 MG
3 TABLET ORAL AT BEDTIME
Refills: 0 | Status: DISCONTINUED | OUTPATIENT
Start: 2024-10-26 | End: 2024-10-29

## 2024-10-26 RX ORDER — DEXAMETHASONE 1.5 MG 1.5 MG/1
10 TABLET ORAL EVERY 8 HOURS
Refills: 0 | Status: COMPLETED | OUTPATIENT
Start: 2024-10-26 | End: 2024-10-26

## 2024-10-26 RX ADMIN — Medication 2.5 MILLIGRAM(S): at 06:40

## 2024-10-26 RX ADMIN — Medication 100 MILLIEQUIVALENT(S): at 04:33

## 2024-10-26 RX ADMIN — Medication 100 MILLIEQUIVALENT(S): at 01:50

## 2024-10-26 RX ADMIN — DEXAMETHASONE 1.5 MG 102 MILLIGRAM(S): 1.5 TABLET ORAL at 12:02

## 2024-10-26 RX ADMIN — Medication 100 MILLIEQUIVALENT(S): at 02:53

## 2024-10-26 RX ADMIN — DEXAMETHASONE 1.5 MG 102 MILLIGRAM(S): 1.5 TABLET ORAL at 06:56

## 2024-10-26 NOTE — CONSULT NOTE ADULT - ASSESSMENT
58F with no PMH admitted with 3 weeks neck swelling, found to have tonsillar mass with enlarged b/l cervical, subpectoral, and axillary lymph nodes.     #Tonsillar Mass with b/l LN and necrotic jayla mass with LIJ vein compression  - ddx oropharyngeal cancer vs lymphoma vs other  - s/p CT C/A/P for staging showing lymphadenopathy in axillary and cervical regions  - s/p biopsy with ENT, await pathology  - Appreciate further ENT recs  - pt will likely require followup with [facility]; please contact us if discharge is planned    Recommendations are preliminary. To be seen and discussed with attending.   Beth Polo MD PGY-4  Hematology/Oncology Fellow  Available on MS TEAMS  Pagers: [facility] [phone]; [facility] [phone]  58F with no PMH admitted with 3 weeks neck swelling, found to have tonsillar mass with enlarged b/l cervical, subpectoral, and axillary lymph nodes.     #Tonsillar Mass with b/l LN and necrotic jayla mass with LIJ vein compression  - ddx oropharyngeal cancer vs lymphoma vs other  - s/p CT C/A/P for staging showing lymphadenopathy in axillary and cervical regions  - s/p biopsy with ENT, await pathology: flow cytometry and hematopathology pending in lab. (Surgical pathology appears to have be cancelled; Please check with lab).  - Appreciate further ENT recs  - pt will likely require followup with [facility]; please contact us if discharge is planned    Recommendations are preliminary. To be seen and discussed with attending.   Beth Polo MD PGY-4  Hematology/Oncology Fellow  Available on MS TEAMS  Pagers: [phone]; [facility] [phone]  58F with no PMH admitted with 3 weeks neck swelling, found to have tonsillar mass with enlarged b/l cervical, subpectoral, and axillary lymph nodes.     #Tonsillar Mass with b/l LN and necrotic jayla mass with LIJ vein compression  - ddx oropharyngeal cancer vs lymphoma vs other  - s/p CT C/A/P for staging showing lymphadenopathy in axillary and cervical regions  - s/p biopsy with ENT, await pathology: flow cytometry and hematopathology pending in lab. (Surgical pathology appears to have be cancelled; Please check with lab).  - Please send HIV, acute hepatitis panel, EBV, LDH, uric acid, d-dimer, fibrinogen  - Appreciate further ENT recs  - pt will likely require followup with [facility]; please contact us if discharge is planned    Recommendations are preliminary. To be seen and discussed with attending.   Beth Polo MD PGY-4  Hematology/Oncology Fellow  Available on MS TEAMS  Pagers: [phone]; [facility] [phone]  58F with no PMH admitted with 3 weeks neck swelling, found to have tonsillar mass with enlarged b/l cervical, subpectoral, and axillary lymph nodes.     #Tonsillar Mass with b/l LN and necrotic jayla mass with LIJ vein compression  - ddx oropharyngeal cancer vs lymphoma vs other  - s/p CT C/A/P for staging showing lymphadenopathy in axillary and cervical regions  - s/p biopsy with ENT, await pathology: flow cytometry and hematopathology pending in lab. (Surgical pathology appears to have be cancelled; Please check with lab).  - Consider sampling axillary lymph node to assist with staging if pathology concerning for head and neck SCC  - Please send HIV, acute hepatitis panel, EBV, LDH, uric acid, d-dimer, fibrinogen  - Appreciate further ENT recs  - pt will likely require followup with [facility]; please contact us if discharge is planned    Recommendations are preliminary. To be seen and discussed with attending.   Beth Polo MD PGY-4  Hematology/Oncology Fellow  Available on MS TEAMS  Pagers: [phone]; [facility] [phone]  58F with no PMH admitted with 3 weeks neck swelling, found to have tonsillar mass with enlarged b/l cervical, subpectoral, and axillary lymph nodes.     #Tonsillar Mass with b/l LN and necrotic jayla mass with LIJ vein compression  - ddx oropharyngeal cancer vs lymphoma vs other  - s/p CT C/A/P for staging showing lymphadenopathy in axillary and cervical regions  - s/p biopsy with ENT  [ ] await pathology: flow cytometry and hematopathology pending in lab.  - Consider sampling axillary lymph node to assist with staging if pathology concerning for head and neck SCC rather than lymphoma  - Please send HIV, acute hepatitis panel, EBV, LDH, uric acid, d-dimer, fibrinogen  - Appreciate further ENT recs  - airway monitoring  - pt will likely require followup with [facility]; please contact oncology if discharge is planned    Seen and discussed with attending Dr Rose,    Beth Polo MD PGY-4  Hematology/Oncology Fellow  Available on MS TEAMS  Pagers: [phone]; [facility] [phone]

## 2024-10-26 NOTE — CONSULT NOTE ADULT - SUBJECTIVE AND OBJECTIVE BOX
HEMATOLOGY ONCOLOGY CONSULT     Patient is a 58y old  Female who presents with a chief complaint of hoarse voice, L neck swelling (26 Oct 2024 00:03)      HPI:  58F with no significant PMHx presenting with hoarse voice, L neck swelling and dysphagia x1 month. The patient does not follow with physicians routinely and is not on any meds for any medical problems. About a month ago she noticed dysphagia to solids and L neck swelling. She is able to swallow liquids fine. She denies fevers, chills, weight loss, CP, abdominal pain, vomiting, diarrhea,  complaints, LE edema. Notes some mild SOB but currently without. She bit her tongue 3 weeks ago on the right lateral side and since noticed an ulceration that has not resolved. No prior hx malignancy and denies smoking.    Seen by ENT in ED   CT scan with 5.0 x 3.4 x 3.0 cm mass in the region of the left palatine tonsil abutting BOT and lingual surface of epiglottis with bilateral cervical LAD, suspicious for malignancy. Exam significant for large L tonsillar mass, R lateral tongue lesion, and left levels II-IV LAD, and scope shows airway is widely patent. Pt now s/p bedside bx of R lateral tongue and L tonsillar mass.    (26 Oct 2024 00:03)       ROS:  Negative except for:    PAST MEDICAL & SURGICAL HISTORY:  Tonsillar mass      Tongue lesion      S/P appendectomy          SOCIAL HISTORY:    FAMILY HISTORY:  No pertinent family history in first degree relatives        MEDICATIONS  (STANDING):  amLODIPine   Tablet 2.5 milliGRAM(s) Oral daily  dexAMETHasone  IVPB 10 milliGRAM(s) IV Intermittent every 8 hours    MEDICATIONS  (PRN):  acetaminophen     Tablet .. 650 milliGRAM(s) Oral every 6 hours PRN Temp greater or equal to 38C (100.4F), Mild Pain (1 - 3)  melatonin 3 milliGRAM(s) Oral at bedtime PRN Insomnia      Allergies    No Known Allergies    Intolerances        Vital Signs Last 24 Hrs  T(C): 36.4 (26 Oct 2024 05:21), Max: 37.1 (25 Oct 2024 17:11)  T(F): 97.5 (26 Oct 2024 05:21), Max: 98.7 (25 Oct 2024 17:11)  HR: 90 (26 Oct 2024 05:21) (70 - 126)  BP: 160/90 (26 Oct 2024 06:34) (143/85 - 195/116)  BP(mean): 110 (25 Oct 2024 21:26) (110 - 110)  RR: 18 (26 Oct 2024 05:21) (16 - 20)  SpO2: 100% (26 Oct 2024 05:21) (95% - 100%)    Parameters below as of 26 Oct 2024 05:21  Patient On (Oxygen Delivery Method): room air        PHYSICAL EXAM  General: adult in NAD  HEENT: clear oropharynx, anicteric sclera, pink conjunctiva  Neck: supple  CV: normal S1/S2 with no murmur rubs or gallops  Lungs: positive air movement b/l ant lungs,clear to auscultation, no wheezes, no rales  Abdomen: soft non-tender non-distended, no hepatosplenomegaly  Ext: no clubbing cyanosis or edema  Skin: no rashes and no petechiae  Neuro: alert and oriented X 4, no focal deficits      LABS:                          13.1   9.97  )-----------( 351      ( 26 Oct 2024 06:12 )             38.9         Mean Cell Volume : 76.7 fL  Mean Cell Hemoglobin : 25.8 pg  Mean Cell Hemoglobin Concentration : 33.7 gm/dL  Auto Neutrophil # : 9.06 K/uL  Auto Lymphocyte # : 0.59 K/uL  Auto Monocyte # : 0.17 K/uL  Auto Eosinophil # : 0.00 K/uL  Auto Basophil # : 0.01 K/uL  Auto Neutrophil % : 90.9 %  Auto Lymphocyte % : 5.9 %  Auto Monocyte % : 1.7 %  Auto Eosinophil % : 0.0 %  Auto Basophil % : 0.1 %      10-26    139  |  100  |  6[L]  ----------------------------<  233[H]  3.6   |  22  |  0.41[L]    Ca    9.0      26 Oct 2024 06:12    TPro  7.5  /  Alb  3.6  /  TBili  0.4  /  DBili  x   /  AST  15  /  ALT  11  /  AlkPhos  84  10-26      PT/INR - ( 25 Oct 2024 13:35 )   PT: 12.1 sec;   INR: 1.04 ratio         PTT - ( 25 Oct 2024 13:35 )  PTT:28.3 sec                BLOOD SMEAR INTERPRETATION:       RADIOLOGY & ADDITIONAL STUDIES:    < from: CT Neck Soft Tissue w/ IV Cont (10.25.24 @ 15:33) >  1.  There is a 5.0 x 3.4 x 3.0 cm mass in the region of the left palatine   tonsil, suspicious for malignancy. The mass extends into the soft palate.   The mass abuts the contralateral palatine tonsil and markedly narrows the   oropharynx. The mass abuts the base of tongue and lingual surface of the   epiglottis.  2.  Enlarged, centrally necrotic left level 2/3 jayla mass measuring 5.7   x 4.1 cm. No enhancement in the portion of the left internal jugular vein   abutting the mass, suggesting severe compression/occlusion. The left   internal jugular vein is patent more proximally and distally.  3.  Numerous additional enlarged and/or abnormal appearing bilateral   level I-V cervical lymph nodes. Multiple enlarged bilateral subpectoral   and axillary lymph nodes, right greater than left. Findings suggest   metastatic disease.    < end of copied text >  < from: CT Abdomen and Pelvis w/ IV Cont (10.25.24 @ 15:33) >  Left supraclavicular and bilateral axillary lymphadenopathy. Slightly   prominent lymph nodes in the abdomen and pelvis. No splenomegaly.   Malignancy is possible.    < end of copied text >     HEMATOLOGY ONCOLOGY CONSULT     Patient is a 58y old  Female who presents with a chief complaint of hoarse voice, L neck swelling (26 Oct 2024 00:03)      HPI:  58F with no significant PMHx presenting with hoarse voice, L neck swelling and dysphagia x1 month. The patient does not follow with physicians routinely and is not on any meds for any medical problems. About a month ago she noticed dysphagia to solids and L neck swelling. She is able to swallow liquids fine. She denies fevers, chills, weight loss, CP, abdominal pain, vomiting, diarrhea,  complaints, LE edema. Notes some mild SOB but currently without. She bit her tongue 3 weeks ago on the right lateral side and since noticed an ulceration that has not resolved. No prior hx malignancy and denies smoking.    Seen by ENT in ED   CT scan with 5.0 x 3.4 x 3.0 cm mass in the region of the left palatine tonsil abutting BOT and lingual surface of epiglottis with bilateral cervical LAD, suspicious for malignancy. Exam significant for large L tonsillar mass, R lateral tongue lesion, and left levels II-IV LAD, and scope shows airway is widely patent. Pt now s/p bedside bx of R lateral tongue and L tonsillar mass.    (26 Oct 2024 00:03)     Oncology consulted for new tonsillar mass with LIJ compression.  On interview pt states she is upset that she was told it could be cancer without definitive pathology confirming this. She denies fevers, night sweats, or chills.  We discussed that we will wait for pathology and followup.     ROS:  Negative except for:    PAST MEDICAL & SURGICAL HISTORY:  Tonsillar mass      Tongue lesion      S/P appendectomy          SOCIAL HISTORY:    FAMILY HISTORY:  No pertinent family history in first degree relatives        MEDICATIONS  (STANDING):  amLODIPine   Tablet 2.5 milliGRAM(s) Oral daily  dexAMETHasone  IVPB 10 milliGRAM(s) IV Intermittent every 8 hours    MEDICATIONS  (PRN):  acetaminophen     Tablet .. 650 milliGRAM(s) Oral every 6 hours PRN Temp greater or equal to 38C (100.4F), Mild Pain (1 - 3)  melatonin 3 milliGRAM(s) Oral at bedtime PRN Insomnia      Allergies    No Known Allergies    Intolerances        Vital Signs Last 24 Hrs  T(C): 36.4 (26 Oct 2024 05:21), Max: 37.1 (25 Oct 2024 17:11)  T(F): 97.5 (26 Oct 2024 05:21), Max: 98.7 (25 Oct 2024 17:11)  HR: 90 (26 Oct 2024 05:21) (70 - 126)  BP: 160/90 (26 Oct 2024 06:34) (143/85 - 195/116)  BP(mean): 110 (25 Oct 2024 21:26) (110 - 110)  RR: 18 (26 Oct 2024 05:21) (16 - 20)  SpO2: 100% (26 Oct 2024 05:21) (95% - 100%)    Parameters below as of 26 Oct 2024 05:21  Patient On (Oxygen Delivery Method): room air        PHYSICAL EXAM  General: adult in NAD  HEENT: hoarse voice, neck swelling  Neck: supple  CV: normal S1/S2 with no murmur rubs or gallops  Lungs: positive air movement b/l ant lungs,clear to auscultation, no wheezes, no rales  Abdomen: soft non-tender non-distended, no hepatosplenomegaly  Ext: no clubbing cyanosis or edema  Skin: no rashes and no petechiae  Neuro: alert and oriented X 4, no focal deficits      LABS:                          13.1   9.97  )-----------( 351      ( 26 Oct 2024 06:12 )             38.9         Mean Cell Volume : 76.7 fL  Mean Cell Hemoglobin : 25.8 pg  Mean Cell Hemoglobin Concentration : 33.7 gm/dL  Auto Neutrophil # : 9.06 K/uL  Auto Lymphocyte # : 0.59 K/uL  Auto Monocyte # : 0.17 K/uL  Auto Eosinophil # : 0.00 K/uL  Auto Basophil # : 0.01 K/uL  Auto Neutrophil % : 90.9 %  Auto Lymphocyte % : 5.9 %  Auto Monocyte % : 1.7 %  Auto Eosinophil % : 0.0 %  Auto Basophil % : 0.1 %      10-26    139  |  100  |  6[L]  ----------------------------<  233[H]  3.6   |  22  |  0.41[L]    Ca    9.0      26 Oct 2024 06:12    TPro  7.5  /  Alb  3.6  /  TBili  0.4  /  DBili  x   /  AST  15  /  ALT  11  /  AlkPhos  84  10-26      PT/INR - ( 25 Oct 2024 13:35 )   PT: 12.1 sec;   INR: 1.04 ratio         PTT - ( 25 Oct 2024 13:35 )  PTT:28.3 sec                BLOOD SMEAR INTERPRETATION:       RADIOLOGY & ADDITIONAL STUDIES:    < from: CT Neck Soft Tissue w/ IV Cont (10.25.24 @ 15:33) >  1.  There is a 5.0 x 3.4 x 3.0 cm mass in the region of the left palatine   tonsil, suspicious for malignancy. The mass extends into the soft palate.   The mass abuts the contralateral palatine tonsil and markedly narrows the   oropharynx. The mass abuts the base of tongue and lingual surface of the   epiglottis.  2.  Enlarged, centrally necrotic left level 2/3 jayla mass measuring 5.7   x 4.1 cm. No enhancement in the portion of the left internal jugular vein   abutting the mass, suggesting severe compression/occlusion. The left   internal jugular vein is patent more proximally and distally.  3.  Numerous additional enlarged and/or abnormal appearing bilateral   level I-V cervical lymph nodes. Multiple enlarged bilateral subpectoral   and axillary lymph nodes, right greater than left. Findings suggest   metastatic disease.    < end of copied text >  < from: CT Abdomen and Pelvis w/ IV Cont (10.25.24 @ 15:33) >  Left supraclavicular and bilateral axillary lymphadenopathy. Slightly   prominent lymph nodes in the abdomen and pelvis. No splenomegaly.   Malignancy is possible.    < end of copied text >     HEMATOLOGY ONCOLOGY CONSULT     Patient is a 58y old  Female who presents with a chief complaint of hoarse voice, L neck swelling (26 Oct 2024 00:03)      HPI:  58F with no significant PMHx presenting with hoarse voice, L neck swelling and dysphagia x1 month. The patient does not follow with physicians routinely and is not on any meds for any medical problems. About a month ago she noticed dysphagia to solids and L neck swelling. She is able to swallow liquids fine. She denies fevers, chills, weight loss, CP, abdominal pain, vomiting, diarrhea,  complaints, LE edema. Notes some mild SOB but currently without. She bit her tongue 3 weeks ago on the right lateral side and since noticed an ulceration that has not resolved. No prior hx malignancy and denies smoking.    Seen by ENT in ED   CT scan with 5.0 x 3.4 x 3.0 cm mass in the region of the left palatine tonsil abutting BOT and lingual surface of epiglottis with bilateral cervical LAD, suspicious for malignancy. Exam significant for large L tonsillar mass, R lateral tongue lesion, and left levels II-IV LAD, and scope shows airway is widely patent. Pt now s/p bedside bx of R lateral tongue and L tonsillar mass.    (26 Oct 2024 00:03)     Oncology consulted for new tonsillar mass with LIJ compression.  On interview pt states she is upset that she was told by someone that it could be cancer without definitive pathology confirming this. She denies fevers, night sweats, or chills.  We discussed that we will wait for pathology and followup.     ROS:  Negative except for:    PAST MEDICAL & SURGICAL HISTORY:  Tonsillar mass      Tongue lesion      S/P appendectomy          SOCIAL HISTORY:    FAMILY HISTORY:  No pertinent family history in first degree relatives        MEDICATIONS  (STANDING):  amLODIPine   Tablet 2.5 milliGRAM(s) Oral daily  dexAMETHasone  IVPB 10 milliGRAM(s) IV Intermittent every 8 hours    MEDICATIONS  (PRN):  acetaminophen     Tablet .. 650 milliGRAM(s) Oral every 6 hours PRN Temp greater or equal to 38C (100.4F), Mild Pain (1 - 3)  melatonin 3 milliGRAM(s) Oral at bedtime PRN Insomnia      Allergies    No Known Allergies    Intolerances        Vital Signs Last 24 Hrs  T(C): 36.4 (26 Oct 2024 05:21), Max: 37.1 (25 Oct 2024 17:11)  T(F): 97.5 (26 Oct 2024 05:21), Max: 98.7 (25 Oct 2024 17:11)  HR: 90 (26 Oct 2024 05:21) (70 - 126)  BP: 160/90 (26 Oct 2024 06:34) (143/85 - 195/116)  BP(mean): 110 (25 Oct 2024 21:26) (110 - 110)  RR: 18 (26 Oct 2024 05:21) (16 - 20)  SpO2: 100% (26 Oct 2024 05:21) (95% - 100%)    Parameters below as of 26 Oct 2024 05:21  Patient On (Oxygen Delivery Method): room air        PHYSICAL EXAM  General: adult in NAD  HEENT: hoarse voice, neck swelling  Neck: supple  CV: normal S1/S2 with no murmur rubs or gallops  Lungs: positive air movement b/l ant lungs,clear to auscultation, no wheezes, no rales  Abdomen: soft non-tender non-distended, no hepatosplenomegaly  Ext: no clubbing cyanosis or edema  Skin: no rashes and no petechiae  Neuro: alert and oriented X 4, no focal deficits      LABS:                          13.1   9.97  )-----------( 351      ( 26 Oct 2024 06:12 )             38.9         Mean Cell Volume : 76.7 fL  Mean Cell Hemoglobin : 25.8 pg  Mean Cell Hemoglobin Concentration : 33.7 gm/dL  Auto Neutrophil # : 9.06 K/uL  Auto Lymphocyte # : 0.59 K/uL  Auto Monocyte # : 0.17 K/uL  Auto Eosinophil # : 0.00 K/uL  Auto Basophil # : 0.01 K/uL  Auto Neutrophil % : 90.9 %  Auto Lymphocyte % : 5.9 %  Auto Monocyte % : 1.7 %  Auto Eosinophil % : 0.0 %  Auto Basophil % : 0.1 %      10-26    139  |  100  |  6[L]  ----------------------------<  233[H]  3.6   |  22  |  0.41[L]    Ca    9.0      26 Oct 2024 06:12    TPro  7.5  /  Alb  3.6  /  TBili  0.4  /  DBili  x   /  AST  15  /  ALT  11  /  AlkPhos  84  10-26      PT/INR - ( 25 Oct 2024 13:35 )   PT: 12.1 sec;   INR: 1.04 ratio         PTT - ( 25 Oct 2024 13:35 )  PTT:28.3 sec                BLOOD SMEAR INTERPRETATION:       RADIOLOGY & ADDITIONAL STUDIES:    < from: CT Neck Soft Tissue w/ IV Cont (10.25.24 @ 15:33) >  1.  There is a 5.0 x 3.4 x 3.0 cm mass in the region of the left palatine   tonsil, suspicious for malignancy. The mass extends into the soft palate.   The mass abuts the contralateral palatine tonsil and markedly narrows the   oropharynx. The mass abuts the base of tongue and lingual surface of the   epiglottis.  2.  Enlarged, centrally necrotic left level 2/3 jayla mass measuring 5.7   x 4.1 cm. No enhancement in the portion of the left internal jugular vein   abutting the mass, suggesting severe compression/occlusion. The left   internal jugular vein is patent more proximally and distally.  3.  Numerous additional enlarged and/or abnormal appearing bilateral   level I-V cervical lymph nodes. Multiple enlarged bilateral subpectoral   and axillary lymph nodes, right greater than left. Findings suggest   metastatic disease.    < end of copied text >  < from: CT Abdomen and Pelvis w/ IV Cont (10.25.24 @ 15:33) >  Left supraclavicular and bilateral axillary lymphadenopathy. Slightly   prominent lymph nodes in the abdomen and pelvis. No splenomegaly.   Malignancy is possible.    < end of copied text >

## 2024-10-26 NOTE — H&P ADULT - NSHPLABSRESULTS_GEN_ALL_CORE
Personally reviewed labs:                        13.3   9.17  )-----------( 384      ( 25 Oct 2024 13:35 )             40.3     10-25-24 @ 17:13    140  |  102  |  6[L]             --------------------------< 190[H]     3.1[L]  |  25  | 0.40[L]    eGFR AA: --  eGFR N-AA: --    Calcium: 8.7  Phosphorus: --  Magnesium: --    AST: --    ALT: --  AlkPhos: --  Protein: --  Albumin: --  TBili: --  D-Bili: --  10-25-24 @ 13:35    141  |  98  |  9             --------------------------< 218[H]     3.3[L]  |  25  | 0.50    eGFR AA: --  eGFR N-AA: --    Calcium: 9.6  Phosphorus: --  Magnesium: --    AST: 16    ALT: 11  AlkPhos: 91  Protein: 8.1  Albumin: 3.9  TBili: 0.4  D-Bili: --        RADIOLOGY & ADDITIONAL TESTS:    EKG my independent interpretation: sinus tachycardia @ 118bpm, no STD or LISA, left axis deviation        Imaging personally reviewed:  CT chest:  IMPRESSION:  Left supraclavicular and bilateral axillary lymphadenopathy. Slightly   prominent lymph nodes in the abdomen and pelvis. No splenomegaly.   Malignancy is possible.    CT neck:  IMPRESSION:  1.  There is a 5.0 x 3.4 x 3.0 cm mass in the region of the left palatine   tonsil, suspicious for malignancy. The mass extends into the soft palate.   The mass abuts the contralateral palatine tonsil and markedly narrows the   oropharynx. The mass abuts the base of tongue and lingual surface of the   epiglottis.  2.  Enlarged, centrally necrotic left level 2/3 jayla mass measuring 5.7   x 4.1 cm. No enhancement in the portion of the left internal jugular vein   abutting the mass, suggesting severe compression/occlusion. The left   internal jugular vein is patent more proximally and distally.  3.  Numerous additional enlarged and/or abnormal appearing bilateral   level I-V cervical lymph nodes. Multiple enlarged bilateral subpectoral   and axillary lymph nodes, right greater than left. Findings suggest   metastatic disease.

## 2024-10-26 NOTE — H&P ADULT - NSHPREVIEWOFSYSTEMS_GEN_ALL_CORE
ROS:    Constitutional: [ ] fevers [ ] chills  HEENT: [ ] postnasal drip [ ] nasal congestion [x] hoarse voice, L neck swelling  CV: [ ] chest pain [ ] orthopnea [ ] palpitations  Resp: [ ] cough [ ] shortness of breath [ ] dyspnea [ ] wheezing  GI: [ ] nausea [ ] vomiting [ ] diarrhea [ ] constipation [ ] abd pain [ x] dysphagia   : [ ] dysuria [ ] nocturia [ ] hematuria [ ] increased urinary frequency  Musculoskeletal: [ ] back pain [ ] myalgias [ ] arthralgias [ ] fracture  Skin: [ ] rash [ ] itch  Neurological: [ ] headache [ ] dizziness [ ] syncope   Endocrine: [ ] diabetes [ ] thyroid problem  Hematologic/Lymphatic: [ ] anemia [ ] bleeding problem  [x ] All other systems negative

## 2024-10-26 NOTE — H&P ADULT - ASSESSMENT
58F with no significant PMHx presenting with hoarse voice, L neck swelling and dysphagia x1 month. Admitted for w/u of L tonsillar mass and R tongue lesion s/p biopsy with ENT

## 2024-10-26 NOTE — H&P ADULT - PROBLEM SELECTOR PLAN 1
Seen by ENT in ED   CT scan with 5.0 x 3.4 x 3.0 cm mass in the region of the left palatine tonsil abutting BOT and lingual surface of epiglottis with bilateral cervical LAD, suspicious for malignancy. Exam significant for large L tonsillar mass, R lateral tongue lesion, and left levels II-IV LAD, and scope shows airway is widely patent. Pt now s/p bedside bx of R lateral tongue and L tonsillar mass.   -dysphagia screen - if passed puree diet  -SLP eval  -f/u path   -discussed all findings with patient and her daughter. Discussed strong likelihood of cancer with metastasis given lymph node. Aware that she will need f/u with ENT and oncology upon discharge pending results  -onc c/s emailed  -noted mild SOB earlier but this has resolved. No tachycardia and no CP. If becomes SOB or has CP can consider CTA to r/o PE but overall low suspicion at this time. No signs of DVT on exam

## 2024-10-26 NOTE — CONSULT NOTE ADULT - ATTENDING COMMENTS
pt with tonsillar Mass with b/l LN and necrotic jayla mass with left IJ vein compression. also with additonal jayla lesions in axilla and neck.   - ddx oropharyngeal cancer vs lymphoma vs other  - s/p CT C/A/P for staging showing lymphadenopathy in axillary and cervical regions  - s/p biopsy with ENT. Will await path dx  - Consider sampling axillary lymph node to assist with staging if pathology concerning for head and neck SCC rather than lymphoma  - Please send HIV, acute hepatitis panel, EBV, LDH, uric acid, d-dimer, fibrinogen  - Appreciate further ENT recs  - airway monitoring  - May need inpatient treatment given extent of disease.

## 2024-10-26 NOTE — PATIENT PROFILE ADULT - ARRIVAL FROM
Airway  Urgency: elective    Date/Time: 4/10/2023 9:47 AM  End Time:4/10/2023 9:47 AM  Airway not difficult    General Information and Staff    Patient location during procedure: OR  Anesthesiologist: Zheng Venegas MD  CRNA/CAA: Marline Wei CAA    Indications and Patient Condition  Indications for airway management: airway protection    Preoxygenated: yes  Mask difficulty assessment: 1 - vent by mask    Final Airway Details  Final airway type: endotracheal airway      Successful airway: ETT  Cuffed: yes   Successful intubation technique: direct laryngoscopy  Facilitating devices/methods: intubating stylet  Endotracheal tube insertion site: oral  Blade: Maurisio  Blade size: 3  ETT size (mm): 7.0  Cormack-Lehane Classification: grade I - full view of glottis  Placement verified by: chest auscultation, capnometry and palpation of cuff   Measured from: lips  ETT/EBT  to lips (cm): 21  Number of attempts at approach: 1  Assessment: lips, teeth, and gum same as pre-op and atraumatic intubation           Home

## 2024-10-26 NOTE — H&P ADULT - HISTORY OF PRESENT ILLNESS
58F with no significant PMHx 58F with no significant PMHx presenting with hoarse voice, L neck swelling and dysphagia x1 month. The patient does not follow with physicians routinely and is not on any meds for any medical problems. About a month ago she noticed dysphagia to solids and L neck swelling. She is able to swallow liquids fine. She denies fevers, chills, weight loss, CP, abdominal pain, vomiting, diarrhea,  complaints, LE edema. Notes some mild SOB but currently without. She bit her tongue 3 weeks ago on the right lateral side and since noticed an ulceration that has not resolved. No prior hx malignancy and denies smoking.    Seen by ENT in ED   CT scan with 5.0 x 3.4 x 3.0 cm mass in the region of the left palatine tonsil abutting BOT and lingual surface of epiglottis with bilateral cervical LAD, suspicious for malignancy. Exam significant for large L tonsillar mass, R lateral tongue lesion, and left levels II-IV LAD, and scope shows airway is widely patent. Pt now s/p bedside bx of R lateral tongue and L tonsillar mass.

## 2024-10-26 NOTE — PATIENT PROFILE ADULT - PRO INTERPRETER NEED 2
After speaking to Dr Salazar, she advised to just monitor for now if stools are soft. Contacted mom and informed.    English

## 2024-10-26 NOTE — H&P ADULT - NSHPPHYSICALEXAM_GEN_ALL_CORE
PHYSICAL EXAM:  Vital Signs Last 24 Hrs  T(C): 36.9 (10-25-24 @ 21:26)  T(F): 98.5 (10-25-24 @ 21:26), Max: 98.7 (10-25-24 @ 17:11)  HR: 83 (10-25-24 @ 21:26) (70 - 126)  BP: 162/86 (10-25-24 @ 21:26)  BP(mean): 110 (10-25-24 @ 21:26) (110 - 110)  RR: 18 (10-25-24 @ 21:26) (16 - 20)  SpO2: 100% (10-25-24 @ 21:26) (96% - 100%)  Wt(kg): --    Constitutional: NAD, awake and alert, well developed  EYES: EOMI, conjunctiva clear  ENT:  Normal Hearing, no tonsillar exudates. L neck swelling. Right lateral tongue lesion  Neck: Soft and supple , no thyromegaly   Respiratory: Breath sounds are clear bilaterally, No wheezing, rales or rhonchi, no tachypnea, no accessory muscle use  Cardiovascular: S1 and S2, regular rate and rhythm, no Murmurs, gallops or rubs, no JVD, no leg edema  Gastrointestinal: Bowel Sounds present, soft, nontender, nondistended, no guarding, no rebound  Extremities: No cyanosis or clubbing; warm to touch  Vascular: 2+ peripheral pulses lower ex  Neurological: No focal deficits, CN II-XII intact bilaterally, sensation to light touch intact in all extremities.  Musculoskeletal: 5/5 strength b/l upper and lower extremities; no joint swelling.  Skin: No rashes, no ulcerations

## 2024-10-27 LAB
ALBUMIN SERPL ELPH-MCNC: 3.6 G/DL — SIGNIFICANT CHANGE UP (ref 3.3–5)
ALP SERPL-CCNC: 85 U/L — SIGNIFICANT CHANGE UP (ref 40–120)
ALT FLD-CCNC: 12 U/L — SIGNIFICANT CHANGE UP (ref 4–33)
ANION GAP SERPL CALC-SCNC: 13 MMOL/L — SIGNIFICANT CHANGE UP (ref 7–14)
AST SERPL-CCNC: 13 U/L — SIGNIFICANT CHANGE UP (ref 4–32)
BASOPHILS # BLD AUTO: 0.03 K/UL — SIGNIFICANT CHANGE UP (ref 0–0.2)
BASOPHILS NFR BLD AUTO: 0.2 % — SIGNIFICANT CHANGE UP (ref 0–2)
BILIRUB DIRECT SERPL-MCNC: <0.2 MG/DL — SIGNIFICANT CHANGE UP (ref 0–0.3)
BILIRUB INDIRECT FLD-MCNC: >0.1 MG/DL — SIGNIFICANT CHANGE UP (ref 0–1)
BILIRUB SERPL-MCNC: 0.3 MG/DL — SIGNIFICANT CHANGE UP (ref 0.2–1.2)
BILIRUB SERPL-MCNC: 0.3 MG/DL — SIGNIFICANT CHANGE UP (ref 0.2–1.2)
BUN SERPL-MCNC: 12 MG/DL — SIGNIFICANT CHANGE UP (ref 7–23)
CALCIUM SERPL-MCNC: 9.6 MG/DL — SIGNIFICANT CHANGE UP (ref 8.4–10.5)
CHLORIDE SERPL-SCNC: 100 MMOL/L — SIGNIFICANT CHANGE UP (ref 98–107)
CO2 SERPL-SCNC: 25 MMOL/L — SIGNIFICANT CHANGE UP (ref 22–31)
CREAT SERPL-MCNC: 0.49 MG/DL — LOW (ref 0.5–1.3)
D DIMER BLD IA.RAPID-MCNC: <150 NG/ML DDU — SIGNIFICANT CHANGE UP
EGFR: 110 ML/MIN/1.73M2 — SIGNIFICANT CHANGE UP
EOSINOPHIL # BLD AUTO: 0.01 K/UL — SIGNIFICANT CHANGE UP (ref 0–0.5)
EOSINOPHIL NFR BLD AUTO: 0.1 % — SIGNIFICANT CHANGE UP (ref 0–6)
FIBRINOGEN PPP-MCNC: 466 MG/DL — HIGH (ref 200–465)
GLUCOSE SERPL-MCNC: 210 MG/DL — HIGH (ref 70–99)
HAV IGM SER-ACNC: SIGNIFICANT CHANGE UP
HBV CORE IGM SER-ACNC: SIGNIFICANT CHANGE UP
HBV SURFACE AG SER-ACNC: SIGNIFICANT CHANGE UP
HCT VFR BLD CALC: 38.4 % — SIGNIFICANT CHANGE UP (ref 34.5–45)
HCV AB S/CO SERPL IA: 0.12 S/CO — SIGNIFICANT CHANGE UP (ref 0–0.99)
HCV AB SERPL-IMP: SIGNIFICANT CHANGE UP
HGB BLD-MCNC: 13.1 G/DL — SIGNIFICANT CHANGE UP (ref 11.5–15.5)
HIV 1+2 AB+HIV1 P24 AG SERPL QL IA: SIGNIFICANT CHANGE UP
IANC: 10.39 K/UL — HIGH (ref 1.8–7.4)
IMM GRANULOCYTES NFR BLD AUTO: 0.5 % — SIGNIFICANT CHANGE UP (ref 0–0.9)
LDH SERPL L TO P-CCNC: 390 U/L — HIGH (ref 135–225)
LYMPHOCYTES # BLD AUTO: 1.27 K/UL — SIGNIFICANT CHANGE UP (ref 1–3.3)
LYMPHOCYTES # BLD AUTO: 10.1 % — LOW (ref 13–44)
MCHC RBC-ENTMCNC: 25.8 PG — LOW (ref 27–34)
MCHC RBC-ENTMCNC: 34.1 GM/DL — SIGNIFICANT CHANGE UP (ref 32–36)
MCV RBC AUTO: 75.6 FL — LOW (ref 80–100)
MONOCYTES # BLD AUTO: 0.81 K/UL — SIGNIFICANT CHANGE UP (ref 0–0.9)
MONOCYTES NFR BLD AUTO: 6.4 % — SIGNIFICANT CHANGE UP (ref 2–14)
NEUTROPHILS # BLD AUTO: 10.39 K/UL — HIGH (ref 1.8–7.4)
NEUTROPHILS NFR BLD AUTO: 82.7 % — HIGH (ref 43–77)
NRBC # BLD: 0 /100 WBCS — SIGNIFICANT CHANGE UP (ref 0–0)
NRBC # FLD: 0 K/UL — SIGNIFICANT CHANGE UP (ref 0–0)
PLATELET # BLD AUTO: 393 K/UL — SIGNIFICANT CHANGE UP (ref 150–400)
POTASSIUM SERPL-MCNC: 3.4 MMOL/L — LOW (ref 3.5–5.3)
POTASSIUM SERPL-SCNC: 3.4 MMOL/L — LOW (ref 3.5–5.3)
PROT SERPL-MCNC: 7.4 G/DL — SIGNIFICANT CHANGE UP (ref 6–8.3)
RBC # BLD: 5.08 M/UL — SIGNIFICANT CHANGE UP (ref 3.8–5.2)
RBC # FLD: 13.2 % — SIGNIFICANT CHANGE UP (ref 10.3–14.5)
SODIUM SERPL-SCNC: 138 MMOL/L — SIGNIFICANT CHANGE UP (ref 135–145)
URATE SERPL-MCNC: 5.3 MG/DL — SIGNIFICANT CHANGE UP (ref 2.5–7)
WBC # BLD: 12.57 K/UL — HIGH (ref 3.8–10.5)
WBC # FLD AUTO: 12.57 K/UL — HIGH (ref 3.8–10.5)

## 2024-10-27 PROCEDURE — 99233 SBSQ HOSP IP/OBS HIGH 50: CPT

## 2024-10-27 RX ORDER — POTASSIUM CHLORIDE 10 MEQ
40 TABLET, EXTENDED RELEASE ORAL ONCE
Refills: 0 | Status: COMPLETED | OUTPATIENT
Start: 2024-10-27 | End: 2024-10-27

## 2024-10-27 RX ORDER — AMLODIPINE BESYLATE 10 MG
5 TABLET ORAL DAILY
Refills: 0 | Status: DISCONTINUED | OUTPATIENT
Start: 2024-10-28 | End: 2024-10-29

## 2024-10-27 RX ADMIN — Medication 2.5 MILLIGRAM(S): at 05:32

## 2024-10-27 RX ADMIN — Medication 40 MILLIEQUIVALENT(S): at 08:14

## 2024-10-27 RX ADMIN — Medication 3 MILLIGRAM(S): at 01:13

## 2024-10-27 NOTE — DISCHARGE NOTE PROVIDER - ATTENDING DISCHARGE PHYSICAL EXAMINATION:
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

## 2024-10-27 NOTE — DISCHARGE NOTE PROVIDER - CARE PROVIDER_API CALL
Head and Neck Surgeon,   Phone: [phone]  Fax: (   )    -  Follow Up Time:    Head and Neck Surgeon,   Phone: [phone]  Fax: (   )    -  Follow Up Time:     Your, PCP  Phone: (   )    -  Fax: (   )    -  Follow Up Time: 2 weeks   Head and Neck Surgeon,   Phone: [phone]  Fax: (   )    -  Follow Up Time:     Your, PCP  Phone: (   )    -  Fax: (   )    -  Follow Up Time: 2 weeks    Zane Bass  Otolaryngology  [address] [place] [id number]  Phone: [phone]  Fax: [phone]  Follow Up Time: 1 week

## 2024-10-27 NOTE — DISCHARGE NOTE PROVIDER - HOSPITAL COURSE
HPI:  58F with no significant PMHx presenting with hoarse voice, L neck swelling and dysphagia x1 month. The patient does not follow with physicians routinely and is not on any meds for any medical problems. About a month ago she noticed dysphagia to solids and L neck swelling. She is able to swallow liquids fine. She denies fevers, chills, weight loss, CP, abdominal pain, vomiting, diarrhea,  complaints, LE edema. Notes some mild SOB but currently without. She bit her tongue 3 weeks ago on the right lateral side and since noticed an ulceration that has not resolved. No prior hx malignancy and denies smoking.    Seen by ENT in ED   CT scan with 5.0 x 3.4 x 3.0 cm mass in the region of the left palatine tonsil abutting BOT and lingual surface of epiglottis with bilateral cervical LAD, suspicious for malignancy. Exam significant for large L tonsillar mass, R lateral tongue lesion, and left levels II-IV LAD, and scope shows airway is widely patent. Pt now s/p bedside bx of R lateral tongue and L tonsillar mass.    (26 Oct 2024 00:03)    Hospital Course:  Admitted for large mass in the region of the left palatine tonsil, suspicious for malignancy. CT scan with 5.0 x 3.4 x 3.0 cm mass in the region of the left palatine tonsil abutting BOT and lingual surface of epiglottis with bilateral cervical LAD, suspicious for malignancy. s/p bedside bx of R lateral tongue and L tonsillar mass. Treated with dexamethasone x3 after bx. Now pending speech and swallow evaluation for reported dysphagia. Pt also found to have new diagnosis of HTN, started on amlodipine.    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 The patient does not follow with physicians routinely and is not on any meds for any medical problems. About a month ago she noticed dysphagia to solids and L neck swelling. She is able to swallow liquids fine. She denies fevers, chills, weight loss, CP, abdominal pain, vomiting, diarrhea,  complaints, LE edema. Notes some mild SOB but currently without. She bit her tongue 3 weeks ago on the right lateral side and since noticed an ulceration that has not resolved. No prior hx malignancy and denies smoking.    Seen by ENT in ED   CT scan with 5.0 x 3.4 x 3.0 cm mass in the region of the left palatine tonsil abutting BOT and lingual surface of epiglottis with bilateral cervical LAD, suspicious for malignancy. Exam significant for large L tonsillar mass, R lateral tongue lesion, and left levels II-IV LAD, and scope shows airway is widely patent. Pt now s/p bedside bx of R lateral tongue and L tonsillar mass.       Hospital Course:  Admitted for large mass in the region of the left palatine tonsil, suspicious for malignancy. CT scan with 5.0 x 3.4 x 3.0 cm mass in the region of the left palatine tonsil abutting BOT and lingual surface of epiglottis with bilateral cervical LAD, suspicious for malignancy. s/p bedside bx of R lateral tongue and L tonsillar mass. Treated with dexamethasone x3 after bx. Now pending speech and swallow evaluation for reported dysphagia. Pt also found to have new diagnosis of HTN, started on amlodipine.    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

## 2024-10-27 NOTE — DISCHARGE NOTE PROVIDER - NSDCCPCAREPLAN_GEN_ALL_CORE_FT
PRINCIPAL DISCHARGE DIAGNOSIS  Diagnosis: Tonsillar mass  Assessment and Plan of Treatment: You had a mass biopsied on your tonsil. Please follow up with our oncologist for results of this biopsy and further treatment options.     PRINCIPAL DISCHARGE DIAGNOSIS  Diagnosis: Tonsillar mass  Assessment and Plan of Treatment: You had a mass biopsied on your tonsil. Please follow up with our oncologist for results of this biopsy and further treatment options. You will need follow up with the head and neck surgeon as well . Eat soft foods at home; eat slowly, take small bites; wait until you swallow one bite before you take the next.  If you are feeling and restriction of your breathing come to the emergency room immediately

## 2024-10-27 NOTE — PROGRESS NOTE ADULT - TIME BILLING
Time-based billing (NON-critical care).     More than 50% of the visit was spent counseling and / or coordinating care by the attending physician.      The necessity of the time spent during the encounter on this date of service was due to: documentation in [place], reviewing chart and coordinating care with patient/ACPs and interdisciplinary staff (such as , social workers, etc) as well as reviewing vitals, laboratory data, radiology, medication list, consultants' recommendations and prior records. Interventions were performed as documented above.

## 2024-10-27 NOTE — SWALLOW BEDSIDE ASSESSMENT ADULT - ADDITIONAL RECOMMENDATIONS
Medical team advised to reconsult this service if there is change in patient's medical status and/or tolerance of recommended PO diet. This service to follow as schedule permits to assess diet tolerance.

## 2024-10-27 NOTE — SWALLOW BEDSIDE ASSESSMENT ADULT - ASR SWALLOW LINGUAL MOBILITY
R lateral tongue lesion (as identified in ENT consultation); Able to protrude, elevate/depress, and lateralize.

## 2024-10-27 NOTE — SWALLOW BEDSIDE ASSESSMENT ADULT - SWALLOW EVAL: DIAGNOSIS
1. Mild oral stage for soft and bite-size as characterized by adequate oral acceptance/containment, slow/prolonged mastication (with ability to break down the solid), adequate anterior-posterior transport, and reduced oral clearance with trace lingual surface and lateral sulci stasis post primary-swallow; Patient completed spontaneous bilateral lingual sweep and subsequent liquid wash with thin to clear. 2. Functional oral stage for puree and thin liquids as characterized by adequate oral acceptance/containment, adequate anterior-posterior transport, and adequate oral clearance. 3. Functional pharyngeal stage for soft and bite-size, puree, and thin liquids as characterized by suspected timely initiation of the pharyngeal swallow trigger and +hyolaryngeal excursion upon digital palpation. No overt s/sx aspiration/penetration evidenced & patient denied globus/stuck sensation with all trialed consistencies.

## 2024-10-27 NOTE — SWALLOW BEDSIDE ASSESSMENT ADULT - ORAL PHASE
Within functional limits Trace lingual surface and lateral sulci stasis post primary-swallow; Patient completed spontaneous bilateral lingual sweep and subsequent liquid wash with thin to clear.

## 2024-10-27 NOTE — SWALLOW BEDSIDE ASSESSMENT ADULT - ASR SWALLOW REFERRAL
RD consultation to ensure adequate nutrition - patient may benefit from additional PO supplements to maximize daily caloric intake; Consider GI consult to assess/rule-out esophageal dysphagia component given patient report, "I spit up a lot"; ENT following./Registered Dietitian/ENT

## 2024-10-27 NOTE — DISCHARGE NOTE PROVIDER - PROVIDER TOKENS
FREE:[LAST:[Head and Neck Surgeon],PHONE:[phone]],FAX:[(   )    -]] FREE:[LAST:[Head and Neck Surgeon],PHONE:[phone]],FAX:[(   )    -]],FREE:[LAST:[Your],FIRST:[PCP],PHONE:[(   )    -],FAX:[(   )    -],FOLLOWUP:[2 weeks]] FREE:[LAST:[Head and Neck Surgeon],PHONE:[phone]],FAX:[(   )    -]],FREE:[LAST:[Your],FIRST:[PCP],PHONE:[(   )    -],FAX:[(   )    -],FOLLOWUP:[2 weeks]],PROVIDER:[TOKEN:[[id number]],FOLLOWUP:[1 week]]

## 2024-10-27 NOTE — DISCHARGE NOTE PROVIDER - NSDCFUADDAPPT_GEN_ALL_CORE_FT
You should receive a call from the Cancer Center coordinator for a follow up appointment.  IF you don't hear from them in a week, please call [phone] to arrange a follow up appointment

## 2024-10-27 NOTE — SWALLOW BEDSIDE ASSESSMENT ADULT - COMMENTS
H&P 10/26: "58F with no significant PMHx presenting with hoarse voice, L neck swelling and dysphagia x1 month. Admitted for w/u of L tonsillar mass and R tongue lesion s/p biopsy with ENT"    ENT Consult 10/25: "58y Female w no known medical hx who presents to ED with ~3 weeks of L neck swelling, throat pain and dysphagia along with voice changes. Also reports that 3 weeks ago she bit her right lateral tongue and has since had an ulceration to the region that has not resolved. CT scan with 5.0 x 3.4 x 3.0 cm mass in the region of the left palatine tonsil abutting BOT and lingual surface of epiglottis with bilateral cervical LAD, suspicious for malignancy. Exam significant for large L tonsillar mass, R lateral tongue lesion, and left levels II-IV LAD, and scope shows airway is widely patent. Pt now s/p bedside bx of R lateral tongue and L tonsillar mass."    CT Chest 10/25: "IMPRESSION: Left supraclavicular and bilateral axillary lymphadenopathy. Slightly prominent lymph nodes in the abdomen and pelvis. No splenomegaly. Malignancy is possible. LUNGS AND LARGE AIRWAYS: Patent central airways. Mild dependent atelectasis. 4 mm likely calcified granuloma in the left upper lobe (303, 34). H&P 10/26: "58F with no significant PMHx presenting with hoarse voice, L neck swelling and dysphagia x1 month. Admitted for w/u of L tonsillar mass and R tongue lesion s/p biopsy with ENT"    ENT Consult 10/25: "58y Female w no known medical hx who presents to ED with ~3 weeks of L neck swelling, throat pain and dysphagia along with voice changes. Also reports that 3 weeks ago she bit her right lateral tongue and has since had an ulceration to the region that has not resolved. CT scan with 5.0 x 3.4 x 3.0 cm mass in the region of the left palatine tonsil abutting BOT and lingual surface of epiglottis with bilateral cervical LAD, suspicious for malignancy. Exam significant for large L tonsillar mass, R lateral tongue lesion, and left levels II-IV LAD, and scope shows airway is widely patent. Pt now s/p bedside bx of R lateral tongue and L tonsillar mass."    CT Chest 10/25: "IMPRESSION: Left supraclavicular and bilateral axillary lymphadenopathy. Slightly prominent lymph nodes in the abdomen and pelvis. No splenomegaly. Malignancy is possible. LUNGS AND LARGE AIRWAYS: Patent central airways. Mild dependent atelectasis. 4 mm likely calcified granuloma in the left upper lobe (303, 34).    Patient received awake/alert, upright on side of bed, and in no acute distress. Able to make wants/needs known and follow simple verbal directives. Endorsed recent onset of difficulty swallowing solids. Stated, "sometimes they get stuck here (patient pointed to mid-throat region)." Elaborated, "I spit up a lot." Reported drinking liquids to improve symptoms. Further reported that they previously, "bit my tongue." Stated that because of this, they were consuming, "mostly soups" prior to hospitalization. Stated, "I'm doing well with the puree." Agreeable to PO trials with SLP.

## 2024-10-27 NOTE — SWALLOW BEDSIDE ASSESSMENT ADULT - ASR SWALLOW RECOMMEND DIAG
2. Cinesophagram to objectively assess oropharyngeal swallow given patient report of solids intermittently, "getting stuck."/VFSS/MBS

## 2024-10-27 NOTE — DISCHARGE NOTE PROVIDER - NSDCFUSCHEDAPPT_GEN_ALL_CORE_FT
independent
Zane Bass  [facility]  [facility] [address] [facility]  Scheduled Appointment: 11/04/2024

## 2024-10-28 LAB
ALBUMIN SERPL ELPH-MCNC: 4 G/DL — SIGNIFICANT CHANGE UP (ref 3.3–5)
ALP SERPL-CCNC: 91 U/L — SIGNIFICANT CHANGE UP (ref 40–120)
ALT FLD-CCNC: 18 U/L — SIGNIFICANT CHANGE UP (ref 4–33)
ANION GAP SERPL CALC-SCNC: 15 MMOL/L — HIGH (ref 7–14)
AST SERPL-CCNC: 20 U/L — SIGNIFICANT CHANGE UP (ref 4–32)
BASOPHILS # BLD AUTO: 0.04 K/UL — SIGNIFICANT CHANGE UP (ref 0–0.2)
BASOPHILS NFR BLD AUTO: 0.4 % — SIGNIFICANT CHANGE UP (ref 0–2)
BILIRUB SERPL-MCNC: 0.4 MG/DL — SIGNIFICANT CHANGE UP (ref 0.2–1.2)
BUN SERPL-MCNC: 12 MG/DL — SIGNIFICANT CHANGE UP (ref 7–23)
CALCIUM SERPL-MCNC: 9.5 MG/DL — SIGNIFICANT CHANGE UP (ref 8.4–10.5)
CHLORIDE SERPL-SCNC: 97 MMOL/L — LOW (ref 98–107)
CO2 SERPL-SCNC: 25 MMOL/L — SIGNIFICANT CHANGE UP (ref 22–31)
CREAT SERPL-MCNC: 0.59 MG/DL — SIGNIFICANT CHANGE UP (ref 0.5–1.3)
EBV EA AB SER IA-ACNC: >150 U/ML — HIGH
EBV EA AB TITR SER IF: POSITIVE
EBV EA IGG SER-ACNC: POSITIVE
EBV NA IGG SER IA-ACNC: >600 U/ML — HIGH
EBV PATRN SPEC IB-IMP: SIGNIFICANT CHANGE UP
EBV VCA IGG AVIDITY SER QL IA: POSITIVE
EBV VCA IGM SER IA-ACNC: <10 U/ML — SIGNIFICANT CHANGE UP
EBV VCA IGM SER IA-ACNC: >750 U/ML — HIGH
EBV VCA IGM TITR FLD: NEGATIVE — SIGNIFICANT CHANGE UP
EGFR: 105 ML/MIN/1.73M2 — SIGNIFICANT CHANGE UP
EOSINOPHIL # BLD AUTO: 0.18 K/UL — SIGNIFICANT CHANGE UP (ref 0–0.5)
EOSINOPHIL NFR BLD AUTO: 1.8 % — SIGNIFICANT CHANGE UP (ref 0–6)
GLUCOSE SERPL-MCNC: 206 MG/DL — HIGH (ref 70–99)
HCT VFR BLD CALC: 43.2 % — SIGNIFICANT CHANGE UP (ref 34.5–45)
HGB BLD-MCNC: 14.6 G/DL — SIGNIFICANT CHANGE UP (ref 11.5–15.5)
IANC: 7 K/UL — SIGNIFICANT CHANGE UP (ref 1.8–7.4)
IMM GRANULOCYTES NFR BLD AUTO: 0.6 % — SIGNIFICANT CHANGE UP (ref 0–0.9)
LYMPHOCYTES # BLD AUTO: 1.79 K/UL — SIGNIFICANT CHANGE UP (ref 1–3.3)
LYMPHOCYTES # BLD AUTO: 18.2 % — SIGNIFICANT CHANGE UP (ref 13–44)
MCHC RBC-ENTMCNC: 25.7 PG — LOW (ref 27–34)
MCHC RBC-ENTMCNC: 33.8 GM/DL — SIGNIFICANT CHANGE UP (ref 32–36)
MCV RBC AUTO: 75.9 FL — LOW (ref 80–100)
MONOCYTES # BLD AUTO: 0.76 K/UL — SIGNIFICANT CHANGE UP (ref 0–0.9)
MONOCYTES NFR BLD AUTO: 7.7 % — SIGNIFICANT CHANGE UP (ref 2–14)
NEUTROPHILS # BLD AUTO: 7 K/UL — SIGNIFICANT CHANGE UP (ref 1.8–7.4)
NEUTROPHILS NFR BLD AUTO: 71.3 % — SIGNIFICANT CHANGE UP (ref 43–77)
NRBC # BLD: 0 /100 WBCS — SIGNIFICANT CHANGE UP (ref 0–0)
NRBC # FLD: 0 K/UL — SIGNIFICANT CHANGE UP (ref 0–0)
PLATELET # BLD AUTO: 397 K/UL — SIGNIFICANT CHANGE UP (ref 150–400)
POTASSIUM SERPL-MCNC: 3.3 MMOL/L — LOW (ref 3.5–5.3)
POTASSIUM SERPL-SCNC: 3.3 MMOL/L — LOW (ref 3.5–5.3)
PROT SERPL-MCNC: 7.9 G/DL — SIGNIFICANT CHANGE UP (ref 6–8.3)
RBC # BLD: 5.69 M/UL — HIGH (ref 3.8–5.2)
RBC # FLD: 13.3 % — SIGNIFICANT CHANGE UP (ref 10.3–14.5)
SODIUM SERPL-SCNC: 137 MMOL/L — SIGNIFICANT CHANGE UP (ref 135–145)
WBC # BLD: 9.83 K/UL — SIGNIFICANT CHANGE UP (ref 3.8–10.5)
WBC # FLD AUTO: 9.83 K/UL — SIGNIFICANT CHANGE UP (ref 3.8–10.5)

## 2024-10-28 PROCEDURE — 74230 X-RAY XM SWLNG FUNCJ C+: CPT | Mod: 26

## 2024-10-28 PROCEDURE — 99232 SBSQ HOSP IP/OBS MODERATE 35: CPT

## 2024-10-28 RX ORDER — POTASSIUM CHLORIDE 10 MEQ
40 TABLET, EXTENDED RELEASE ORAL EVERY 4 HOURS
Refills: 0 | Status: COMPLETED | OUTPATIENT
Start: 2024-10-28 | End: 2024-10-28

## 2024-10-28 RX ORDER — HYDRALAZINE HYDROCHLORIDE 50 MG/1
10 TABLET, FILM COATED ORAL ONCE
Refills: 0 | Status: COMPLETED | OUTPATIENT
Start: 2024-10-28 | End: 2024-10-28

## 2024-10-28 RX ADMIN — Medication 40 MILLIEQUIVALENT(S): at 15:25

## 2024-10-28 RX ADMIN — Medication 5 MILLIGRAM(S): at 05:51

## 2024-10-28 RX ADMIN — Medication 40 MILLIEQUIVALENT(S): at 11:00

## 2024-10-28 RX ADMIN — HYDRALAZINE HYDROCHLORIDE 10 MILLIGRAM(S): 50 TABLET, FILM COATED ORAL at 15:25

## 2024-10-28 NOTE — PROGRESS NOTE ADULT - PROBLEM SELECTOR PLAN 5
Given recent bx, hold off chemical dvt ppx. SCDs for now
Given recent bx, hold off chemical dvt ppx. SCDs for now

## 2024-10-28 NOTE — SWALLOW VFSS/MBS ASSESSMENT ADULT - DEMONSTRATES NEED FOR REFERRAL TO ANOTHER SERVICE
ENT follow up as clinically indicated; Consider GI consult given prior recommendation on bedside evaluation (10/27; see note)/ENT

## 2024-10-28 NOTE — PROGRESS NOTE ADULT - SUBJECTIVE AND OBJECTIVE BOX
Pankaj Parada MD  Division of Hospitalist Medicine  Pager [phone]  Available on Teams    CC: Patient is a 58y old  Female who presents with a chief complaint of hoarse voice, L neck swelling (26 Oct 2024 09:35)        SUBJECTIVE / INTERVAL HPI: Patient seen and examined at bedside. Pt grossly denies any acute complaints.     ROS: negative unless otherwise stated above.    VITAL SIGNS:  Vital Signs Last 24 Hrs  T(C): 36.6 (27 Oct 2024 10:44), Max: 36.6 (26 Oct 2024 14:13)  T(F): 97.8 (27 Oct 2024 10:44), Max: 97.9 (26 Oct 2024 14:13)  HR: 86 (27 Oct 2024 10:44) (76 - 103)  BP: 161/82 (27 Oct 2024 10:44) (146/89 - 179/89)  BP(mean): --  RR: 18 (27 Oct 2024 10:44) (16 - 18)  SpO2: 98% (27 Oct 2024 10:44) (98% - 100%)    Parameters below as of 27 Oct 2024 10:44  Patient On (Oxygen Delivery Method): room air            PHYSICAL EXAM:    General: NAD  HEENT: MMM  Neck: supple  Cardiovascular: +S1/S2; RRR  Respiratory: CTA B/L; no W/R/R  Gastrointestinal: soft, NT/ND  Extremities: WWP; no edema, clubbing or cyanosis  Neurological: awake and alert; communicating and able to follow commands without appreciated focal neurologic deficits    MEDICATIONS:  MEDICATIONS  (STANDING):  amLODIPine   Tablet 2.5 milliGRAM(s) Oral daily    MEDICATIONS  (PRN):  acetaminophen     Tablet .. 650 milliGRAM(s) Oral every 6 hours PRN Temp greater or equal to 38C (100.4F), Mild Pain (1 - 3)  melatonin 3 milliGRAM(s) Oral at bedtime PRN Insomnia      ALLERGIES:  Allergies    No Known Allergies    Intolerances        LABS:                        13.1   12.57 )-----------( 393      ( 27 Oct 2024 06:27 )             38.4     10-27    138  |  100  |  12  ----------------------------<  210[H]  3.4[L]   |  25  |  0.49[L]    Ca    9.6      27 Oct 2024 06:27    TPro  7.4  /  Alb  3.6  /  TBili  0.3  /  DBili  <0.2  /  AST  13  /  ALT  12  /  AlkPhos  85  10-27      Urinalysis Basic - ( 27 Oct 2024 06:27 )    Color: x / Appearance: x / SG: x / pH: x  Gluc: 210 mg/dL / Ketone: x  / Bili: x / Urobili: x   Blood: x / Protein: x / Nitrite: x   Leuk Esterase: x / RBC: x / WBC x   Sq Epi: x / Non Sq Epi: x / Bacteria: x      CAPILLARY BLOOD GLUCOSE          RADIOLOGY & ADDITIONAL TESTS: Reviewed.
[facility] Division of Hospital Medicine  Niru Cho MD  Pager [phone]    Patient is a 58y old  Female who presents with a chief complaint of hoarse voice, L neck swelling       SUBJECTIVE / OVERNIGHT EVENTS: pt eating breakfast upon my arrival; denies issues swallowing; voice muffled/nasal; denies pain    MEDICATIONS  (STANDING):  amLODIPine   Tablet 5 milliGRAM(s) Oral daily  potassium chloride   Powder 40 milliEquivalent(s) Oral every 4 hours    MEDICATIONS  (PRN):  acetaminophen     Tablet .. 650 milliGRAM(s) Oral every 6 hours PRN Temp greater or equal to 38C (100.4F), Mild Pain (1 - 3)  melatonin 3 milliGRAM(s) Oral at bedtime PRN Insomnia      PHYSICAL EXAM:  Vital Signs Last 24 Hrs  T(F): 98.2 (28 Oct 2024 05:54), Max: 98.2 (27 Oct 2024 14:13)  HR: 80 (28 Oct 2024 07:38) (79 - 100)  BP: 147/79 (28 Oct 2024 07:38) (147/79 - 176/83)  RR: 18 (28 Oct 2024 05:54) (18 - 18)  SpO2: 100% (28 Oct 2024 05:54) (97% - 100%)    Parameters below as of 28 Oct 2024 05:54  Patient On (Oxygen Delivery Method): room air        CONSTITUTIONAL: NAD, appears comfortable  EYES: PERRLA; conjunctiva and sclera clear  ENMT: Moist oral mucosa; normal dentition  NECK: R sided [place]  RESPIRATORY: Normal respiratory effort; lungs are clear to auscultation bilaterally  CARDIOVASCULAR: Regular rate and rhythm; No lower extremity edema  ABDOMEN: Nontender to palpation, normoactive bowel sounds  MUSCULOSKELETAL:  no clubbing or cyanosis of digits; no joint swelling or tenderness to palpation  PSYCH: A+O to person, place, and time; affect appropriate  NEUROLOGY: CN 2-12 are intact and symmetric; no gross sensory deficits   SKIN: No rashes; no palpable lesions    LABS:                        14.6   9.83  )-----------( 397      ( 28 Oct 2024 05:40 )             43.2     10-28    137  |  97[L]  |  12  ----------------------------<  206[H]  3.3[L]   |  25  |  0.59    Ca    9.5      28 Oct 2024 05:40    TPro  7.9  /  Alb  4.0  /  TBili  0.4  /  DBili  x   /  AST  20  /  ALT  18  /  AlkPhos  91  10-28

## 2024-10-28 NOTE — SWALLOW VFSS/MBS ASSESSMENT ADULT - COMMENTS
Progress Note- Hospitalist 10/28: "58F with no significant PMHx presenting with hoarse voice, L neck swelling and dysphagia x1 month. Admitted for w/u of L tonsillar mass and R tongue lesion s/p biopsy with ENT"    ENT Note 10/25: "58y Female w no known medical hx who presents to ED with ~3 weeks of L neck swelling, throat pain and dysphagia along with voice changes. Also reports that 3 weeks ago she bit her right lateral tongue and has since had an ulceration to the region that has not resolved. CT scan with 5.0 x 3.4 x 3.0 cm mass in the region of the left palatine tonsil abutting BOT and lingual surface of epiglottis with bilateral cervical LAD, suspicious for malignancy. Exam significant for large L tonsillar mass, R lateral tongue lesion, and left levels II-IV LAD, and scope shows airway is widely patent. Pt now s/p bedside bx of R lateral tongue and L tonsillar mass."    Of note, patient seen for a clinical swallow evaluation on 10/27 with recommendations of Soft and Bite Size Solids with Thin Liquids and a Cinesophagram (see note for details)     Patient received in Radiology Suite this AM for a Cinesophagram. Patient transferred to a specialized seating unit with lateral view projection.

## 2024-10-28 NOTE — PROGRESS NOTE ADULT - PROBLEM SELECTOR PLAN 1
ENT, onc recs appreciated  CT scan with 5.0 x 3.4 x 3.0 cm mass in the region of the left palatine tonsil abutting BOT and lingual surface of epiglottis with bilateral cervical LAD, suspicious for malignancy. Exam significant for large L tonsillar mass, R lateral tongue lesion, and left levels II-IV LAD, and scope shows airway is widely patent. Pt now s/p bedside bx of R lateral tongue and L tonsillar mass.   -SLP eval: pending cine/MBS  -f/u path   -discussed all findings with patient and her daughter. Discussed strong likelihood of cancer with metastasis given lymph node. Aware that she will need f/u with ENT and oncology upon discharge pending results
ENT, onc recs appreciated  CT scan with 5.0 x 3.4 x 3.0 cm mass in the region of the left palatine tonsil abutting BOT and lingual surface of epiglottis with bilateral cervical LAD, suspicious for malignancy. Exam significant for large L tonsillar mass, R lateral tongue lesion, and left levels II-IV LAD, and scope shows airway is widely patent. Pt now s/p bedside bx of R lateral tongue and L tonsillar mass.   -SLP eval: pending cine/MBS  -f/u path   will f/u with ENT, onc as outpt

## 2024-10-28 NOTE — SWALLOW VFSS/MBS ASSESSMENT ADULT - ADDITIONAL RECOMMENDATIONS
This department to follow up as schedule permits to assess for diet tolerance/advancement/swallow therapy. Patient will benefit swallow therapy pending discharge planning (e.g. Homecare vs. Rehab vs. [facility] [phone]). Medical team further advised to reconsult if there is a change in medical status and/or observed change in patient's tolerance of recommended PO diet.

## 2024-10-28 NOTE — PROGRESS NOTE ADULT - ASSESSMENT
58F with no significant PMHx presenting with hoarse voice, L neck swelling and dysphagia x1 month. Admitted for w/u of L tonsillar mass and R tongue lesion s/p biopsy with ENT
58F with no significant PMHx presenting with hoarse voice, L neck swelling and dysphagia x1 month. Admitted for w/u of L tonsillar mass and R tongue lesion s/p biopsy with ENT

## 2024-10-28 NOTE — SWALLOW VFSS/MBS ASSESSMENT ADULT - DIAGNOSTIC IMPRESSIONS
1. Mild oral stage for puree, regular solids, moderately thick liquids, mildly thick liquids, and thin liquids characterized by adequate oral containment, adequate bolus manipulation, adequate mastication for regular solids, adequate anterior to posterior transfer with piecemeal transfer/deglutition (2 swallows) for puree/regular solids, premature spillage to the hypopharynx (vallecular) due to reduced tongue to palate seal for thin liquids, and adequate oral clearance.   2. Mild Pharyngeal Stage for puree, regular solids, moderately thick liquids, and mildly thick liquids characterized by adequate initiation of the pharyngeal swallow, adequate laryngeal elevation, adequate laryngeal vestibule closure, reduced base of tongue retraction, adequate epiglottic deflection, and reduced pharyngeal contractility. There are trace/mild pharyngeal clearance deficits vallecular>pyriforms post primary swallow greater for puree/regular solids. Spontaneous reswallow partially assists with pharyngeal clearance. There is No Aspiration before, during, or after the swallow for puree, regular solids, moderately thick liquids, and mildly thick liquids.   3. Moderate pharyngeal stage for thin liquids characterized by delayed initiation of the pharyngeal swallow (Bolus head at the vallecular), reduced laryngeal elevation, reduced laryngeal vestibule closure, reduced base of tongue retraction, reduced epiglottic deflection, and reduced pharyngeal contractility. There are trace pharyngeal clearance deficits post primary swallow. There is laryngeal penetration during the swallow without retrieval leaving trace residue in the laryngeal vestibule migrating towards the level of the vocal folds. 1. Mild oral stage for puree, regular solids, moderately thick liquids, mildly thick liquids, and thin liquids characterized by adequate oral containment, adequate bolus manipulation, slow mastication for regular solids, adequate anterior to posterior transfer with piecemeal transfer/deglutition (2 swallows) for puree/regular solids, premature spillage to the hypopharynx (vallecular) due to reduced tongue to palate seal for thin liquids, and adequate oral clearance.   2. Mild Pharyngeal Stage for puree, regular solids, moderately thick liquids, and mildly thick liquids characterized by adequate initiation of the pharyngeal swallow, adequate laryngeal elevation, adequate laryngeal vestibule closure, reduced base of tongue retraction, adequate epiglottic deflection, and reduced pharyngeal contractility. There are trace/mild pharyngeal clearance deficits vallecular>pyriforms post primary swallow greater for puree/regular solids. Spontaneous reswallow partially assists with pharyngeal clearance. There is No Aspiration before, during, or after the swallow for puree, regular solids, moderately thick liquids, and mildly thick liquids.   3. Moderate pharyngeal stage for thin liquids characterized by delayed initiation of the pharyngeal swallow (Bolus head at the vallecular), reduced laryngeal elevation, reduced laryngeal vestibule closure, reduced base of tongue retraction, reduced epiglottic deflection, and reduced pharyngeal contractility. There are trace pharyngeal clearance deficits post primary swallow. There is laryngeal penetration during the swallow without retrieval leaving trace residue in the laryngeal vestibule migrating towards the level of the vocal folds. 1. Mild oral stage for puree, regular solids, moderately thick liquids, mildly thick liquids, and thin liquids characterized by adequate oral containment, adequate bolus manipulation, mildly slow mastication for regular solids, adequate anterior to posterior transfer with piecemeal transfer/deglutition (2 swallows) for puree/regular solids, premature spillage to the hypopharynx (vallecular) due to reduced tongue to palate seal for thin liquids, and adequate oral clearance.   2. Mild Pharyngeal Stage for puree, regular solids, moderately thick liquids, and mildly thick liquids characterized by adequate initiation of the pharyngeal swallow, adequate laryngeal elevation, adequate laryngeal vestibule closure, reduced base of tongue retraction, adequate epiglottic deflection, and reduced pharyngeal contractility. There are trace/mild pharyngeal clearance deficits vallecular>pyriforms post primary swallow greater for puree/regular solids. Spontaneous reswallow partially assists with pharyngeal clearance. There is No Aspiration before, during, or after the swallow for puree, regular solids, moderately thick liquids, and mildly thick liquids.   3. Moderate pharyngeal stage for thin liquids characterized by delayed initiation of the pharyngeal swallow (Bolus head at the vallecular), reduced laryngeal elevation, reduced laryngeal vestibule closure, reduced base of tongue retraction, reduced epiglottic deflection, and reduced pharyngeal contractility. There are trace pharyngeal clearance deficits post primary swallow. There is laryngeal penetration during the swallow without retrieval leaving trace residue in the laryngeal vestibule migrating towards the level of the vocal folds.

## 2024-10-28 NOTE — SWALLOW VFSS/MBS ASSESSMENT ADULT - RECOMMENDED CONSISTENCY
1. Regular Solids with Mildly Thick Liquids  2. Swallowing Guidelines: Seated Upright during Mealtimes; Slow Pacing; Take Small Bites & Chew Solids Well; Single/Small Sips; One Bite/Sip at a Time  3. Maintain Adequate Oral Hygiene  4. Aspiration and Reflux Precautions 1. Soft and Bite Size Solids with Mildly Thick Liquids  2. Swallowing Guidelines: Seated Upright during Mealtimes; Slow Pacing; Take Small Bites & Chew Solids Well; Single/Small Sips; One Bite/Sip at a Time  3. Maintain Adequate Oral Hygiene  4. Aspiration and Reflux Precautions 1. Easy to Chew Solids with Mildly Thick Liquids  2. Swallowing Guidelines: Seated Upright during Mealtimes; Slow Pacing; Take Small Bites & Chew Solids Well; Single/Small Sips; One Bite/Sip at a Time  3. Maintain Adequate Oral Hygiene  4. Aspiration and Reflux Precautions

## 2024-10-29 ENCOUNTER — TRANSCRIPTION ENCOUNTER (OUTPATIENT)
Age: 59
End: 2024-10-29

## 2024-10-29 VITALS
HEART RATE: 83 BPM | RESPIRATION RATE: 18 BRPM | TEMPERATURE: 98 F | SYSTOLIC BLOOD PRESSURE: 165 MMHG | DIASTOLIC BLOOD PRESSURE: 75 MMHG | OXYGEN SATURATION: 100 %

## 2024-10-29 LAB
ALBUMIN SERPL ELPH-MCNC: 3.6 G/DL — SIGNIFICANT CHANGE UP (ref 3.3–5)
ALP SERPL-CCNC: 87 U/L — SIGNIFICANT CHANGE UP (ref 40–120)
ALT FLD-CCNC: 21 U/L — SIGNIFICANT CHANGE UP (ref 4–33)
ANION GAP SERPL CALC-SCNC: 13 MMOL/L — SIGNIFICANT CHANGE UP (ref 7–14)
AST SERPL-CCNC: 25 U/L — SIGNIFICANT CHANGE UP (ref 4–32)
BASOPHILS # BLD AUTO: 0.03 K/UL — SIGNIFICANT CHANGE UP (ref 0–0.2)
BASOPHILS NFR BLD AUTO: 0.4 % — SIGNIFICANT CHANGE UP (ref 0–2)
BILIRUB SERPL-MCNC: 0.4 MG/DL — SIGNIFICANT CHANGE UP (ref 0.2–1.2)
BUN SERPL-MCNC: 8 MG/DL — SIGNIFICANT CHANGE UP (ref 7–23)
CALCIUM SERPL-MCNC: 9.4 MG/DL — SIGNIFICANT CHANGE UP (ref 8.4–10.5)
CHLORIDE SERPL-SCNC: 99 MMOL/L — SIGNIFICANT CHANGE UP (ref 98–107)
CO2 SERPL-SCNC: 24 MMOL/L — SIGNIFICANT CHANGE UP (ref 22–31)
CREAT SERPL-MCNC: 0.5 MG/DL — SIGNIFICANT CHANGE UP (ref 0.5–1.3)
EGFR: 109 ML/MIN/1.73M2 — SIGNIFICANT CHANGE UP
EOSINOPHIL # BLD AUTO: 0.19 K/UL — SIGNIFICANT CHANGE UP (ref 0–0.5)
EOSINOPHIL NFR BLD AUTO: 2.4 % — SIGNIFICANT CHANGE UP (ref 0–6)
GLUCOSE SERPL-MCNC: 235 MG/DL — HIGH (ref 70–99)
HCT VFR BLD CALC: 41.3 % — SIGNIFICANT CHANGE UP (ref 34.5–45)
HGB BLD-MCNC: 14 G/DL — SIGNIFICANT CHANGE UP (ref 11.5–15.5)
IANC: 5.9 K/UL — SIGNIFICANT CHANGE UP (ref 1.8–7.4)
IMM GRANULOCYTES NFR BLD AUTO: 0.4 % — SIGNIFICANT CHANGE UP (ref 0–0.9)
LYMPHOCYTES # BLD AUTO: 1.23 K/UL — SIGNIFICANT CHANGE UP (ref 1–3.3)
LYMPHOCYTES # BLD AUTO: 15.2 % — SIGNIFICANT CHANGE UP (ref 13–44)
MCHC RBC-ENTMCNC: 25.5 PG — LOW (ref 27–34)
MCHC RBC-ENTMCNC: 33.9 GM/DL — SIGNIFICANT CHANGE UP (ref 32–36)
MCV RBC AUTO: 75.1 FL — LOW (ref 80–100)
MONOCYTES # BLD AUTO: 0.69 K/UL — SIGNIFICANT CHANGE UP (ref 0–0.9)
MONOCYTES NFR BLD AUTO: 8.6 % — SIGNIFICANT CHANGE UP (ref 2–14)
NEUTROPHILS # BLD AUTO: 5.9 K/UL — SIGNIFICANT CHANGE UP (ref 1.8–7.4)
NEUTROPHILS NFR BLD AUTO: 73 % — SIGNIFICANT CHANGE UP (ref 43–77)
NRBC # BLD: 0 /100 WBCS — SIGNIFICANT CHANGE UP (ref 0–0)
NRBC # FLD: 0 K/UL — SIGNIFICANT CHANGE UP (ref 0–0)
PLATELET # BLD AUTO: 366 K/UL — SIGNIFICANT CHANGE UP (ref 150–400)
POTASSIUM SERPL-MCNC: 4 MMOL/L — SIGNIFICANT CHANGE UP (ref 3.5–5.3)
POTASSIUM SERPL-SCNC: 4 MMOL/L — SIGNIFICANT CHANGE UP (ref 3.5–5.3)
PROT SERPL-MCNC: 7.3 G/DL — SIGNIFICANT CHANGE UP (ref 6–8.3)
RBC # BLD: 5.5 M/UL — HIGH (ref 3.8–5.2)
RBC # FLD: 13.3 % — SIGNIFICANT CHANGE UP (ref 10.3–14.5)
SODIUM SERPL-SCNC: 136 MMOL/L — SIGNIFICANT CHANGE UP (ref 135–145)
WBC # BLD: 8.07 K/UL — SIGNIFICANT CHANGE UP (ref 3.8–10.5)
WBC # FLD AUTO: 8.07 K/UL — SIGNIFICANT CHANGE UP (ref 3.8–10.5)

## 2024-10-29 PROCEDURE — 99239 HOSP IP/OBS DSCHRG MGMT >30: CPT

## 2024-10-29 RX ORDER — AMLODIPINE BESYLATE 10 MG
1 TABLET ORAL
Qty: 30 | Refills: 0
Start: 2024-10-29 | End: 2024-11-27

## 2024-10-29 RX ADMIN — Medication 5 MILLIGRAM(S): at 06:17

## 2024-10-29 NOTE — CHART NOTE - NSCHARTNOTEFT_GEN_A_CORE
Discussed with pathology. They report that surgical pathology report was ordered wrong, so it was cancelled and switched to hematopathology report (specimen received).
Spoke with ENT WILLIAMS Bartlett pt cleared for discharge from ENT perspective. Pathology still pending so patient should follow up with Head and Neck Onc Surgeon within 1-2 weeks ([phone]).     Omaira Quispe PA-C  Department of Internal Medicine  pager [phone], available on [employer] TEAMS

## 2024-10-29 NOTE — DISCHARGE NOTE NURSING/CASE MANAGEMENT/SOCIAL WORK - FINANCIAL ASSISTANCE
[facility] provides services at a reduced cost to those who are determined to be eligible through [facility]’s financial assistance program. Information regarding [facility]’s financial assistance program can be found by going to [email or web address] or by calling [phone].

## 2024-10-29 NOTE — DISCHARGE NOTE NURSING/CASE MANAGEMENT/SOCIAL WORK - NSDCFUADDAPPT_GEN_ALL_CORE_FT
You should receive a call from the Cancer Center coordinator for a follow up appointment.  IF you don't hear from them in a week, please call [phone] to arrange a follow up appointment

## 2024-10-29 NOTE — DISCHARGE NOTE NURSING/CASE MANAGEMENT/SOCIAL WORK - NSDCPEFALRISK_GEN_ALL_CORE
For information on Fall & Injury Prevention, visit: [email or web address] OR  [email or web address] OR  https://www.cdc.gov/steadi/patient.html

## 2024-10-29 NOTE — DISCHARGE NOTE NURSING/CASE MANAGEMENT/SOCIAL WORK - PATIENT PORTAL LINK FT
You can access the FollowMyHealth Patient Portal offered by [facility] by registering at the following website: [email or web address]. By joining [facility]’s FollowMyHealth portal, you will also be able to view your health information using other applications (apps) compatible with our system.

## 2024-10-30 ENCOUNTER — NON-APPOINTMENT (OUTPATIENT)
Age: 59
End: 2024-10-30

## 2024-10-30 PROBLEM — Z00.00 ENCOUNTER FOR PREVENTIVE HEALTH EXAMINATION: Status: ACTIVE | Noted: 2024-10-30

## 2024-10-30 PROBLEM — J35.8 OTHER CHRONIC DISEASES OF TONSILS AND ADENOIDS: Chronic | Status: ACTIVE | Noted: 2024-10-26

## 2024-10-30 PROBLEM — K14.8 OTHER DISEASES OF TONGUE: Chronic | Status: ACTIVE | Noted: 2024-10-26

## 2024-10-30 LAB — FIBRINOGEN AG PPP IA-MCNC: 440 MG/DL — SIGNIFICANT CHANGE UP (ref 233–496)

## 2024-10-31 ENCOUNTER — NON-APPOINTMENT (OUTPATIENT)
Age: 59
End: 2024-10-31

## 2024-11-01 ENCOUNTER — INPATIENT (INPATIENT)
Facility: HOSPITAL | Age: 59
LOS: 5 days | Discharge: HOME | End: 2024-11-07
Attending: HOSPITALIST | Admitting: HOSPITALIST
Payer: MEDICAID

## 2024-11-01 ENCOUNTER — NON-APPOINTMENT (OUTPATIENT)
Age: 59
End: 2024-11-01

## 2024-11-01 VITALS
HEART RATE: 117 BPM | WEIGHT: 225.09 LBS | RESPIRATION RATE: 22 BRPM | DIASTOLIC BLOOD PRESSURE: 86 MMHG | TEMPERATURE: 98 F | SYSTOLIC BLOOD PRESSURE: 143 MMHG | OXYGEN SATURATION: 97 % | HEIGHT: 65 IN

## 2024-11-01 DIAGNOSIS — I10 ESSENTIAL (PRIMARY) HYPERTENSION: ICD-10-CM

## 2024-11-01 DIAGNOSIS — J35.8 OTHER CHRONIC DISEASES OF TONSILS AND ADENOIDS: ICD-10-CM

## 2024-11-01 DIAGNOSIS — Z90.49 ACQUIRED ABSENCE OF OTHER SPECIFIED PARTS OF DIGESTIVE TRACT: Chronic | ICD-10-CM

## 2024-11-01 DIAGNOSIS — R49.0 DYSPHONIA: ICD-10-CM

## 2024-11-01 LAB
ALBUMIN SERPL ELPH-MCNC: 3.8 G/DL — SIGNIFICANT CHANGE UP (ref 3.3–5)
ALP SERPL-CCNC: 94 U/L — SIGNIFICANT CHANGE UP (ref 40–120)
ALT FLD-CCNC: 25 U/L — SIGNIFICANT CHANGE UP (ref 4–33)
ANION GAP SERPL CALC-SCNC: 18 MMOL/L — HIGH (ref 7–14)
AST SERPL-CCNC: 20 U/L — SIGNIFICANT CHANGE UP (ref 4–32)
BASOPHILS # BLD AUTO: 0.03 K/UL — SIGNIFICANT CHANGE UP (ref 0–0.2)
BASOPHILS NFR BLD AUTO: 0.3 % — SIGNIFICANT CHANGE UP (ref 0–2)
BILIRUB SERPL-MCNC: 0.5 MG/DL — SIGNIFICANT CHANGE UP (ref 0.2–1.2)
BUN SERPL-MCNC: 9 MG/DL — SIGNIFICANT CHANGE UP (ref 7–23)
CALCIUM SERPL-MCNC: 9.5 MG/DL — SIGNIFICANT CHANGE UP (ref 8.4–10.5)
CHLORIDE SERPL-SCNC: 98 MMOL/L — SIGNIFICANT CHANGE UP (ref 98–107)
CO2 SERPL-SCNC: 23 MMOL/L — SIGNIFICANT CHANGE UP (ref 22–31)
CREAT SERPL-MCNC: 0.56 MG/DL — SIGNIFICANT CHANGE UP (ref 0.5–1.3)
EGFR: 106 ML/MIN/1.73M2 — SIGNIFICANT CHANGE UP
EOSINOPHIL # BLD AUTO: 0.08 K/UL — SIGNIFICANT CHANGE UP (ref 0–0.5)
EOSINOPHIL NFR BLD AUTO: 0.7 % — SIGNIFICANT CHANGE UP (ref 0–6)
GLUCOSE SERPL-MCNC: 196 MG/DL — HIGH (ref 70–99)
HCT VFR BLD CALC: 40.9 % — SIGNIFICANT CHANGE UP (ref 34.5–45)
HGB BLD-MCNC: 14 G/DL — SIGNIFICANT CHANGE UP (ref 11.5–15.5)
IANC: 9.26 K/UL — HIGH (ref 1.8–7.4)
IMM GRANULOCYTES NFR BLD AUTO: 0.4 % — SIGNIFICANT CHANGE UP (ref 0–0.9)
LDH SERPL L TO P-CCNC: 449 U/L — HIGH (ref 135–225)
LYMPHOCYTES # BLD AUTO: 1.03 K/UL — SIGNIFICANT CHANGE UP (ref 1–3.3)
LYMPHOCYTES # BLD AUTO: 9.1 % — LOW (ref 13–44)
MAGNESIUM SERPL-MCNC: 2 MG/DL — SIGNIFICANT CHANGE UP (ref 1.6–2.6)
MCHC RBC-ENTMCNC: 26 PG — LOW (ref 27–34)
MCHC RBC-ENTMCNC: 34.2 G/DL — SIGNIFICANT CHANGE UP (ref 32–36)
MCV RBC AUTO: 76 FL — LOW (ref 80–100)
MONOCYTES # BLD AUTO: 0.9 K/UL — SIGNIFICANT CHANGE UP (ref 0–0.9)
MONOCYTES NFR BLD AUTO: 7.9 % — SIGNIFICANT CHANGE UP (ref 2–14)
NEUTROPHILS # BLD AUTO: 9.26 K/UL — HIGH (ref 1.8–7.4)
NEUTROPHILS NFR BLD AUTO: 81.6 % — HIGH (ref 43–77)
NRBC # BLD: 0 /100 WBCS — SIGNIFICANT CHANGE UP (ref 0–0)
NRBC # FLD: 0 K/UL — SIGNIFICANT CHANGE UP (ref 0–0)
PHOSPHATE SERPL-MCNC: 2.8 MG/DL — SIGNIFICANT CHANGE UP (ref 2.5–4.5)
PLATELET # BLD AUTO: 358 K/UL — SIGNIFICANT CHANGE UP (ref 150–400)
POTASSIUM SERPL-MCNC: 3.9 MMOL/L — SIGNIFICANT CHANGE UP (ref 3.5–5.3)
POTASSIUM SERPL-SCNC: 3.9 MMOL/L — SIGNIFICANT CHANGE UP (ref 3.5–5.3)
PROT SERPL-MCNC: 7.8 G/DL — SIGNIFICANT CHANGE UP (ref 6–8.3)
RBC # BLD: 5.38 M/UL — HIGH (ref 3.8–5.2)
RBC # FLD: 13.6 % — SIGNIFICANT CHANGE UP (ref 10.3–14.5)
SODIUM SERPL-SCNC: 139 MMOL/L — SIGNIFICANT CHANGE UP (ref 135–145)
URATE SERPL-MCNC: 5.7 MG/DL — SIGNIFICANT CHANGE UP (ref 2.5–7)
WBC # BLD: 11.34 K/UL — HIGH (ref 3.8–10.5)
WBC # FLD AUTO: 11.34 K/UL — HIGH (ref 3.8–10.5)

## 2024-11-01 PROCEDURE — 99285 EMERGENCY DEPT VISIT HI MDM: CPT

## 2024-11-01 PROCEDURE — 99497 ADVNCD CARE PLAN 30 MIN: CPT | Mod: 25

## 2024-11-01 PROCEDURE — 99255 IP/OBS CONSLTJ NEW/EST HI 80: CPT | Mod: GC

## 2024-11-01 PROCEDURE — 99223 1ST HOSP IP/OBS HIGH 75: CPT

## 2024-11-01 RX ORDER — PANTOPRAZOLE SODIUM 40 MG/1
40 TABLET, DELAYED RELEASE ORAL
Refills: 0 | Status: DISCONTINUED | OUTPATIENT
Start: 2024-11-01 | End: 2024-11-07

## 2024-11-01 RX ORDER — AMLODIPINE BESYLATE 10 MG
5 TABLET ORAL
Refills: 0 | Status: DISCONTINUED | OUTPATIENT
Start: 2024-11-01 | End: 2024-11-07

## 2024-11-01 RX ORDER — MAGNESIUM, ALUMINUM HYDROXIDE 200-200 MG
30 TABLET,CHEWABLE ORAL EVERY 4 HOURS
Refills: 0 | Status: DISCONTINUED | OUTPATIENT
Start: 2024-11-01 | End: 2024-11-07

## 2024-11-01 RX ORDER — MELATONIN 5 MG
3 TABLET ORAL AT BEDTIME
Refills: 0 | Status: DISCONTINUED | OUTPATIENT
Start: 2024-11-01 | End: 2024-11-07

## 2024-11-01 RX ORDER — SODIUM CHLORIDE 9 MG/ML
1000 INJECTION, SOLUTION INTRAMUSCULAR; INTRAVENOUS; SUBCUTANEOUS
Refills: 0 | Status: ACTIVE | OUTPATIENT
Start: 2024-11-01 | End: 2025-09-30

## 2024-11-01 RX ORDER — ENOXAPARIN SODIUM 40MG/0.4ML
40 SYRINGE (ML) SUBCUTANEOUS EVERY 24 HOURS
Refills: 0 | Status: DISCONTINUED | OUTPATIENT
Start: 2024-11-02 | End: 2024-11-05

## 2024-11-01 RX ORDER — ONDANSETRON HYDROCHLORIDE 2 MG/ML
4 INJECTION, SOLUTION INTRAMUSCULAR; INTRAVENOUS EVERY 8 HOURS
Refills: 0 | Status: DISCONTINUED | OUTPATIENT
Start: 2024-11-01 | End: 2024-11-07

## 2024-11-01 RX ORDER — ACETAMINOPHEN 500 MG
650 TABLET ORAL EVERY 6 HOURS
Refills: 0 | Status: DISCONTINUED | OUTPATIENT
Start: 2024-11-01 | End: 2024-11-07

## 2024-11-01 RX ORDER — ALLOPURINOL 100 MG
300 TABLET ORAL DAILY
Refills: 0 | Status: DISCONTINUED | OUTPATIENT
Start: 2024-11-01 | End: 2024-11-07

## 2024-11-01 RX ADMIN — SODIUM CHLORIDE 100 MILLILITER(S): 9 INJECTION, SOLUTION INTRAMUSCULAR; INTRAVENOUS; SUBCUTANEOUS at 23:52

## 2024-11-01 RX ADMIN — Medication 100 MILLIGRAM(S): at 16:55

## 2024-11-01 RX ADMIN — SODIUM CHLORIDE 100 MILLILITER(S): 9 INJECTION, SOLUTION INTRAMUSCULAR; INTRAVENOUS; SUBCUTANEOUS at 16:55

## 2024-11-01 NOTE — H&P ADULT - PROBLEM SELECTOR PLAN 1
Awaiting pathology results  [facility] consulted: Prednisone 100mg daily, allopurinol 300mg daily, TLS labs Q8, TTE. Plan to initiate chemotherapy 11/2/24

## 2024-11-01 NOTE — ED PROVIDER NOTE - CLINICAL SUMMARY MEDICAL DECISION MAKING FREE TEXT BOX
[place], PGY1: Senia Martinez is a 57yo female PMH HTN and palantine tonsil mass c/f malignancy (awaiting biopsy results) presenting with dysphagia and hoarse voice. She was admitted one week ago for a large left palatine tonsil mass with associated bilateral cervical LAD. A biopsy was performed that was c/f lymphoma. [facility] sent her to be admitted to start chemo. Will get requested labs (basic, TLS labs), TTE, and started predisone/IVF per [facility] request and admit.

## 2024-11-01 NOTE — ED PROVIDER NOTE - ATTENDING CONTRIBUTION TO CARE
I personally made the management plan, and take responsibility for the patient's management. I have discussed with and reviewed the Resident's note and agree with the History, ROS, Physical Exam and MDM unless otherwise indicated. A brief summary of my personal evaluation and impression can be found below.     57 yo female PMH HTN and palantine tonsil mass c/f malignancy (awaiting biopsy results) presenting with hoarse voice, unchanged from recent admission. Pt discharged 3 days ago. Reports unchanged dysphonia and pooling of secretions. Denies shortness of breath.     Heme onc-arrives to ed states pt told to present for initiation of chemo.     General: Well-appearing, NAD  Head: Atraumatic, normocephalic  Eyes: EOM grossly in tact, no scleral icterus  ENT: moist mucous membranes, neck swelling present, dysphonia   Neurology: A&Ox 3  Respiratory: normal respiratory effort, no stridor or retractions  CV: Extremities warm and well perfused  Extremities: No edema  Skin: No rashes    Patient presenting with known malignancy for admission for initiation of chemo.  Heme-onc at the bedside requesting CBC, CMP, mag, Phos, uric acid, LDH, G6PD, TTE, prednisone to be given at 100 mg daily for the next 5 days, once creatinine clearance available they are recommending allopurinol if normal 300 mg daily if dysfunction 100 mg daily.  Patient to be admitted to hospitalist.

## 2024-11-01 NOTE — H&P ADULT - NSHPADDITIONALINFOADULT_GEN_ALL_CORE
Medication list confirmed with pt at bedside  Social work consulted as pt currently no employed and seeking guidance on assistance

## 2024-11-01 NOTE — CONSULT NOTE ADULT - ASSESSMENT
SYNTHESIS  58F with HTN presents to ER after her pathology from prior admission raised concern for T Cell Lymphoma, which given her airway concerns and this disease would necessitate initiation of treatment inpatient.    #T Cell Lymphoma  Patient without immediate need for intubation but if breathing or swallowing capacity worsens, low threshold for ICU consultation.     RECOMMENDATIONS  - Please obtain CBC, CMP, Mg, Phos, LDH, Uric Acid, G6PD  - Monitor TLS Labs Q8H: BMP+Mg+Phos, LDH, Uric Acid  - TTE Ordered, expedited to be performed tomorrow  - START Prednisone 100mg PO QD x5 days (if unable to tolerate PO, please give Methylpred 80mg IV QD for total of 5 days instead)  - START Allopurinol  ----- If Creatinine clearance WNL: Allopurinol 300mg PO QD  ----- If Creatinine clearance low or Cr > 1.5: Allopurinol 100mg PO QD  - If creatinine worsening, low threshold to start rasburicase, consult nephro, and notify hematology (even if overnight)    Alfonzo Schneider MD PGY-4 SYNTHESIS  58F with HTN presents to ER after her pathology from prior admission raised concern for T Cell Lymphoma, which given her airway concerns and this disease would necessitate initiation of treatment inpatient.    #T Cell Lymphoma  Patient without immediate need for intubation but if breathing or swallowing capacity worsens, low threshold for ICU consultation.     RECOMMENDATIONS  - Please obtain CBC, CMP, Mg, Phos, LDH, Uric Acid, G6PD  - Monitor TLS Labs Q8H: BMP+Mg+Phos, LDH, Uric Acid  - TTE Ordered, expedited to be performed tomorrow  - START Prednisone 100mg PO QD x5 days (if unable to tolerate PO, please give Methylpred 80mg IV QD for total of 5 days instead)  - START Allopurinol  ----- If Creatinine clearance WNL: Allopurinol 300mg PO QD  ----- If Creatinine clearance low or Cr > 1.5: Allopurinol 100mg PO QD  - If uric acid > 8, give 3 mg IV rasburicase, and if uric acid > 12 give 6 mg rasburicase  - If creatinine worsening, low threshold to start rasburicase, consult nephro, and notify hematology (even if overnight)    Alfonzo Schneider MD PGY-4 SYNTHESIS  58F with HTN presents to ER after her pathology from prior admission raised concern for T Cell Lymphoma, which given her airway concerns and this disease would necessitate initiation of treatment inpatient.    #T Cell Lymphoma  Patient without immediate need for intubation but if breathing or swallowing capacity worsens, low threshold for ICU consultation.     RECOMMENDATIONS  - Please obtain CBC, CMP, Mg, Phos, LDH, Uric Acid, G6PD  - Monitor TLS Labs Q8H: BMP+Mg+Phos, LDH, Uric Acid  - TTE Ordered, expedited to be performed tomorrow  - START Prednisone 100mg PO QD x5 days (if unable to tolerate PO, please give Methylpred 80mg IV QD for total of 5 days instead)  - START Allopurinol  ----- If Creatinine clearance WNL: Allopurinol 300mg PO QD  ----- If Creatinine clearance low or Cr > 1.5: Allopurinol 100mg PO QD  - If uric acid > 8, give 3 mg IV rasburicase, and if uric acid > 12 give 6 mg rasburicase  - Patient is a high aspiration risk, if patient unable to swallow solid foods, can give small and bite sized foods. Hopefully, with treatment the tumor will shrink and she will soon be able to swallow solid foods without problems.  - Planning to start chemotherapy C1D1 11/2/24 with cyclophosphamide, doxorubicin, and prednisone. Consent obtained and sent to chemo nurse. Order also sent to [facility] Pharmacist Oncology and chemo nurse.  - If creatinine worsening, low threshold to start rasburicase, consult nephro, and notify hematology (even if overnight)  - Hematology will follow and when ready for discharge will set up with hematologist for continued care    Alfonzo Schneider MD PGY-4 SYNTHESIS  58F with HTN presents to ER after her pathology from prior admission raised concern for lymphoma, which given her airway concerns and this disease would necessitate initiation of treatment inpatient urgently.    #T Cell Lymphoma -- high suspicion for T-cell lymphoma, awaiting further staining   Patient without immediate need for intubation but if breathing or swallowing capacity worsens, low threshold for ICU consultation.     RECOMMENDATIONS  - Please obtain CBC, CMP, Mg, Phos, LDH, Uric Acid, G6PD  - Monitor TLS Labs Q8H: BMP+Mg+Phos, LDH, Uric Acid  - TTE Ordered, expedited to be performed tomorrow  - START Prednisone 100mg PO QD x5 days (if unable to tolerate PO, please give Methylpred 80mg IV QD for total of 5 days instead)  - START Allopurinol  ----- If Creatinine clearance WNL: Allopurinol 300mg PO QD  ----- If Creatinine clearance low or Cr > 1.5: Allopurinol 100mg PO QD  - If uric acid > 8, give 3 mg IV rasburicase, and if uric acid > 12 give 6 mg rasburicase  - Patient is a high aspiration risk, if patient unable to swallow solid foods, can give small and bite sized foods. Hopefully, with treatment the tumor will shrink and she will soon be able to swallow solid foods without problems.  - Planning to start chemotherapy C1D1 11/2/24 with cyclophosphamide, doxorubicin, and prednisone. Consent obtained and sent to chemo nurse. Order also sent to [facility] Pharmacist Oncology and chemo nurse.  - If creatinine worsening, low threshold to start rasburicase, consult nephro, and notify hematology (even if overnight)  - Hematology will follow and when ready for discharge will set up with hematologist for continued care @ [facility]  - If ECHO within normal limits, will tentatively start chemotherapy tomorrow with [facility]   >> Cyclophosphamide 750mg/m2/dose x1 on 11/2/24   >> Doxorubicin 50mg/m2/dose x1 on 11/2/24   >> Prednisone 100mg x5 days starting on 11/1/24   >> Plan to start neupogen 480mcg daily starting on 11/3/24 x5 days. Give first dose at least 24 hours after chemotherapy completed.   - GI ppx for steroids   [ ] follow up final pathology     - Rest of care as per primary team.   Patient seen and discussed with Dr. Upton.     Alfonzo Schneider MD PGY-4 and Kera Bustamante DO, MPH, Medical Oncology Fellow   *Can be reached via Teams, but please contact the on-call fellow after 5pm-8am on weekdays and on weekends.  SYNTHESIS  58F with HTN presents to ER after her pathology from prior admission raised concern for lymphoma, which given her airway concerns and this disease would necessitate initiation of treatment inpatient urgently.    #T Cell Lymphoma -- high suspicion for T-cell lymphoma, awaiting further staining   Patient without immediate need for intubation but if breathing or swallowing capacity worsens, low threshold for ICU consultation.     RECOMMENDATIONS  - Please obtain CBC, CMP, Mg, Phos, LDH, Uric Acid, G6PD  - Monitor TLS Labs Q8H: BMP+Mg+Phos, LDH, Uric Acid  - TTE Ordered, expedited to be performed tomorrow  - START Prednisone 100mg PO QD x5 days   - START Allopurinol  ----- If Creatinine clearance WNL: Allopurinol 300mg PO QD  ----- If Creatinine clearance low or Cr > 1.5: Allopurinol 100mg PO QD  - If uric acid > 8, give 3 mg IV rasburicase, and if uric acid > 12 give 6 mg rasburicase  - Patient is a high aspiration risk, if patient unable to swallow solid foods, can give small and bite sized foods. Hopefully, with treatment the tumor will shrink and she will soon be able to swallow solid foods without problems.  - Planning to start chemotherapy C1D1 11/2/24 with cyclophosphamide, doxorubicin, and prednisone. Consent obtained and sent to chemo nurse. Order also sent to [facility] Pharmacist Oncology and chemo nurse.  - If creatinine worsening, low threshold to start rasburicase, consult nephro, and notify hematology (even if overnight)  - Hematology will follow and when ready for discharge will set up with hematologist for continued care @ [facility]  - If ECHO within normal limits, will tentatively start chemotherapy tomorrow with [facility]   >> Cyclophosphamide 750mg/m2/dose x1 on 11/2/24   >> Doxorubicin 50mg/m2/dose x1 on 11/2/24   >> Prednisone 100mg x5 days starting on 11/1/24   >> Plan to start neupogen 480mcg daily starting on 11/3/24 x5 days. Give first dose at least 24 hours after chemotherapy completed.   - GI ppx for steroids   [ ] follow up final pathology     - Rest of care as per primary team.   Patient seen and discussed with Dr. Upton.     Alfonzo Schneider MD PGY-4 and Kera Bustamante DO, MPH, Medical Oncology Fellow   *Can be reached via Teams, but please contact the on-call fellow after 5pm-8am on weekdays and on weekends.  SYNTHESIS  58F with HTN presents to ER after her pathology from prior admission raised concern for lymphoma, which given her airway concerns and this disease would necessitate initiation of treatment inpatient urgently.    #T Cell Lymphoma -- high suspicion for T-cell lymphoma, awaiting further staining   Patient without immediate need for intubation but if breathing or swallowing capacity worsens, low threshold for ICU consultation.   Staging CT C/A/P during prior admission on 10/25/24 - Left supraclavicular and bilateral axillary lymphadenopathy. Slightly prominent lymph nodes in the abdomen and pelvis. No splenomegaly. Malignancy is possible.    RECOMMENDATIONS  - Please obtain CBC, CMP, Mg, Phos, LDH, Uric Acid, G6PD  - Monitor TLS Labs Q8H: BMP+Mg+Phos, LDH, Uric Acid  - TTE Ordered, expedited to be performed tomorrow  - START Prednisone 100mg PO QD x5 days   - START Allopurinol  ----- If Creatinine clearance WNL: Allopurinol 300mg PO QD  ----- If Creatinine clearance low or Cr > 1.5: Allopurinol 100mg PO QD  - If uric acid > 8, give 3 mg IV rasburicase, and if uric acid > 12 give 6 mg rasburicase  - Patient is a high aspiration risk, if patient unable to swallow solid foods, can give small and bite sized foods. Hopefully, with treatment the tumor will shrink and she will soon be able to swallow solid foods without problems.  - Planning to start chemotherapy C1D1 11/2/24 with cyclophosphamide, doxorubicin, and prednisone. Consent obtained and sent to chemo nurse. Order also sent to [facility] Pharmacist Oncology and chemo nurse.  - If creatinine worsening, low threshold to start rasburicase, consult nephro, and notify hematology (even if overnight)  - Hematology will follow and when ready for discharge will set up with hematologist for continued care @ [facility]  - If ECHO within normal limits, will tentatively start chemotherapy tomorrow with [facility]   >> Cyclophosphamide 750mg/m2/dose x1 on 11/2/24   >> Doxorubicin 50mg/m2/dose x1 on 11/2/24   >> Prednisone 100mg x5 days starting on 11/1/24   >> Plan to start neupogen 480mcg daily starting on 11/3/24 x5 days. Give first dose at least 24 hours after chemotherapy completed.   - GI ppx for steroids   -  follow up final pathology     - Rest of care as per primary team.   Patient seen and discussed with Dr. Upton.     Alfonzo Schneider MD PGY-4 and Kera Bustamante DO, MPH, Medical Oncology Fellow   *Can be reached via Teams, but please contact the on-call fellow after 5pm-8am on weekdays and on weekends.

## 2024-11-01 NOTE — CONSULT NOTE ADULT - ATTENDING COMMENTS
----------------------------------------------------------------------------------------------------  Patient seen and examined on rounds with resident Dr. Alfonzo Schneider and heme/onc [place] Dr. Tanja Banks and Dr. Kera Bustamante  Briefly patient initially presented to ED on Friday 10/25/24 with large left neck mass and tonsilar lesion for which she underwent biopsy by ENT. She was discharged home on [place] 10/27/24 awaiting outpatient clinic appointment. However path confirmed Lymphoma (CD20-, CD30-) most concerning for T cell lymphoma.  - Awaiting finalized pathology results  - given patient's clinical status, will initiate therapy with Cytoxan, Adriamycin, Prednisone with anticipation of adding Brentuxemab Vedotin when path finalized (i.e. confirmed T-cell lymphoma as pathology is leaning in that direction)  - Started prednisone 100mg daily X5 today  - informed consent obtained from patient; chemo orders for Cytoxan/Michael completed and anticipated to start in a.m. pending Echocardiogram showing normal cardiac function  - will monitor tumor lysis labs q12 hours  - gentle hydration today  - allopurinol 300mg daily  - will follow daily.

## 2024-11-01 NOTE — ED ADULT NURSE NOTE - NSFALLUNIVINTERV_ED_ALL_ED
Bed/Stretcher in lowest position, wheels locked, appropriate side rails in place/Call bell, personal items and telephone in reach/Instruct patient to call for assistance before getting out of bed/chair/stretcher/Non-slip footwear applied when patient is off stretcher/[place] to call system/Physically safe environment - no spills, clutter or unnecessary equipment/Purposeful proactive rounding/Room/bathroom lighting operational, light cord in reach

## 2024-11-01 NOTE — H&P ADULT - NSHPLABSRESULTS_GEN_ALL_CORE
14.0   11.34 )-----------( 358      ( 01 Nov 2024 16:45 )             40.9     139  |  98  |  9   ----------------------------<  196[H]     11-01  3.9   |  23  |  0.56    Ca    9.5      01 Nov 2024 16:45  Phos  2.8     11-01  Mg     2.00     11-01    TPro  7.8  /  Alb  3.8  /  TBili  0.5  /  DBili  x   /  AST  20  /  ALT  25  /  AlkPhos  94  11-01    EKG interpreted by myself: sinus tachycardia

## 2024-11-01 NOTE — ED ADULT TRIAGE NOTE - CHIEF COMPLAINT QUOTE
sore throat for 1 month  c/o some drooling  and difficulty swallowing   noted to have voice change at triage time

## 2024-11-01 NOTE — H&P ADULT - NSHPPHYSICALEXAM_GEN_ALL_CORE
PHYSICAL EXAM:  GENERAL: NAD, comfortable at bedside   HEAD:  Atraumatic, Normocephalic  EYES: EOMI, PERRL, conjunctiva and sclera clear  NECK: significant left sided neck swelling with discomfort on palpation with associated muffled voice.  Oropharynx: left posterior pharynx mass appreciated, no difficulty managing secretions   CHEST/LUNG: Clear to auscultation bilaterally; No wheezes, rales or rhonchi  HEART: Regular rate and rhythm; No murmurs, rubs, or gallops, (+)S1, S2  ABDOMEN: Soft, Nontender, Nondistended; Normal Bowel sounds   EXTREMITIES:  2+ Peripheral Pulses, No clubbing, cyanosis, or edema  PSYCH: normal mood and affect  NEUROLOGY: AAOx3, moving all extremities spontaneously   SKIN: No rashes or lesions

## 2024-11-01 NOTE — CONSULT NOTE ADULT - SUBJECTIVE AND OBJECTIVE BOX
HPI:      14 point ROS otherwise negative    PAST MEDICAL & SURGICAL HISTORY:  Tonsillar mass      Tongue lesion      S/P appendectomy          Allergies    No Known Allergies    Intolerances        MEDICATIONS  (STANDING):  predniSONE   Tablet 100 milliGRAM(s) Oral every 24 hours  sodium chloride 0.9%. 1000 milliLiter(s) (100 mL/Hr) IV Continuous <Continuous>    MEDICATIONS  (PRN):      FAMILY HISTORY:  No pertinent family history in first degree relatives        SOCIAL HISTORY: No EtOH, no tobacco    Height (cm): 165.1 (11-01 @ 14:44)  Weight (kg): 102.1 (11-01 @ 18:31)  BMI (kg/m2): 37.5 (11-01 @ 18:31)  BSA (m2): 2.08 (11-01 @ 18:31)    VITALS:   T(F): 98.3 (11-01-24 @ 14:44), Max: 98.3 (11-01-24 @ 14:44)  HR: 117 (11-01-24 @ 14:44)  BP: 143/86 (11-01-24 @ 14:44)  RR: 22 (11-01-24 @ 14:44)  SpO2: 97% (11-01-24 @ 14:44)  Wt(kg): --    PHYSICAL EXAM  GEN: Awake, AOx3, NAD.  HEENT: NCAT  --- significant left-sided neck swelling w/ associated muffled speech  --- NO drooling or difficulty managing secretions  CARDIO: RRR. Normal S1/S2, no m/r/g. No JVD.  RESP: CTAB, no w/r/r; normal respiratory effort  ABD: Soft, NTND.   MSK: No obvious deformity or ROM deficit.   SKIN: Warm, dry.   NEURO: Moves all four extremities spontaneously  PSYCH: Appropriate mood & affect.     LABS:                         14.0   11.34 )-----------( 358      ( 01 Nov 2024 16:45 )             40.9     11-01    139  |  98  |  9   ----------------------------<  196[H]  3.9   |  23  |  0.56    Ca    9.5      01 Nov 2024 16:45  Phos  2.8     11-01  Mg     2.00     11-01    TPro  7.8  /  Alb  3.8  /  TBili  0.5  /  DBili  x   /  AST  20  /  ALT  25  /  AlkPhos  94  11-01    Lactate Dehydrogenase, Serum: 449 U/L (11-01 @ 16:45)  Uric Acid: 5.7 mg/dL (11-01 @ 16:45)  Magnesium: 2.00 mg/dL (11-01 @ 16:45)  Phosphorus: 2.8 mg/dL (11-01 @ 16:45)    IMAGING:  No interval imaging this admission HPI:  58F with no previous significant PMHx who initially presented to the [facility] ED on 10/25/24 with hoarse voice, L neck swelling and dysphagia x1 month. A CT scan was done and showed a 5.0 x 3.4 x 3.0 cm mass in the region of the left palatine tonsil abutting BOT and lingual surface of epiglottis with bilateral cervical LAD, suspicious for malignancy. ENT was consulted and obtained a bedside bx of R lateral tongue and L tonsillar mass on 10/26/24. Patient discharged home awaiting final pathology and called back to return the [facility] ED as prelimary pathology concerning for possible T-cell lymphoma. Reports no new symptoms since discharge but has difficulty eating.     Denies fevers, chills, weight loss, CP, abdominal pain, vomiting, diarrhea,  complaints, LE edema.      PAST MEDICAL & SURGICAL HISTORY:  Tonsillar mass  Tongue lesion  S/P appendectomy    Allergies  No Known Allergies    MEDICATIONS  (STANDING):  predniSONE   Tablet 100 milliGRAM(s) Oral every 24 hours  sodium chloride 0.9%. 1000 milliLiter(s) (100 mL/Hr) IV Continuous <Continuous>    FAMILY HISTORY:  No pertinent family history in first degree relatives    SOCIAL HISTORY: No EtOH, no tobacco    Height (cm): 165.1 (11-01 @ 14:44)  Weight (kg): 102.1 (11-01 @ 18:31)  BMI (kg/m2): 37.5 (11-01 @ 18:31)  BSA (m2): 2.08 (11-01 @ 18:31)    VITALS:   T(F): 98.3 (11-01-24 @ 14:44), Max: 98.3 (11-01-24 @ 14:44)  HR: 117 (11-01-24 @ 14:44)  BP: 143/86 (11-01-24 @ 14:44)  RR: 22 (11-01-24 @ 14:44)  SpO2: 97% (11-01-24 @ 14:44)  Wt(kg): --    PHYSICAL EXAM  GEN: Awake, AOx3, NAD.  HEENT: NCAT  --- significant left-sided neck swelling w/ associated muffled speech  --- NO drooling or difficulty managing secretions  CARDIO: RRR. Normal S1/S2, no m/r/g. No JVD.  RESP: CTAB, no w/r/r; normal respiratory effort  ABD: Soft, NTND.   MSK: No obvious deformity or ROM deficit.   SKIN: Warm, dry.   NEURO: Moves all four extremities spontaneously  PSYCH: Appropriate mood & affect.     LABS:                         14.0   11.34 )-----------( 358      ( 01 Nov 2024 16:45 )             40.9     11-01    139  |  98  |  9   ----------------------------<  196[H]  3.9   |  23  |  0.56    Ca    9.5      01 Nov 2024 16:45  Phos  2.8     11-01  Mg     2.00     11-01    TPro  7.8  /  Alb  3.8  /  TBili  0.5  /  DBili  x   /  AST  20  /  ALT  25  /  AlkPhos  94  11-01    Lactate Dehydrogenase, Serum: 449 U/L (11-01 @ 16:45)  Uric Acid: 5.7 mg/dL (11-01 @ 16:45)  Magnesium: 2.00 mg/dL (11-01 @ 16:45)  Phosphorus: 2.8 mg/dL (11-01 @ 16:45)    IMAGING:  No interval imaging this admission    < from: CT Neck Soft Tissue w/ IV Cont (10.25.24 @ 15:33) >   IMPRESSION:     1.  There is a 5.0 x 3.4 x 3.0 cm mass in the region of the left palatine tonsil, suspicious for malignancy. The mass extends into the soft palate. The mass abuts the contralateral palatine tonsil and markedly narrows the oropharynx. The mass abuts the base of tongue and lingual surface of the epiglottis.   2.  Enlarged, centrally necrotic left level 2/3 jayla mass measuring 5.7 x 4.1 cm. No enhancement in the portion of the left internal jugular vein abutting the mass, suggesting severe compression/occlusion. The left internal jugular vein is patent more proximally and distally.   3.  Numerous additional enlarged and/or abnormal appearing bilateral level I-V cervical lymph nodes. Multiple enlarged bilateral subpectoral and axillary lymph nodes, right greater than left. Findings suggest metastatic disease.     --- End of Report ---     MARIANELA HILARIO MD; Attending Radiologist   This document has been electronically signed. Oct 25 2024  4:23PM     < end of copied text >  HPI:  58F with no previous significant PMHx who initially presented to the [facility] ED on 10/25/24 with hoarse voice, L neck swelling and dysphagia x1 month. A CT scan was done and showed a 5.0 x 3.4 x 3.0 cm mass in the region of the left palatine tonsil abutting BOT and lingual surface of epiglottis with bilateral cervical LAD, suspicious for malignancy. ENT was consulted and obtained a bedside bx of R lateral tongue and L tonsillar mass on 10/26/24. Patient discharged home awaiting final pathology and called back to return the [facility] ED as prelimary pathology concerning for possible T-cell lymphoma. Reports no new symptoms since discharge but has difficulty eating.     Denies fevers, chills, weight loss, CP, abdominal pain, vomiting, diarrhea,  complaints, LE edema.      PAST MEDICAL & SURGICAL HISTORY:  Tonsillar mass  Tongue lesion  S/P appendectomy    Allergies  No Known Allergies    MEDICATIONS  (STANDING):  predniSONE   Tablet 100 milliGRAM(s) Oral every 24 hours  sodium chloride 0.9%. 1000 milliLiter(s) (100 mL/Hr) IV Continuous <Continuous>    FAMILY HISTORY:  No pertinent family history in first degree relatives    SOCIAL HISTORY: No EtOH, no tobacco    Height (cm): 165.1 (11-01 @ 14:44)  Weight (kg): 102.1 (11-01 @ 18:31)  BMI (kg/m2): 37.5 (11-01 @ 18:31)  BSA (m2): 2.08 (11-01 @ 18:31)    VITALS:   T(F): 98.3 (11-01-24 @ 14:44), Max: 98.3 (11-01-24 @ 14:44)  HR: 117 (11-01-24 @ 14:44)  BP: 143/86 (11-01-24 @ 14:44)  RR: 22 (11-01-24 @ 14:44)  SpO2: 97% (11-01-24 @ 14:44)  Wt(kg): --    PHYSICAL EXAM  GEN: Awake, AOx3, NAD.  HEENT: NCAT  --- significant left-sided neck swelling w/ associated muffled speech  --- NO drooling or difficulty managing secretions  CARDIO: RRR. Normal S1/S2, no m/r/g. No JVD.  RESP: CTAB, no w/r/r; normal respiratory effort  ABD: Soft, NTND.   MSK: No obvious deformity or ROM deficit.   SKIN: Warm, dry.   NEURO: Moves all four extremities spontaneously  PSYCH: Appropriate mood & affect.     LABS:                         14.0   11.34 )-----------( 358      ( 01 Nov 2024 16:45 )             40.9     11-01    139  |  98  |  9   ----------------------------<  196[H]  3.9   |  23  |  0.56    Ca    9.5      01 Nov 2024 16:45  Phos  2.8     11-01  Mg     2.00     11-01    TPro  7.8  /  Alb  3.8  /  TBili  0.5  /  DBili  x   /  AST  20  /  ALT  25  /  AlkPhos  94  11-01    Lactate Dehydrogenase, Serum: 449 U/L (11-01 @ 16:45)  Uric Acid: 5.7 mg/dL (11-01 @ 16:45)  Magnesium: 2.00 mg/dL (11-01 @ 16:45)  Phosphorus: 2.8 mg/dL (11-01 @ 16:45)    IMAGING:  No interval imaging this admission    < from: CT Neck Soft Tissue w/ IV Cont (10.25.24 @ 15:33) >   IMPRESSION:     1.  There is a 5.0 x 3.4 x 3.0 cm mass in the region of the left palatine tonsil, suspicious for malignancy. The mass extends into the soft palate. The mass abuts the contralateral palatine tonsil and markedly narrows the oropharynx. The mass abuts the base of tongue and lingual surface of the epiglottis.   2.  Enlarged, centrally necrotic left level 2/3 jayla mass measuring 5.7 x 4.1 cm. No enhancement in the portion of the left internal jugular vein abutting the mass, suggesting severe compression/occlusion. The left internal jugular vein is patent more proximally and distally.   3.  Numerous additional enlarged and/or abnormal appearing bilateral level I-V cervical lymph nodes. Multiple enlarged bilateral subpectoral and axillary lymph nodes, right greater than left. Findings suggest metastatic disease.     --- End of Report ---     MARIANELA HILARIO MD; Attending Radiologist   This document has been electronically signed. Oct 25 2024  4:23PM     < end of copied text >

## 2024-11-01 NOTE — ED PROVIDER NOTE - OBJECTIVE STATEMENT
Senia Martinez is a 59yo female PMH HTN and palantine tonsil mass c/f malignancy (awaiting biopsy results) presenting with dysphagia and hoarse voice. She was admitted one week ago for a large left palatine tonsil mass with associated bilateral cervical LAD. A biopsy was perform and she was evaluated by speech and swallow. They worked with her to be able to swallow soft foods and tolerate liquids. She is due to follow-up in ENT in clinic on Monday with Dr. Bass. She presents today with concerns of the hoarseness of her voice and some saliva that will occasionally drip from her mouth. She is still tolerating PO. She denies fevers, CP, SOB, abdominal pain, nausea, vomit, urinary symptoms, or constipation/diarrhea.

## 2024-11-01 NOTE — ED ADULT NURSE REASSESSMENT NOTE - NS ED NURSE REASSESS COMMENT FT1
Upon reassessment bedside report given to TUAN Smith. Pt A&Ox3 sitting up in stretcher resting comfortably. Pt sleeping at this time, noted to have small amount of drool to gown at this time. No acute distress noted. Airway patent, pt able to speak in full sentences. Pt medicated per EMAR. Pt brought to [facility] 2 via stretcher with RN. Safety maintained throughout.

## 2024-11-01 NOTE — H&P ADULT - HISTORY OF PRESENT ILLNESS
58 yr old female with a pmh of recently diagnosed HTN and neck swelling with a left palatine tonsil mass concerning for T-Cell lymphoma. Pt initially presented to [facility] 10/25/24 with hoarse voice, neck swelling and dysphagia for one month- biopsy performed by ENT and pending results. She presents today as she feels like the mass on her neck is increasing in size and her voice is becoming more hoarse.   Denies  headache, dizziness, chest pain, palpitations, SOB, abdominal pain, joint pain, diarrhea/constipation, urinary symptoms.   Vitals: T 98.3, , /86, RR 22 satting 97% RA

## 2024-11-01 NOTE — ED ADULT NURSE REASSESSMENT NOTE - NS ED NURSE REASSESS COMMENT FT1
Upon reassessment pt sitting up in stretcher resting comfortably. Respirations even and unlabored on room air. No acute distress noted. Airway patent, pt speaking in full sentences, no drooling or stridor noted. Denies headache, dizziness, chest pain and SOB. VS as noted in flow sheet. Plan of care ongoing, comfort measures provided and safety measures maintained. Awaiting bed assignment. Upon reassessment pt sitting up in stretcher resting comfortably. Respirations even and unlabored on room air. No acute distress noted. Airway patent, pt speaking in full sentences, no drooling or stridor noted. Denies headache, dizziness, chest pain and SOB. VS as noted in flow sheet. Pt noted to have hoarse voice that has been consistent since arrival, not worsened. Plan of care ongoing, comfort measures provided and safety measures maintained. Awaiting bed assignment.

## 2024-11-01 NOTE — ED PROVIDER NOTE - CARE PLAN
1 Principal Discharge DX:	Massive tremor   Principal Discharge DX:	Dysphonia  Secondary Diagnosis:	Malignancy

## 2024-11-01 NOTE — H&P ADULT - NSHPREVIEWOFSYSTEMS_GEN_ALL_CORE
REVIEW OF SYSTEMS:    CONSTITUTIONAL: No weakness, fevers or chills  EYES/ENT: No visual changes;  No dysphagia; No sore throat; No rhinorrhea; No sinus pain/pressure + hoarse voice  NECK: + left sided neck swelling  RESPIRATORY: No cough, wheezing, hemoptysis; No shortness of breath  CARDIOVASCULAR: No chest pain or palpitations; No lower extremity edema  GASTROINTESTINAL: No abdominal or epigastric pain. No nausea, vomiting, or hematemesis; No diarrhea or constipation. No melena or hematochezia.  GENITOURINARY: No dysuria, frequency or hematuria  NEUROLOGICAL: No numbness or weakness  MSK: ambulates without assistance   SKIN: No itching, burning, rashes, or lesions   All other review of systems is negative unless indicated above.

## 2024-11-01 NOTE — ED ADULT NURSE NOTE - OBJECTIVE STATEMENT
presents w sore throat s/p dx of tonsil malignancy. denies sob. no fever/chills. speaking in complete sentences, no drooling noted. RR even and unlabored. SL inserted, labs drawn, IVF ongoing.

## 2024-11-01 NOTE — H&P ADULT - CONVERSATION DETAILS
HCP paperwork filled out and placed in chart with 2 copies given to the pt   She has named her eldest daughter, Sisi Martinez [phone], as her HCP

## 2024-11-01 NOTE — ED ADULT TRIAGE NOTE - SPO2 (%)
Received refill request for Spironolactone 25mg 1/2 tab daily and Valsartan 80mg daily for 90 day supply.   LOV 12/14/21, NOV 12/14/22.   BMP 01/13/22. CBC scheduled for Thursday.  Refill sent to [facility].  
97

## 2024-11-01 NOTE — ED PROVIDER NOTE - PHYSICAL EXAMINATION
Const: Awake, alert, no acute distress.  Well appearing.  Moving comfortably on stretcher.  HEENT: NC/AT.  Moist mucous membranes.  Visible obstructing mass protruding from left on posterior pharynx. Hoarse voice.  Eyes: Extraocular movements intact b/l.  Pupils equal, round, and reactive to light b/l.    Neck: Neck supple, full ROM without pain. Visible left sided neck mass with no overlying skin changes. No TTP.  Cardiac: Regular rate and regular rhythm. S1 S2 present.    Resp: Speaking in full sentences. No evidence of respiratory distress.  Good air entry b/l, breath sounds clear to auscultation b/l.  Abd: Non-distended, no overlying skin changes.  Soft, non-tender, no guarding, no rigidity, no rebound tenderness.  No palpable masses.  MSK: Normal ROM and strength throughout.  Skin: Normal coloration.  No rashes, abrasions or lacerations.  Neuro: Awake, alert & oriented x 3.  Moves all extremities spontaneously and symmetrically.  No focal deficits.

## 2024-11-02 ENCOUNTER — RESULT REVIEW (OUTPATIENT)
Age: 59
End: 2024-11-02

## 2024-11-02 LAB
A1C WITH ESTIMATED AVERAGE GLUCOSE RESULT: 11.9 % — HIGH (ref 4–5.6)
ANION GAP SERPL CALC-SCNC: 14 MMOL/L — SIGNIFICANT CHANGE UP (ref 7–14)
ANION GAP SERPL CALC-SCNC: 17 MMOL/L — HIGH (ref 7–14)
ANION GAP SERPL CALC-SCNC: 21 MMOL/L — HIGH (ref 7–14)
BUN SERPL-MCNC: 10 MG/DL — SIGNIFICANT CHANGE UP (ref 7–23)
BUN SERPL-MCNC: 8 MG/DL — SIGNIFICANT CHANGE UP (ref 7–23)
BUN SERPL-MCNC: 9 MG/DL — SIGNIFICANT CHANGE UP (ref 7–23)
CALCIUM SERPL-MCNC: 9.1 MG/DL — SIGNIFICANT CHANGE UP (ref 8.4–10.5)
CALCIUM SERPL-MCNC: 9.1 MG/DL — SIGNIFICANT CHANGE UP (ref 8.4–10.5)
CALCIUM SERPL-MCNC: 9.4 MG/DL — SIGNIFICANT CHANGE UP (ref 8.4–10.5)
CHLORIDE SERPL-SCNC: 96 MMOL/L — LOW (ref 98–107)
CHLORIDE SERPL-SCNC: 98 MMOL/L — SIGNIFICANT CHANGE UP (ref 98–107)
CHLORIDE SERPL-SCNC: 99 MMOL/L — SIGNIFICANT CHANGE UP (ref 98–107)
CO2 SERPL-SCNC: 21 MMOL/L — LOW (ref 22–31)
CO2 SERPL-SCNC: 24 MMOL/L — SIGNIFICANT CHANGE UP (ref 22–31)
CO2 SERPL-SCNC: 24 MMOL/L — SIGNIFICANT CHANGE UP (ref 22–31)
CREAT SERPL-MCNC: 0.44 MG/DL — LOW (ref 0.5–1.3)
CREAT SERPL-MCNC: 0.45 MG/DL — LOW (ref 0.5–1.3)
CREAT SERPL-MCNC: 0.45 MG/DL — LOW (ref 0.5–1.3)
EGFR: 112 ML/MIN/1.73M2 — SIGNIFICANT CHANGE UP
EGFR: 112 ML/MIN/1.73M2 — SIGNIFICANT CHANGE UP
EGFR: 113 ML/MIN/1.73M2 — SIGNIFICANT CHANGE UP
ESTIMATED AVERAGE GLUCOSE: 295 — SIGNIFICANT CHANGE UP
GLUCOSE BLDC GLUCOMTR-MCNC: 267 MG/DL — HIGH (ref 70–99)
GLUCOSE BLDC GLUCOMTR-MCNC: 275 MG/DL — HIGH (ref 70–99)
GLUCOSE SERPL-MCNC: 221 MG/DL — HIGH (ref 70–99)
GLUCOSE SERPL-MCNC: 248 MG/DL — HIGH (ref 70–99)
GLUCOSE SERPL-MCNC: 252 MG/DL — HIGH (ref 70–99)
HCT VFR BLD CALC: 37 % — SIGNIFICANT CHANGE UP (ref 34.5–45)
HGB BLD-MCNC: 12.7 G/DL — SIGNIFICANT CHANGE UP (ref 11.5–15.5)
LDH SERPL L TO P-CCNC: 404 U/L — HIGH (ref 135–225)
LDH SERPL L TO P-CCNC: 407 U/L — HIGH (ref 135–225)
LDH SERPL L TO P-CCNC: 433 U/L — HIGH (ref 135–225)
MAGNESIUM SERPL-MCNC: 1.9 MG/DL — SIGNIFICANT CHANGE UP (ref 1.6–2.6)
MCHC RBC-ENTMCNC: 25.9 PG — LOW (ref 27–34)
MCHC RBC-ENTMCNC: 34.3 G/DL — SIGNIFICANT CHANGE UP (ref 32–36)
MCV RBC AUTO: 75.5 FL — LOW (ref 80–100)
NRBC # BLD: 0 /100 WBCS — SIGNIFICANT CHANGE UP (ref 0–0)
NRBC # FLD: 0 K/UL — SIGNIFICANT CHANGE UP (ref 0–0)
PHOSPHATE SERPL-MCNC: 2.7 MG/DL — SIGNIFICANT CHANGE UP (ref 2.5–4.5)
PHOSPHATE SERPL-MCNC: 3.1 MG/DL — SIGNIFICANT CHANGE UP (ref 2.5–4.5)
PHOSPHATE SERPL-MCNC: 3.7 MG/DL — SIGNIFICANT CHANGE UP (ref 2.5–4.5)
PLATELET # BLD AUTO: 344 K/UL — SIGNIFICANT CHANGE UP (ref 150–400)
POTASSIUM SERPL-MCNC: 3.7 MMOL/L — SIGNIFICANT CHANGE UP (ref 3.5–5.3)
POTASSIUM SERPL-MCNC: 3.8 MMOL/L — SIGNIFICANT CHANGE UP (ref 3.5–5.3)
POTASSIUM SERPL-MCNC: 4.2 MMOL/L — SIGNIFICANT CHANGE UP (ref 3.5–5.3)
POTASSIUM SERPL-SCNC: 3.7 MMOL/L — SIGNIFICANT CHANGE UP (ref 3.5–5.3)
POTASSIUM SERPL-SCNC: 3.8 MMOL/L — SIGNIFICANT CHANGE UP (ref 3.5–5.3)
POTASSIUM SERPL-SCNC: 4.2 MMOL/L — SIGNIFICANT CHANGE UP (ref 3.5–5.3)
RBC # BLD: 4.9 M/UL — SIGNIFICANT CHANGE UP (ref 3.8–5.2)
RBC # FLD: 13.5 % — SIGNIFICANT CHANGE UP (ref 10.3–14.5)
SODIUM SERPL-SCNC: 137 MMOL/L — SIGNIFICANT CHANGE UP (ref 135–145)
SODIUM SERPL-SCNC: 138 MMOL/L — SIGNIFICANT CHANGE UP (ref 135–145)
SODIUM SERPL-SCNC: 139 MMOL/L — SIGNIFICANT CHANGE UP (ref 135–145)
URATE SERPL-MCNC: 4.7 MG/DL — SIGNIFICANT CHANGE UP (ref 2.5–7)
URATE SERPL-MCNC: 5.7 MG/DL — SIGNIFICANT CHANGE UP (ref 2.5–7)
URATE SERPL-MCNC: 5.7 MG/DL — SIGNIFICANT CHANGE UP (ref 2.5–7)
WBC # BLD: 11.5 K/UL — HIGH (ref 3.8–10.5)
WBC # FLD AUTO: 11.5 K/UL — HIGH (ref 3.8–10.5)

## 2024-11-02 PROCEDURE — 99233 SBSQ HOSP IP/OBS HIGH 50: CPT | Mod: GC

## 2024-11-02 PROCEDURE — 99233 SBSQ HOSP IP/OBS HIGH 50: CPT

## 2024-11-02 RX ORDER — ONDANSETRON HYDROCHLORIDE 2 MG/ML
8 INJECTION, SOLUTION INTRAMUSCULAR; INTRAVENOUS EVERY 8 HOURS
Refills: 0 | Status: COMPLETED | OUTPATIENT
Start: 2024-11-02 | End: 2024-11-03

## 2024-11-02 RX ORDER — DIPHENHYDRAMINE HCL 12.5MG/5ML
50 ELIXIR ORAL ONCE
Refills: 0 | Status: DISCONTINUED | OUTPATIENT
Start: 2024-11-02 | End: 2024-11-07

## 2024-11-02 RX ORDER — DEXAMETHASONE 1.5 MG 1.5 MG/1
10 TABLET ORAL ONCE
Refills: 0 | Status: COMPLETED | OUTPATIENT
Start: 2024-11-02 | End: 2024-11-02

## 2024-11-02 RX ORDER — CYCLOPHOSPHAMIDE 500 MG/25ML
1560 INJECTION, POWDER, FOR SOLUTION INTRAVENOUS; ORAL ONCE
Refills: 0 | Status: COMPLETED | OUTPATIENT
Start: 2024-11-02 | End: 2024-11-02

## 2024-11-02 RX ORDER — INSULIN LISPRO 100/ML
VIAL (ML) SUBCUTANEOUS AT BEDTIME
Refills: 0 | Status: DISCONTINUED | OUTPATIENT
Start: 2024-11-02 | End: 2024-11-03

## 2024-11-02 RX ORDER — ONDANSETRON HYDROCHLORIDE 2 MG/ML
8 INJECTION, SOLUTION INTRAMUSCULAR; INTRAVENOUS ONCE
Refills: 0 | Status: COMPLETED | OUTPATIENT
Start: 2024-11-02 | End: 2024-11-02

## 2024-11-02 RX ORDER — GLUCAGON INJECTION, SOLUTION 1 MG/.2ML
1 INJECTION, SOLUTION SUBCUTANEOUS ONCE
Refills: 0 | Status: DISCONTINUED | OUTPATIENT
Start: 2024-11-02 | End: 2024-11-07

## 2024-11-02 RX ORDER — INSULIN LISPRO 100/ML
VIAL (ML) SUBCUTANEOUS
Refills: 0 | Status: DISCONTINUED | OUTPATIENT
Start: 2024-11-02 | End: 2024-11-03

## 2024-11-02 RX ORDER — DOXORUBICIN HCL 2 MG/ML
100 VIAL (ML) INTRAVENOUS ONCE
Refills: 0 | Status: COMPLETED | OUTPATIENT
Start: 2024-11-02 | End: 2024-11-02

## 2024-11-02 RX ORDER — ACETAMINOPHEN 500 MG
650 TABLET ORAL EVERY 6 HOURS
Refills: 0 | Status: DISCONTINUED | OUTPATIENT
Start: 2024-11-02 | End: 2024-11-07

## 2024-11-02 RX ADMIN — Medication 1: at 21:40

## 2024-11-02 RX ADMIN — DEXAMETHASONE 1.5 MG 10 MILLIGRAM(S): 1.5 TABLET ORAL at 11:25

## 2024-11-02 RX ADMIN — Medication 40 MILLIGRAM(S): at 18:03

## 2024-11-02 RX ADMIN — Medication 300 MILLIGRAM(S): at 11:19

## 2024-11-02 RX ADMIN — Medication 3: at 18:02

## 2024-11-02 RX ADMIN — SODIUM CHLORIDE 100 MILLILITER(S): 9 INJECTION, SOLUTION INTRAMUSCULAR; INTRAVENOUS; SUBCUTANEOUS at 11:19

## 2024-11-02 RX ADMIN — CYCLOPHOSPHAMIDE 1560 MILLIGRAM(S): 500 INJECTION, POWDER, FOR SOLUTION INTRAVENOUS; ORAL at 12:21

## 2024-11-02 RX ADMIN — PANTOPRAZOLE SODIUM 40 MILLIGRAM(S): 40 TABLET, DELAYED RELEASE ORAL at 06:22

## 2024-11-02 RX ADMIN — Medication 200 MILLIGRAM(S): at 12:05

## 2024-11-02 RX ADMIN — Medication 100 MILLIGRAM(S): at 14:06

## 2024-11-02 RX ADMIN — ONDANSETRON HYDROCHLORIDE 8 MILLIGRAM(S): 2 INJECTION, SOLUTION INTRAMUSCULAR; INTRAVENOUS at 11:25

## 2024-11-02 RX ADMIN — ONDANSETRON HYDROCHLORIDE 8 MILLIGRAM(S): 2 INJECTION, SOLUTION INTRAMUSCULAR; INTRAVENOUS at 18:53

## 2024-11-02 RX ADMIN — Medication 5 MILLIGRAM(S): at 06:18

## 2024-11-02 RX ADMIN — SODIUM CHLORIDE 100 MILLILITER(S): 9 INJECTION, SOLUTION INTRAMUSCULAR; INTRAVENOUS; SUBCUTANEOUS at 06:08

## 2024-11-02 NOTE — PATIENT PROFILE ADULT - FUNCTIONAL ASSESSMENT - DAILY ACTIVITY 6.
4 = No assist / stand by assistance
Have You Had Fillers Before?: has not had fillers
Additional History: 2 Restylane Refyne - No Charge

## 2024-11-02 NOTE — PATIENT PROFILE ADULT - FALL HARM RISK - UNIVERSAL INTERVENTIONS
Bed in lowest position, wheels locked, appropriate side rails in place/Call bell, personal items and telephone in reach/Instruct patient to call for assistance before getting out of bed or chair/Non-slip footwear when patient is out of bed/[place] to call system/Physically safe environment - no spills, clutter or unnecessary equipment/Purposeful Proactive Rounding/Room/bathroom lighting operational, light cord in reach

## 2024-11-02 NOTE — PROGRESS NOTE ADULT - SUBJECTIVE AND OBJECTIVE BOX
Hematology Follow-up    INTERVAL HPI/OVERNIGHT EVENTS:  Patient seen and evaluated at bedside, overall reports to be feeling well. No overnight events.     VITAL SIGNS:  T(F): 98.3 (11-02-24 @ 06:28)  HR: 84 (11-02-24 @ 06:28)  BP: 151/78 (11-02-24 @ 06:28)  RR: 19 (11-02-24 @ 06:28)  SpO2: 100% (11-02-24 @ 06:28)  Wt(kg): --    PHYSICAL EXAM:    Constitutional: AAOx3, NAD,   Eyes: PERRL, EOMI, sclera non-icteric  Neck: supple, no masses, no JVD  Respiratory: CTA b/l, good air entry b/l, no wheezing, rhonchi, rales, with normal respiratory effort and no intercostal retractions  Cardiovascular: RRR, normal S1S2, no M/R/G  Gastrointestinal: soft, NTND, no masses palpable, BS normal in all four quadrants, no HSM  Extremities:  no c/c/e  Neurological: Grossly intact  Skin: Normal temperature    MEDICATIONS  (STANDING):  allopurinol 300 milliGRAM(s) Oral daily  amLODIPine   Tablet 5 milliGRAM(s) Oral with breakfast  cyclophosphamide IVPB (eMAR) 1560 milliGRAM(s) IV Intermittent once  enoxaparin Injectable 40 milliGRAM(s) SubCutaneous every 24 hours  ondansetron Injectable 8 milliGRAM(s) IV Push every 8 hours  pantoprazole    Tablet 40 milliGRAM(s) Oral before breakfast  predniSONE   Tablet 100 milliGRAM(s) Oral daily  predniSONE   Tablet 100 milliGRAM(s) Oral every 24 hours  sodium chloride 0.9%. 1000 milliLiter(s) (100 mL/Hr) IV Continuous <Continuous>    MEDICATIONS  (PRN):  acetaminophen     Tablet .. 650 milliGRAM(s) Oral every 6 hours PRN PRN REACTION  acetaminophen     Tablet .. 650 milliGRAM(s) Oral every 6 hours PRN Temp greater or equal to 38C (100.4F), Mild Pain (1 - 3)  aluminum hydroxide/magnesium hydroxide/simethicone Suspension 30 milliLiter(s) Oral every 4 hours PRN Dyspepsia  diphenhydrAMINE Injectable 50 milliGRAM(s) IV Push once PRN REACTION  melatonin 3 milliGRAM(s) Oral at bedtime PRN Insomnia  ondansetron Injectable 4 milliGRAM(s) IV Push every 8 hours PRN Nausea and/or Vomiting      No Known Allergies      LABS:                        14.0   11.34 )-----------( 358      ( 01 Nov 2024 16:45 )             40.9     11-02    139  |  98  |  10  ----------------------------<  221[H]  3.8   |  24  |  0.45[L]    Ca    9.4      02 Nov 2024 09:05  Phos  3.1     11-02  Mg     1.90     11-02    TPro  7.8  /  Alb  3.8  /  TBili  0.5  /  DBili  x   /  AST  20  /  ALT  25  /  AlkPhos  94  11-01     Lactate Dehydrogenase, Serum: 407 U/L (11-02 @ 09:05)  Lactate Dehydrogenase, Serum: 433 U/L (11-02 @ 01:07)  Lactate Dehydrogenase, Serum: 449 U/L (11-01 @ 16:45)    Urinalysis Basic - ( 02 Nov 2024 09:05 )    Color: x / Appearance: x / SG: x / pH: x  Gluc: 221 mg/dL / Ketone: x  / Bili: x / Urobili: x   Blood: x / Protein: x / Nitrite: x   Leuk Esterase: x / RBC: x / WBC x   Sq Epi: x / Non Sq Epi: x / Bacteria: x        RADIOLOGY & ADDITIONAL TESTS:  Studies reviewed.   Hematology Follow-up    INTERVAL HPI/OVERNIGHT EVENTS:  Patient seen and evaluated at bedside, overall reports to be feeling well. No overnight events.     VITAL SIGNS:  T(F): 98.3 (11-02-24 @ 06:28)  HR: 84 (11-02-24 @ 06:28)  BP: 151/78 (11-02-24 @ 06:28)  RR: 19 (11-02-24 @ 06:28)  SpO2: 100% (11-02-24 @ 06:28)  Wt(kg): --    PHYSICAL EXAM  GEN: NAD   HEENT: + left-sided neck swelling, nontender,  + oropharyngeal mass visible   CARDIO: Regular rate and rhythem   RESP: CTAB, no w/r/r; normal respiratory effort  ABD: Soft, NTND  MSK: No obvious deformity or ROM deficit.   SKIN: Warm, dry.   NEURO: Moves all four extremities spontaneously  PSYCH: Appropriate mood & affect.     MEDICATIONS  (STANDING):  allopurinol 300 milliGRAM(s) Oral daily  amLODIPine   Tablet 5 milliGRAM(s) Oral with breakfast  cyclophosphamide IVPB (eMAR) 1560 milliGRAM(s) IV Intermittent once  enoxaparin Injectable 40 milliGRAM(s) SubCutaneous every 24 hours  ondansetron Injectable 8 milliGRAM(s) IV Push every 8 hours  pantoprazole    Tablet 40 milliGRAM(s) Oral before breakfast  predniSONE   Tablet 100 milliGRAM(s) Oral daily  predniSONE   Tablet 100 milliGRAM(s) Oral every 24 hours  sodium chloride 0.9%. 1000 milliLiter(s) (100 mL/Hr) IV Continuous <Continuous>    MEDICATIONS  (PRN):  acetaminophen     Tablet .. 650 milliGRAM(s) Oral every 6 hours PRN PRN REACTION  acetaminophen     Tablet .. 650 milliGRAM(s) Oral every 6 hours PRN Temp greater or equal to 38C (100.4F), Mild Pain (1 - 3)  aluminum hydroxide/magnesium hydroxide/simethicone Suspension 30 milliLiter(s) Oral every 4 hours PRN Dyspepsia  diphenhydrAMINE Injectable 50 milliGRAM(s) IV Push once PRN REACTION  melatonin 3 milliGRAM(s) Oral at bedtime PRN Insomnia  ondansetron Injectable 4 milliGRAM(s) IV Push every 8 hours PRN Nausea and/or Vomiting      No Known Allergies      LABS:                        14.0   11.34 )-----------( 358      ( 01 Nov 2024 16:45 )             40.9     11-02    139  |  98  |  10  ----------------------------<  221[H]  3.8   |  24  |  0.45[L]    Ca    9.4      02 Nov 2024 09:05  Phos  3.1     11-02  Mg     1.90     11-02    TPro  7.8  /  Alb  3.8  /  TBili  0.5  /  DBili  x   /  AST  20  /  ALT  25  /  AlkPhos  94  11-01     Lactate Dehydrogenase, Serum: 407 U/L (11-02 @ 09:05)  Lactate Dehydrogenase, Serum: 433 U/L (11-02 @ 01:07)  Lactate Dehydrogenase, Serum: 449 U/L (11-01 @ 16:45)    Urinalysis Basic - ( 02 Nov 2024 09:05 )    Color: x / Appearance: x / SG: x / pH: x  Gluc: 221 mg/dL / Ketone: x  / Bili: x / Urobili: x   Blood: x / Protein: x / Nitrite: x   Leuk Esterase: x / RBC: x / WBC x   Sq Epi: x / Non Sq Epi: x / Bacteria: x        RADIOLOGY & ADDITIONAL TESTS:  Studies reviewed.   Hematology Follow-up    INTERVAL HPI/OVERNIGHT EVENTS:  Patient seen and evaluated at bedside, overall reports to be feeling well. No overnight events.     VITAL SIGNS:  T(F): 98.3 (11-02-24 @ 06:28)  HR: 84 (11-02-24 @ 06:28)  BP: 151/78 (11-02-24 @ 06:28)  RR: 19 (11-02-24 @ 06:28)  SpO2: 100% (11-02-24 @ 06:28)  Wt(kg): --    PHYSICAL EXAM  GEN: NAD   HEENT: + left-sided neck swelling, nontender,  + oropharyngeal mass visible   CARDIO: Regular rate and rhythem   RESP: CTAB, no w/r/r; normal respiratory effort  ABD: Soft, NTND  MSK: No obvious deformity or ROM deficit.   SKIN: Warm, dry.   NEURO: Moves all four extremities spontaneously  PSYCH: Appropriate mood & affect.     MEDICATIONS  (STANDING):  allopurinol 300 milliGRAM(s) Oral daily  amLODIPine   Tablet 5 milliGRAM(s) Oral with breakfast  cyclophosphamide IVPB (eMAR) 1560 milliGRAM(s) IV Intermittent once  enoxaparin Injectable 40 milliGRAM(s) SubCutaneous every 24 hours  ondansetron Injectable 8 milliGRAM(s) IV Push every 8 hours  pantoprazole    Tablet 40 milliGRAM(s) Oral before breakfast  predniSONE   Tablet 100 milliGRAM(s) Oral daily  predniSONE   Tablet 100 milliGRAM(s) Oral every 24 hours  sodium chloride 0.9%. 1000 milliLiter(s) (100 mL/Hr) IV Continuous <Continuous>    MEDICATIONS  (PRN):  acetaminophen     Tablet .. 650 milliGRAM(s) Oral every 6 hours PRN PRN REACTION  acetaminophen     Tablet .. 650 milliGRAM(s) Oral every 6 hours PRN Temp greater or equal to 38C (100.4F), Mild Pain (1 - 3)  aluminum hydroxide/magnesium hydroxide/simethicone Suspension 30 milliLiter(s) Oral every 4 hours PRN Dyspepsia  diphenhydrAMINE Injectable 50 milliGRAM(s) IV Push once PRN REACTION  melatonin 3 milliGRAM(s) Oral at bedtime PRN Insomnia  ondansetron Injectable 4 milliGRAM(s) IV Push every 8 hours PRN Nausea and/or Vomiting      No Known Allergies      LABS:                        14.0   11.34 )-----------( 358      ( 01 Nov 2024 16:45 )             40.9     11-02    139  |  98  |  10  ----------------------------<  221[H]  3.8   |  24  |  0.45[L]    Ca    9.4      02 Nov 2024 09:05  Phos  3.1     11-02  Mg     1.90     11-02    TPro  7.8  /  Alb  3.8  /  TBili  0.5  /  DBili  x   /  AST  20  /  ALT  25  /  AlkPhos  94  11-01    Lactate Dehydrogenase, Serum: 407 U/L (11-02 @ 09:05)  Lactate Dehydrogenase, Serum: 433 U/L (11-02 @ 01:07)  Lactate Dehydrogenase, Serum: 449 U/L (11-01 @ 16:45)      RADIOLOGY & ADDITIONAL TESTS:  Studies reviewed.

## 2024-11-02 NOTE — PROGRESS NOTE ADULT - ASSESSMENT
58F with HTN presents to ER after her pathology from prior admission raised concern for lymphoma, which given her airway concerns and this disease would necessitate initiation of treatment inpatient urgently.    #T Cell Lymphoma -- high suspicion for T-cell lymphoma, awaiting further staining   Patient without immediate need for intubation but if breathing or swallowing capacity worsens, low threshold for ICU consultation.   Staging CT C/A/P during prior admission on 10/25/24 - Left supraclavicular and bilateral axillary lymphadenopathy. Slightly prominent lymph nodes in the abdomen and pelvis. No splenomegaly. Malignancy is possible.  TTE reviewed 11/2024: EF 66%, mild MR    RECOMMENDATIONS  - Started on C1D1 today 11/2/24 with CHP (cyclophosphamide, doxorubicin, and prednisone). Consent obtained and sent to chemo nurse. Order also sent to [facility] Pharmacist Oncology and chemo nurse.  >> Cyclophosphamide 750mg/m2/dose x1 on 11/2/24   >> Doxorubicin 50mg/m2/dose x1 on 11/2/24   >> Prednisone 100mg x5 days starting on 11/1/24   >> Plan to start neupogen 480mcg daily starting on 11/3/24 x5 days. Give first dose at least 24 hours after chemotherapy completed.   - If creatinine worsening, low threshold to start rasburicase, consult nephro, and notify hematology (even if overnight)  - Please obtain CBC, CMP, Mg, Phos, LDH, Uric Acid, G6PD  - Monitor TLS Labs Q8H: BMP+Mg+Phos, LDH, Uric Acid  - C/w Prednisone 100mg PO QD x5 days   - C/w Allopurinol 300mg PO QD (If Creatinine clearance low or Cr > 1.5: Allopurinol 100mg PO QD)  - If uric acid > 8, give 3 mg IV rasburicase, and if uric acid > 12 give 6 mg rasburicase  - Patient is a high aspiration risk, if patient unable to swallow solid foods, can give small and bite sized foods. Hopefully, with treatment the tumor will shrink and she will soon be able to swallow solid foods without problems.  - c/w GI ppx for steroids   - Awaiting final path results; hematology will follow and when ready for discharge will set up with hematologist for continued care @ [facility].      Note not finalized until signed by attending.   Please do not hesitate to page with questions.     Jania Spencer MD  Hematology/Oncology Fellow PGY5  Available on [employer] Teams   Pager: [phone]  For weekends and evenings (5 pm - 8 am), please page fellow on call.  58F with HTN presents to ER after her pathology from prior admission raised concern for lymphoma, which given her airway concerns and this disease would necessitate initiation of treatment inpatient urgently.    #T Cell Lymphoma -- high suspicion for T-cell lymphoma, awaiting further staining   Patient without immediate need for intubation but if breathing or swallowing capacity worsens, low threshold for ICU consultation.   Staging CT C/A/P during prior admission on 10/25/24 - Left supraclavicular and bilateral axillary lymphadenopathy. Slightly prominent lymph nodes in the abdomen and pelvis. No splenomegaly. Malignancy is possible.  TTE reviewed 11/2024: EF 66%, mild MR    RECOMMENDATIONS  - Started on C1D1 today 11/2/24 with CHP (cyclophosphamide, doxorubicin, and prednisone). Consent obtained and sent to chemo nurse. Order also sent to [facility] Pharmacist Oncology and chemo nurse.  >> Cyclophosphamide 750mg/m2/dose x1 on 11/2/24   >> Doxorubicin 50mg/m2/dose x1 on 11/2/24   >> Prednisone 100mg x5 days starting on 11/1/24   >> anticipate addition of Brentuximab-Vedotin once pathology confirms T-cell origin of her lymphoma; additional studies have been ordered and pending  >> Plan to start neupogen 480mcg daily starting on 11/3/24 x5 days. Give first dose at least 24 hours after chemotherapy completed.   - If creatinine worsening, low threshold to start rasburicase, consult nephro, and notify hematology (even if overnight)  - Please obtain CBC, CMP, Mg, Phos, LDH, Uric Acid, G6PD  - Monitor TLS Labs Q8H: BMP+Mg+Phos, LDH, Uric Acid  - C/w Prednisone 100mg PO QD x5 days (11/1/24 -->11/5/24)  - C/w Allopurinol 300mg PO QD (If Creatinine clearance low or Cr > 1.5: Allopurinol 100mg PO QD)  - If uric acid > 8, give 3 mg IV rasburicase, and if uric acid > 12 give 6 mg rasburicase  - Patient is a high aspiration risk, if patient unable to swallow solid foods, can give small and bite sized foods. Hopefully, with treatment the tumor will shrink and she will soon be able to swallow solid foods without problems.  - c/w GI ppx for steroids   -hematology will follow and when ready for discharge will set up with Dr. Womack for continued care @ [facility].      Note not finalized until signed by attending.   Please do not hesitate to page with questions.     Jania Spencer MD  Hematology/Oncology Fellow PGY5  Available on [employer] Teams   Pager: [phone]  For weekends and evenings (5 pm - 8 am), please page fellow on call.

## 2024-11-02 NOTE — PROGRESS NOTE ADULT - SUBJECTIVE AND OBJECTIVE BOX
[facility]    PATIENT'S NAME: Arabella Ortega   ATTENDING PHYSICIAN: Brennen Prieto DO   OPERATING PHYSICIAN: Vinay Egan MD   PATIENT ACCOUNT#:   [id number]    LOCATION:  [address] [place] [address] #:   [id number]       DATE OF WILBERTO procedure will be dictated by Dr. Loulou Ricardo. Dictated By Cayetano Paul MD  d: 01/28/2020 08:44:31  t: 01/28/2020 11:15:07  [facility] [id number]  [facility]/ PROGRESS NOTE:     Patient is a 58y old  Female who presents with a chief complaint of initiation of Chemotherapy for possible T- Cell lymphoma (02 Nov 2024 12:13)      SUBJECTIVE / OVERNIGHT EVENTS: awaiting chemo initiation     ADDITIONAL REVIEW OF SYSTEMS:    MEDICATIONS  (STANDING):  allopurinol 300 milliGRAM(s) Oral daily  amLODIPine   Tablet 5 milliGRAM(s) Oral with breakfast  enoxaparin Injectable 40 milliGRAM(s) SubCutaneous every 24 hours  ondansetron Injectable 8 milliGRAM(s) IV Push every 8 hours  pantoprazole    Tablet 40 milliGRAM(s) Oral before breakfast  predniSONE   Tablet 100 milliGRAM(s) Oral daily  sodium chloride 0.9%. 1000 milliLiter(s) (100 mL/Hr) IV Continuous <Continuous>    MEDICATIONS  (PRN):  acetaminophen     Tablet .. 650 milliGRAM(s) Oral every 6 hours PRN Temp greater or equal to 38C (100.4F), Mild Pain (1 - 3)  acetaminophen     Tablet .. 650 milliGRAM(s) Oral every 6 hours PRN PRN REACTION  aluminum hydroxide/magnesium hydroxide/simethicone Suspension 30 milliLiter(s) Oral every 4 hours PRN Dyspepsia  diphenhydrAMINE Injectable 50 milliGRAM(s) IV Push once PRN REACTION  melatonin 3 milliGRAM(s) Oral at bedtime PRN Insomnia  ondansetron Injectable 4 milliGRAM(s) IV Push every 8 hours PRN Nausea and/or Vomiting      CAPILLARY BLOOD GLUCOSE        I&O's Summary      PHYSICAL EXAM:  Vital Signs Last 24 Hrs  T(C): 36.6 (02 Nov 2024 14:30), Max: 36.9 (02 Nov 2024 05:51)  T(F): 97.9 (02 Nov 2024 14:30), Max: 98.4 (02 Nov 2024 05:51)  HR: 85 (02 Nov 2024 14:30) (78 - 109)  BP: 149/67 (02 Nov 2024 14:30) (133/64 - 164/89)  BP(mean): --  RR: 17 (02 Nov 2024 14:30) (17 - 21)  SpO2: 99% (02 Nov 2024 14:30) (95% - 100%)    Parameters below as of 02 Nov 2024 14:30  Patient On (Oxygen Delivery Method): room air        CONSTITUTIONAL: NAD; Firm L cervical mass, large palatine tonsil mass  RESPIRATORY: Normal respiratory effort; lungs are clear to auscultation bilaterally  CARDIOVASCULAR: Regular rate and rhythm, normal S1 and S2, no murmur/rub/gallop; No lower extremity edema;   ABDOMEN: Nontender to palpation, normoactive bowel sounds, no rebound/guarding;   PSYCH: A+O to person, place, and time; affect appropriate    LABS:                        14.0   11.34 )-----------( 358      ( 01 Nov 2024 16:45 )             40.9     11-02    139  |  98  |  10  ----------------------------<  221[H]  3.8   |  24  |  0.45[L]    Ca    9.4      02 Nov 2024 09:05  Phos  3.1     11-02  Mg     1.90     11-02    TPro  7.8  /  Alb  3.8  /  TBili  0.5  /  DBili  x   /  AST  20  /  ALT  25  /  AlkPhos  94  11-01          Urinalysis Basic - ( 02 Nov 2024 09:05 )    Color: x / Appearance: x / SG: x / pH: x  Gluc: 221 mg/dL / Ketone: x  / Bili: x / Urobili: x   Blood: x / Protein: x / Nitrite: x   Leuk Esterase: x / RBC: x / WBC x   Sq Epi: x / Non Sq Epi: x / Bacteria: x          RADIOLOGY & ADDITIONAL TESTS:  Results Reviewed:   Imaging Personally Reviewed:  Electrocardiogram Personally Reviewed:    COORDINATION OF CARE:  Care Discussed with Consultants/Other Providers [Y/N]:  Prior or Outpatient Records Reviewed [Y/N]:

## 2024-11-03 DIAGNOSIS — E11.9 TYPE 2 DIABETES MELLITUS WITHOUT COMPLICATIONS: ICD-10-CM

## 2024-11-03 LAB
ALBUMIN SERPL ELPH-MCNC: 3.4 G/DL — SIGNIFICANT CHANGE UP (ref 3.3–5)
ALP SERPL-CCNC: 87 U/L — SIGNIFICANT CHANGE UP (ref 40–120)
ALT FLD-CCNC: 21 U/L — SIGNIFICANT CHANGE UP (ref 4–33)
ANION GAP SERPL CALC-SCNC: 11 MMOL/L — SIGNIFICANT CHANGE UP (ref 7–14)
ANION GAP SERPL CALC-SCNC: 15 MMOL/L — HIGH (ref 7–14)
ANION GAP SERPL CALC-SCNC: 16 MMOL/L — HIGH (ref 7–14)
APPEARANCE UR: CLEAR — SIGNIFICANT CHANGE UP
AST SERPL-CCNC: 12 U/L — SIGNIFICANT CHANGE UP (ref 4–32)
BASOPHILS # BLD AUTO: 0.02 K/UL — SIGNIFICANT CHANGE UP (ref 0–0.2)
BASOPHILS NFR BLD AUTO: 0.2 % — SIGNIFICANT CHANGE UP (ref 0–2)
BILIRUB SERPL-MCNC: 0.4 MG/DL — SIGNIFICANT CHANGE UP (ref 0.2–1.2)
BILIRUB UR-MCNC: NEGATIVE — SIGNIFICANT CHANGE UP
BUN SERPL-MCNC: 10 MG/DL — SIGNIFICANT CHANGE UP (ref 7–23)
BUN SERPL-MCNC: 10 MG/DL — SIGNIFICANT CHANGE UP (ref 7–23)
BUN SERPL-MCNC: 9 MG/DL — SIGNIFICANT CHANGE UP (ref 7–23)
CALCIUM SERPL-MCNC: 9.3 MG/DL — SIGNIFICANT CHANGE UP (ref 8.4–10.5)
CALCIUM SERPL-MCNC: 9.5 MG/DL — SIGNIFICANT CHANGE UP (ref 8.4–10.5)
CALCIUM SERPL-MCNC: 9.5 MG/DL — SIGNIFICANT CHANGE UP (ref 8.4–10.5)
CHLORIDE SERPL-SCNC: 104 MMOL/L — SIGNIFICANT CHANGE UP (ref 98–107)
CHLORIDE SERPL-SCNC: 98 MMOL/L — SIGNIFICANT CHANGE UP (ref 98–107)
CHLORIDE SERPL-SCNC: 99 MMOL/L — SIGNIFICANT CHANGE UP (ref 98–107)
CO2 SERPL-SCNC: 23 MMOL/L — SIGNIFICANT CHANGE UP (ref 22–31)
CO2 SERPL-SCNC: 24 MMOL/L — SIGNIFICANT CHANGE UP (ref 22–31)
CO2 SERPL-SCNC: 27 MMOL/L — SIGNIFICANT CHANGE UP (ref 22–31)
COLOR SPEC: YELLOW — SIGNIFICANT CHANGE UP
CREAT SERPL-MCNC: 0.46 MG/DL — LOW (ref 0.5–1.3)
CREAT SERPL-MCNC: 0.47 MG/DL — LOW (ref 0.5–1.3)
CREAT SERPL-MCNC: 0.54 MG/DL — SIGNIFICANT CHANGE UP (ref 0.5–1.3)
DIFF PNL FLD: NEGATIVE — SIGNIFICANT CHANGE UP
EGFR: 107 ML/MIN/1.73M2 — SIGNIFICANT CHANGE UP
EGFR: 111 ML/MIN/1.73M2 — SIGNIFICANT CHANGE UP
EGFR: 112 ML/MIN/1.73M2 — SIGNIFICANT CHANGE UP
EOSINOPHIL # BLD AUTO: 0 K/UL — SIGNIFICANT CHANGE UP (ref 0–0.5)
EOSINOPHIL NFR BLD AUTO: 0 % — SIGNIFICANT CHANGE UP (ref 0–6)
GLUCOSE BLDC GLUCOMTR-MCNC: 138 MG/DL — HIGH (ref 70–99)
GLUCOSE BLDC GLUCOMTR-MCNC: 198 MG/DL — HIGH (ref 70–99)
GLUCOSE BLDC GLUCOMTR-MCNC: 215 MG/DL — HIGH (ref 70–99)
GLUCOSE BLDC GLUCOMTR-MCNC: 229 MG/DL — HIGH (ref 70–99)
GLUCOSE SERPL-MCNC: 147 MG/DL — HIGH (ref 70–99)
GLUCOSE SERPL-MCNC: 205 MG/DL — HIGH (ref 70–99)
GLUCOSE SERPL-MCNC: 263 MG/DL — HIGH (ref 70–99)
GLUCOSE UR QL: 500 MG/DL
HCT VFR BLD CALC: 39.8 % — SIGNIFICANT CHANGE UP (ref 34.5–45)
HCT VFR BLD CALC: 40 % — SIGNIFICANT CHANGE UP (ref 34.5–45)
HGB BLD-MCNC: 13.4 G/DL — SIGNIFICANT CHANGE UP (ref 11.5–15.5)
HGB BLD-MCNC: 13.6 G/DL — SIGNIFICANT CHANGE UP (ref 11.5–15.5)
IANC: 11.23 K/UL — HIGH (ref 1.8–7.4)
IMM GRANULOCYTES NFR BLD AUTO: 0.6 % — SIGNIFICANT CHANGE UP (ref 0–0.9)
KETONES UR-MCNC: 15 MG/DL
LDH SERPL L TO P-CCNC: 421 U/L — HIGH (ref 135–225)
LDH SERPL L TO P-CCNC: 456 U/L — HIGH (ref 135–225)
LEUKOCYTE ESTERASE UR-ACNC: ABNORMAL
LYMPHOCYTES # BLD AUTO: 0.45 K/UL — LOW (ref 1–3.3)
LYMPHOCYTES # BLD AUTO: 3.6 % — LOW (ref 13–44)
MAGNESIUM SERPL-MCNC: 2.1 MG/DL — SIGNIFICANT CHANGE UP (ref 1.6–2.6)
MCHC RBC-ENTMCNC: 25.7 PG — LOW (ref 27–34)
MCHC RBC-ENTMCNC: 25.7 PG — LOW (ref 27–34)
MCHC RBC-ENTMCNC: 33.5 G/DL — SIGNIFICANT CHANGE UP (ref 32–36)
MCHC RBC-ENTMCNC: 34.2 G/DL — SIGNIFICANT CHANGE UP (ref 32–36)
MCV RBC AUTO: 75.2 FL — LOW (ref 80–100)
MCV RBC AUTO: 76.6 FL — LOW (ref 80–100)
MONOCYTES # BLD AUTO: 0.62 K/UL — SIGNIFICANT CHANGE UP (ref 0–0.9)
MONOCYTES NFR BLD AUTO: 5 % — SIGNIFICANT CHANGE UP (ref 2–14)
NEUTROPHILS # BLD AUTO: 11.23 K/UL — HIGH (ref 1.8–7.4)
NEUTROPHILS NFR BLD AUTO: 90.6 % — HIGH (ref 43–77)
NITRITE UR-MCNC: NEGATIVE — SIGNIFICANT CHANGE UP
NRBC # BLD: 0 /100 WBCS — SIGNIFICANT CHANGE UP (ref 0–0)
NRBC # BLD: 0 /100 WBCS — SIGNIFICANT CHANGE UP (ref 0–0)
NRBC # FLD: 0 K/UL — SIGNIFICANT CHANGE UP (ref 0–0)
NRBC # FLD: 0 K/UL — SIGNIFICANT CHANGE UP (ref 0–0)
PH UR: 6 — SIGNIFICANT CHANGE UP (ref 5–8)
PHOSPHATE SERPL-MCNC: 2.6 MG/DL — SIGNIFICANT CHANGE UP (ref 2.5–4.5)
PHOSPHATE SERPL-MCNC: 2.8 MG/DL — SIGNIFICANT CHANGE UP (ref 2.5–4.5)
PHOSPHATE SERPL-MCNC: 3.4 MG/DL — SIGNIFICANT CHANGE UP (ref 2.5–4.5)
PLATELET # BLD AUTO: 342 K/UL — SIGNIFICANT CHANGE UP (ref 150–400)
PLATELET # BLD AUTO: 372 K/UL — SIGNIFICANT CHANGE UP (ref 150–400)
POTASSIUM SERPL-MCNC: 3.5 MMOL/L — SIGNIFICANT CHANGE UP (ref 3.5–5.3)
POTASSIUM SERPL-MCNC: 3.7 MMOL/L — SIGNIFICANT CHANGE UP (ref 3.5–5.3)
POTASSIUM SERPL-MCNC: 3.9 MMOL/L — SIGNIFICANT CHANGE UP (ref 3.5–5.3)
POTASSIUM SERPL-SCNC: 3.5 MMOL/L — SIGNIFICANT CHANGE UP (ref 3.5–5.3)
POTASSIUM SERPL-SCNC: 3.7 MMOL/L — SIGNIFICANT CHANGE UP (ref 3.5–5.3)
POTASSIUM SERPL-SCNC: 3.9 MMOL/L — SIGNIFICANT CHANGE UP (ref 3.5–5.3)
PROT SERPL-MCNC: 6.6 G/DL — SIGNIFICANT CHANGE UP (ref 6–8.3)
PROT UR-MCNC: NEGATIVE MG/DL — SIGNIFICANT CHANGE UP
RBC # BLD: 5.22 M/UL — HIGH (ref 3.8–5.2)
RBC # BLD: 5.29 M/UL — HIGH (ref 3.8–5.2)
RBC # FLD: 13.7 % — SIGNIFICANT CHANGE UP (ref 10.3–14.5)
RBC # FLD: 14.1 % — SIGNIFICANT CHANGE UP (ref 10.3–14.5)
SODIUM SERPL-SCNC: 137 MMOL/L — SIGNIFICANT CHANGE UP (ref 135–145)
SODIUM SERPL-SCNC: 138 MMOL/L — SIGNIFICANT CHANGE UP (ref 135–145)
SODIUM SERPL-SCNC: 142 MMOL/L — SIGNIFICANT CHANGE UP (ref 135–145)
SP GR SPEC: 1.01 — SIGNIFICANT CHANGE UP (ref 1–1.03)
URATE SERPL-MCNC: 4.2 MG/DL — SIGNIFICANT CHANGE UP (ref 2.5–7)
URATE SERPL-MCNC: 4.9 MG/DL — SIGNIFICANT CHANGE UP (ref 2.5–7)
UROBILINOGEN FLD QL: 1 MG/DL — SIGNIFICANT CHANGE UP (ref 0.2–1)
WBC # BLD: 12.39 K/UL — HIGH (ref 3.8–10.5)
WBC # BLD: 14.17 K/UL — HIGH (ref 3.8–10.5)
WBC # FLD AUTO: 12.39 K/UL — HIGH (ref 3.8–10.5)
WBC # FLD AUTO: 14.17 K/UL — HIGH (ref 3.8–10.5)

## 2024-11-03 PROCEDURE — 99233 SBSQ HOSP IP/OBS HIGH 50: CPT

## 2024-11-03 PROCEDURE — 99223 1ST HOSP IP/OBS HIGH 75: CPT

## 2024-11-03 RX ORDER — FILGRASTIM 480 UG/1.6ML
480 INJECTION, SOLUTION INTRAVENOUS; SUBCUTANEOUS EVERY 24 HOURS
Refills: 0 | Status: DISCONTINUED | OUTPATIENT
Start: 2024-11-03 | End: 2024-11-04

## 2024-11-03 RX ORDER — INSULIN LISPRO 100/ML
10 VIAL (ML) SUBCUTANEOUS
Refills: 0 | Status: DISCONTINUED | OUTPATIENT
Start: 2024-11-03 | End: 2024-11-04

## 2024-11-03 RX ORDER — INSULIN LISPRO 100/ML
VIAL (ML) SUBCUTANEOUS
Refills: 0 | Status: COMPLETED | OUTPATIENT
Start: 2024-11-03 | End: 2024-11-05

## 2024-11-03 RX ORDER — CHLORHEXIDINE GLUCONATE 40 MG/ML
1 SOLUTION TOPICAL DAILY
Refills: 0 | Status: DISCONTINUED | OUTPATIENT
Start: 2024-11-03 | End: 2024-11-07

## 2024-11-03 RX ORDER — INSULIN LISPRO 100/ML
VIAL (ML) SUBCUTANEOUS AT BEDTIME
Refills: 0 | Status: COMPLETED | OUTPATIENT
Start: 2024-11-03 | End: 2024-11-05

## 2024-11-03 RX ORDER — INSULIN GLARGINE,HUM.REC.ANLOG 100/ML
20 VIAL (ML) SUBCUTANEOUS AT BEDTIME
Refills: 0 | Status: DISCONTINUED | OUTPATIENT
Start: 2024-11-03 | End: 2024-11-06

## 2024-11-03 RX ADMIN — Medication 10 UNIT(S): at 12:39

## 2024-11-03 RX ADMIN — Medication 300 MILLIGRAM(S): at 12:37

## 2024-11-03 RX ADMIN — Medication 40 MILLIGRAM(S): at 18:45

## 2024-11-03 RX ADMIN — Medication 4: at 12:39

## 2024-11-03 RX ADMIN — Medication 2: at 18:44

## 2024-11-03 RX ADMIN — CHLORHEXIDINE GLUCONATE 1 APPLICATION(S): 40 SOLUTION TOPICAL at 22:37

## 2024-11-03 RX ADMIN — ONDANSETRON HYDROCHLORIDE 8 MILLIGRAM(S): 2 INJECTION, SOLUTION INTRAMUSCULAR; INTRAVENOUS at 02:25

## 2024-11-03 RX ADMIN — ONDANSETRON HYDROCHLORIDE 8 MILLIGRAM(S): 2 INJECTION, SOLUTION INTRAMUSCULAR; INTRAVENOUS at 12:42

## 2024-11-03 RX ADMIN — Medication 10 UNIT(S): at 08:46

## 2024-11-03 RX ADMIN — Medication 100 MILLIGRAM(S): at 05:26

## 2024-11-03 RX ADMIN — Medication 5 MILLIGRAM(S): at 08:43

## 2024-11-03 RX ADMIN — FILGRASTIM 480 MICROGRAM(S): 480 INJECTION, SOLUTION INTRAVENOUS; SUBCUTANEOUS at 13:44

## 2024-11-03 RX ADMIN — Medication 20 UNIT(S): at 22:36

## 2024-11-03 RX ADMIN — Medication 4: at 08:43

## 2024-11-03 RX ADMIN — SODIUM CHLORIDE 100 MILLILITER(S): 9 INJECTION, SOLUTION INTRAMUSCULAR; INTRAVENOUS; SUBCUTANEOUS at 12:42

## 2024-11-03 RX ADMIN — PANTOPRAZOLE SODIUM 40 MILLIGRAM(S): 40 TABLET, DELAYED RELEASE ORAL at 05:25

## 2024-11-03 RX ADMIN — ONDANSETRON HYDROCHLORIDE 4 MILLIGRAM(S): 2 INJECTION, SOLUTION INTRAMUSCULAR; INTRAVENOUS at 05:25

## 2024-11-03 NOTE — PROGRESS NOTE ADULT - SUBJECTIVE AND OBJECTIVE BOX
PROGRESS NOTE:     Patient is a 58y old  Female who presents with a chief complaint of initiation of Chemotherapy for possible T- Cell lymphoma (03 Nov 2024 10:29)      SUBJECTIVE / OVERNIGHT EVENTS: feels better today, less swelling on L neck    ADDITIONAL REVIEW OF SYSTEMS:    MEDICATIONS  (STANDING):  allopurinol 300 milliGRAM(s) Oral daily  amLODIPine   Tablet 5 milliGRAM(s) Oral with breakfast  dextrose 5%. 1000 milliLiter(s) (50 mL/Hr) IV Continuous <Continuous>  dextrose 5%. 1000 milliLiter(s) (100 mL/Hr) IV Continuous <Continuous>  dextrose 50% Injectable 25 Gram(s) IV Push once  dextrose 50% Injectable 12.5 Gram(s) IV Push once  dextrose 50% Injectable 25 Gram(s) IV Push once  enoxaparin Injectable 40 milliGRAM(s) SubCutaneous every 24 hours  glucagon  Injectable 1 milliGRAM(s) IntraMuscular once  insulin glargine Injectable (LANTUS) 20 Unit(s) SubCutaneous at bedtime  insulin lispro (ADMELOG) corrective regimen sliding scale   SubCutaneous at bedtime  insulin lispro (ADMELOG) corrective regimen sliding scale   SubCutaneous three times a day before meals  insulin lispro Injectable (ADMELOG) 10 Unit(s) SubCutaneous three times a day before meals  ondansetron Injectable 8 milliGRAM(s) IV Push every 8 hours  pantoprazole    Tablet 40 milliGRAM(s) Oral before breakfast  predniSONE   Tablet 100 milliGRAM(s) Oral daily  sodium chloride 0.9%. 1000 milliLiter(s) (100 mL/Hr) IV Continuous <Continuous>    MEDICATIONS  (PRN):  acetaminophen     Tablet .. 650 milliGRAM(s) Oral every 6 hours PRN PRN REACTION  acetaminophen     Tablet .. 650 milliGRAM(s) Oral every 6 hours PRN Temp greater or equal to 38C (100.4F), Mild Pain (1 - 3)  aluminum hydroxide/magnesium hydroxide/simethicone Suspension 30 milliLiter(s) Oral every 4 hours PRN Dyspepsia  dextrose Oral Gel 15 Gram(s) Oral once PRN Blood Glucose LESS THAN 70 milliGRAM(s)/deciliter  diphenhydrAMINE Injectable 50 milliGRAM(s) IV Push once PRN REACTION  melatonin 3 milliGRAM(s) Oral at bedtime PRN Insomnia  ondansetron Injectable 4 milliGRAM(s) IV Push every 8 hours PRN Nausea and/or Vomiting      CAPILLARY BLOOD GLUCOSE      POCT Blood Glucose.: 215 mg/dL (03 Nov 2024 08:14)  POCT Blood Glucose.: 275 mg/dL (02 Nov 2024 21:16)  POCT Blood Glucose.: 267 mg/dL (02 Nov 2024 17:30)    I&O's Summary      PHYSICAL EXAM:  Vital Signs Last 24 Hrs  T(C): 36.5 (03 Nov 2024 05:10), Max: 36.8 (02 Nov 2024 21:46)  T(F): 97.7 (03 Nov 2024 05:10), Max: 98.3 (02 Nov 2024 21:46)  HR: 87 (03 Nov 2024 08:40) (66 - 87)  BP: 147/82 (03 Nov 2024 08:40) (145/64 - 154/84)  BP(mean): --  RR: 18 (03 Nov 2024 05:10) (17 - 18)  SpO2: 100% (03 Nov 2024 05:10) (98% - 100%)    Parameters below as of 03 Nov 2024 05:10  Patient On (Oxygen Delivery Method): room air        CONSTITUTIONAL: NAD, firm parotid/cervical mass, palatine tonsil mass  RESPIRATORY: Normal respiratory effort; lungs are clear to auscultation bilaterally  CARDIOVASCULAR: Regular rate and rhythm, normal S1 and S2, no murmur/rub/gallop;   ABDOMEN: Nontender to palpation, normoactive bowel sounds, no rebound/guarding  PSYCH: A+O to person, place, and time; affect appropriate    LABS:                        13.4   14.17 )-----------( 372      ( 03 Nov 2024 09:10 )             40.0     11-03    138  |  99  |  10  ----------------------------<  263[H]  3.9   |  24  |  0.46[L]    Ca    9.5      03 Nov 2024 09:10  Phos  2.8     11-03  Mg     2.10     11-03    TPro  7.8  /  Alb  3.8  /  TBili  0.5  /  DBili  x   /  AST  20  /  ALT  25  /  AlkPhos  94  11-01          Urinalysis Basic - ( 03 Nov 2024 09:10 )    Color: x / Appearance: x / SG: x / pH: x  Gluc: 263 mg/dL / Ketone: x  / Bili: x / Urobili: x   Blood: x / Protein: x / Nitrite: x   Leuk Esterase: x / RBC: x / WBC x   Sq Epi: x / Non Sq Epi: x / Bacteria: x          RADIOLOGY & ADDITIONAL TESTS:  Results Reviewed:   Imaging Personally Reviewed:  Electrocardiogram Personally Reviewed:    COORDINATION OF CARE:  Care Discussed with Consultants/Other Providers [Y/N]:  Prior or Outpatient Records Reviewed [Y/N]:

## 2024-11-03 NOTE — PROGRESS NOTE ADULT - ASSESSMENT
57 yo woman presented initially on 10/25/24 with large left sided neck/tonsillar mass and after biopsy by ENT was deemed stable by their service for discharge; path resulted as lymphoma (suspected T-Cell) and patient called back to return to hospital to initiate chemotherapy given location of her disease and concern for airway compromise.  - Echo done 11/1/24 - EF 66%; appreciate cardiology team    TREATMENT:  - Prednisone 100mg daily X5 days initiated 11/1/24  - completed Cytoxan/Adrimaycin 11/2/24 without incident  - plan to start growth factor support filgastrim at 480mcg daily for next 5 days.  - upon confirmation of T-cell origin by pathology, will add Brentuximab Vedotin (anticipate on 11/4/24)    - to monitor tumor lysis labs Q12 hours and monitor airway  - continue allopurinol 300mg daily to prevent renal complications; monitor for rash  - will follow closely.

## 2024-11-03 NOTE — CONSULT NOTE ADULT - ASSESSMENT
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 No previous h/o diabetes but baseline A1c 11.9 c/w undiagnosed diabetes, assumed to be Type 2. BS above target on current regimen. Prefer to target BS gen'l < 180, but no indication for "tight"  control.   Agree with basal bolus insulin for now, with higher ratio of prandial to basal due to steroid use.   Will titrate dose to BS response, too early for titration  Pt likely to be candidate for metformin and GLP1 when discharged.

## 2024-11-03 NOTE — PROGRESS NOTE ADULT - SUBJECTIVE AND OBJECTIVE BOX
Patient seen and examined;  Chart reviewed and events noted;   feels well today; has noted softer left neck mass  Reports that shew is having less difficulty swallowing    MEDICATIONS  (STANDING):  allopurinol 300 milliGRAM(s) Oral daily  amLODIPine   Tablet 5 milliGRAM(s) Oral with breakfast  dextrose 5%. 1000 milliLiter(s) (50 mL/Hr) IV Continuous <Continuous>  enoxaparin Injectable 40 milliGRAM(s) SubCutaneous every 24 hours  glucagon  Injectable 1 milliGRAM(s) IntraMuscular once  insulin glargine Injectable (LANTUS) 20 Unit(s) SubCutaneous at bedtime  insulin lispro (ADMELOG) corrective regimen sliding scale   SubCutaneous three times a day before meals  insulin lispro (ADMELOG) corrective regimen sliding scale   SubCutaneous at bedtime  insulin lispro Injectable (ADMELOG) 10 Unit(s) SubCutaneous three times a day before meals  ondansetron Injectable 8 milliGRAM(s) IV Push every 8 hours  pantoprazole    Tablet 40 milliGRAM(s) Oral before breakfast  predniSONE   Tablet 100 milliGRAM(s) Oral daily  sodium chloride 0.9%. 1000 milliLiter(s) (100 mL/Hr) IV Continuous <Continuous>    MEDICATIONS  (PRN):  acetaminophen     Tablet .. 650 milliGRAM(s) Oral every 6 hours PRN PRN REACTION  acetaminophen     Tablet .. 650 milliGRAM(s) Oral every 6 hours PRN Temp greater or equal to 38C (100.4F), Mild Pain (1 - 3)  aluminum hydroxide/magnesium hydroxide/simethicone Suspension 30 milliLiter(s) Oral every 4 hours PRN Dyspepsia  dextrose Oral Gel 15 Gram(s) Oral once PRN Blood Glucose LESS THAN 70 milliGRAM(s)/deciliter  diphenhydrAMINE Injectable 50 milliGRAM(s) IV Push once PRN REACTION  melatonin 3 milliGRAM(s) Oral at bedtime PRN Insomnia  ondansetron Injectable 4 milliGRAM(s) IV Push every 8 hours PRN Nausea and/or Vomiting      Vital Signs Last 24 Hrs  T(C): 36.5 (03 Nov 2024 05:10), Max: 36.8 (02 Nov 2024 21:46)  T(F): 97.7 (03 Nov 2024 05:10), Max: 98.3 (02 Nov 2024 21:46)  HR: 87 (03 Nov 2024 08:40) (66 - 87)  BP: 147/82 (03 Nov 2024 08:40) (145/64 - 154/84)  RR: 18 (03 Nov 2024 05:10) (17 - 18)  SpO2: 100% (03 Nov 2024 05:10) (98% - 100%)      PHYSICAL EXAM  General: adult in NAD  HEENT: left neck mass smaller in size (now measuring about 7cm with softer borders); tonsilar extension   CV: normal S1S2 with no murmur rubs or gallops  Lungs: clear to auscultation, no wheezes, no rhales  Abdomen: soft non-tender non-distended, no hepato/splenomegaly  Ext: no clubbing cyanosis or edema  Skin: no rashes and no petichiae  Neuro: alert and oriented X3 no focal deficits      LABS:                        13.4   14.17 )-----------( 372      ( 03 Nov 2024 09:10 )             40.0     11-03    138  |  99  |  10  ----------------------------<  263[H]  3.9   |  24  |  0.46[L]    Ca    9.5      03 Nov 2024 09:10  Phos  2.8     11-03  Mg     2.10     11-03    TPro  7.8  /  Alb  3.8  /  TBili  0.5  /  DBili  x   /  AST  20  /  ALT  25  /  AlkPhos  94  11-01

## 2024-11-03 NOTE — CONSULT NOTE ADULT - SUBJECTIVE AND OBJECTIVE BOX
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  - Echo done 11/1/24 - EF 66%; appreciate cardiology team    TREATMENT:  - Prednisone 100mg daily X5 days initiated 11/1/24  - completed Cytoxan/Adrimaycin 11/2/24 without incident  - plan to start growth factor support filgastrim at 480mcg daily for next 5 days.  - upon confirmation of T-cell origin by pathology, will add Brentuximab Vedotin (anticipate on 11/4/24)  Endocine: asked to see pt for hyperglycemia. No previous h/o diabetes, but pt has not had routine medical care. A1c 11.9 suggesting pre-existing diabetes, possibly worsened by steroid. Denies prior polyuria/ dipsia, wt loss.   Began glargine 20, premeal lispro 10 3 x d but BS still elevated.  PAST MEDICAL & SURGICAL HISTORY:  Tonsillar mass  Tongue lesion  Benign essential HTN  S/P appendectomy    FAMILY HISTORY:  No pertinent family history in first degree relatives  Social History: -cigs     MEDICATIONS  (STANDING):  allopurinol 300 milliGRAM(s) Oral daily  amLODIPine   Tablet 5 milliGRAM(s) Oral with breakfast  dextrose 5%. 1000 milliLiter(s) (50 mL/Hr) IV Continuous <Continuous>  dextrose 5%. 1000 milliLiter(s) (100 mL/Hr) IV Continuous <Continuous>  dextrose 50% Injectable 25 Gram(s) IV Push once  dextrose 50% Injectable 12.5 Gram(s) IV Push once  dextrose 50% Injectable 25 Gram(s) IV Push once  enoxaparin Injectable 40 milliGRAM(s) SubCutaneous every 24 hours  filgrastim-sndz (ZARXIO) Injectable 480 MICROGram(s) SubCutaneous every 24 hours  glucagon  Injectable 1 milliGRAM(s) IntraMuscular once  insulin glargine Injectable (LANTUS) 20 Unit(s) SubCutaneous at bedtime  insulin lispro (ADMELOG) corrective regimen sliding scale   SubCutaneous at bedtime  insulin lispro (ADMELOG) corrective regimen sliding scale   SubCutaneous three times a day before meals  insulin lispro Injectable (ADMELOG) 10 Unit(s) SubCutaneous three times a day before meals  pantoprazole    Tablet 40 milliGRAM(s) Oral before breakfast  predniSONE   Tablet 100 milliGRAM(s) Oral daily  sodium chloride 0.9%. 1000 milliLiter(s) (100 mL/Hr) IV Continuous <Continuous>    MEDICATIONS  (PRN):  acetaminophen     Tablet .. 650 milliGRAM(s) Oral every 6 hours PRN Temp greater or equal to 38C (100.4F), Mild Pain (1 - 3)  acetaminophen     Tablet .. 650 milliGRAM(s) Oral every 6 hours PRN PRN REACTION  aluminum hydroxide/magnesium hydroxide/simethicone Suspension 30 milliLiter(s) Oral every 4 hours PRN Dyspepsia  dextrose Oral Gel 15 Gram(s) Oral once PRN Blood Glucose LESS THAN 70 milliGRAM(s)/deciliter  diphenhydrAMINE Injectable 50 milliGRAM(s) IV Push once PRN REACTION  melatonin 3 milliGRAM(s) Oral at bedtime PRN Insomnia  ondansetron Injectable 4 milliGRAM(s) IV Push every 8 hours PRN Nausea and/or Vomiting      Allergies ..No Known Allergies       Review of Systems:  Constitutional: No fever  Eyes: No blurry vision  Neuro: No tremors  HEENT: No pain  Cardiovascular: No chest pain, palpitations  Respiratory:  c/o wheezing  GI: No nausea, vomiting, abdominal pain  : No dysuria  Skin: no rash     Endocrine: no polyuria, polydipsia  Hem/lymph: no swelling  Osteoporosis: no fractures   Has not had routine care such as Gyn f/u or mammo    PHYSICAL EXAM:  VITALS: T(C): 36.5 (11-03-24 @ 05:10)  T(F): 97.7 (11-03-24 @ 05:10), Max: 98.3 (11-02-24 @ 21:46)  HR: 87 (11-03-24 @ 08:40) (66 - 87)  BP: 147/82 (11-03-24 @ 08:40) (145/64 - 149/67)  RR:  (17 - 18)  SpO2:  (98% - 100%)  Wt(kg): --102.1  GENERAL: NAD, well-groomed, well-developed  EYES: No proptosis, no lid lag, anicteric     THYROID: Normal size, no palpable nodules  RESPIRATORY: Clear to auscultation bilaterally; No rales, rhonchi, wheezing, or rubs  CARDIOVASCULAR: Regular rate and rhythm; No murmurs; no peripheral edema  GI: Soft, nontender, non distended, normal bowel sounds  SKIN: Dry, intact, No rashes or lesions     NEURO: sensation intact, extraocular movements intact, no tremor, normal reflexes  PSYCH: Alert and oriented x 3, normal affect, normal mood  CUSHING'S SIGNS: no striae    POCT Blood Glucose.: 229 mg/dL (11-03-24 @ 12:21)  POCT Blood Glucose.: 215 mg/dL (11-03-24 @ 08:14)  POCT Blood Glucose.: 275 mg/dL (11-02-24 @ 21:16)  POCT Blood Glucose.: 267 mg/dL (11-02-24 @ 17:30)                            13.4   14.17 )-----------( 372      ( 03 Nov 2024 09:10 )             40.0       11-03    138  |  99  |  10  ----------------------------<  263[H]  3.9   |  24  |  0.46[L]    eGFR: 112    Ca    9.5      11-03  Mg     2.10     11-03  Phos  2.8     11-03    TPro  7.8  /  Alb  3.8  /  TBili  0.5  /  DBili  x   /  AST  20  /  ALT  25  /  AlkPhos  94  11-01      Thyroid Function Tests:              Radiology:

## 2024-11-04 ENCOUNTER — APPOINTMENT (OUTPATIENT)
Dept: HEMATOLOGY ONCOLOGY | Facility: CLINIC | Age: 59
End: 2024-11-04

## 2024-11-04 ENCOUNTER — APPOINTMENT (OUTPATIENT)
Dept: OTOLARYNGOLOGY | Facility: CLINIC | Age: 59
End: 2024-11-04

## 2024-11-04 DIAGNOSIS — Z29.9 ENCOUNTER FOR PROPHYLACTIC MEASURES, UNSPECIFIED: ICD-10-CM

## 2024-11-04 LAB
ALBUMIN SERPL ELPH-MCNC: 3.1 G/DL — LOW (ref 3.3–5)
ALBUMIN SERPL ELPH-MCNC: 3.5 G/DL — SIGNIFICANT CHANGE UP (ref 3.3–5)
ALP SERPL-CCNC: 106 U/L — SIGNIFICANT CHANGE UP (ref 40–120)
ALP SERPL-CCNC: 93 U/L — SIGNIFICANT CHANGE UP (ref 40–120)
ALT FLD-CCNC: 22 U/L — SIGNIFICANT CHANGE UP (ref 4–33)
ALT FLD-CCNC: 33 U/L — SIGNIFICANT CHANGE UP (ref 4–33)
ANION GAP SERPL CALC-SCNC: 14 MMOL/L — SIGNIFICANT CHANGE UP (ref 7–14)
ANION GAP SERPL CALC-SCNC: 9 MMOL/L — SIGNIFICANT CHANGE UP (ref 7–14)
AST SERPL-CCNC: 15 U/L — SIGNIFICANT CHANGE UP (ref 4–32)
AST SERPL-CCNC: 27 U/L — SIGNIFICANT CHANGE UP (ref 4–32)
BILIRUB SERPL-MCNC: 0.3 MG/DL — SIGNIFICANT CHANGE UP (ref 0.2–1.2)
BILIRUB SERPL-MCNC: 0.5 MG/DL — SIGNIFICANT CHANGE UP (ref 0.2–1.2)
BUN SERPL-MCNC: 10 MG/DL — SIGNIFICANT CHANGE UP (ref 7–23)
BUN SERPL-MCNC: 9 MG/DL — SIGNIFICANT CHANGE UP (ref 7–23)
CALCIUM SERPL-MCNC: 8.6 MG/DL — SIGNIFICANT CHANGE UP (ref 8.4–10.5)
CALCIUM SERPL-MCNC: 9 MG/DL — SIGNIFICANT CHANGE UP (ref 8.4–10.5)
CHLORIDE SERPL-SCNC: 101 MMOL/L — SIGNIFICANT CHANGE UP (ref 98–107)
CHLORIDE SERPL-SCNC: 102 MMOL/L — SIGNIFICANT CHANGE UP (ref 98–107)
CHOLEST SERPL-MCNC: 113 MG/DL — SIGNIFICANT CHANGE UP
CHROM ANALY OVERALL INTERP SPEC-IMP: SIGNIFICANT CHANGE UP
CO2 SERPL-SCNC: 25 MMOL/L — SIGNIFICANT CHANGE UP (ref 22–31)
CO2 SERPL-SCNC: 30 MMOL/L — SIGNIFICANT CHANGE UP (ref 22–31)
CREAT SERPL-MCNC: 0.52 MG/DL — SIGNIFICANT CHANGE UP (ref 0.5–1.3)
CREAT SERPL-MCNC: 0.64 MG/DL — SIGNIFICANT CHANGE UP (ref 0.5–1.3)
EGFR: 103 ML/MIN/1.73M2 — SIGNIFICANT CHANGE UP
EGFR: 108 ML/MIN/1.73M2 — SIGNIFICANT CHANGE UP
GLUCOSE BLDC GLUCOMTR-MCNC: 100 MG/DL — HIGH (ref 70–99)
GLUCOSE BLDC GLUCOMTR-MCNC: 110 MG/DL — HIGH (ref 70–99)
GLUCOSE BLDC GLUCOMTR-MCNC: 171 MG/DL — HIGH (ref 70–99)
GLUCOSE BLDC GLUCOMTR-MCNC: 216 MG/DL — HIGH (ref 70–99)
GLUCOSE SERPL-MCNC: 142 MG/DL — HIGH (ref 70–99)
GLUCOSE SERPL-MCNC: 259 MG/DL — HIGH (ref 70–99)
HCT VFR BLD CALC: 39.1 % — SIGNIFICANT CHANGE UP (ref 34.5–45)
HDLC SERPL-MCNC: 39 MG/DL — LOW
HGB BLD-MCNC: 13 G/DL — SIGNIFICANT CHANGE UP (ref 11.5–15.5)
LDH SERPL L TO P-CCNC: 373 U/L — HIGH (ref 135–225)
LDH SERPL L TO P-CCNC: 469 U/L — HIGH (ref 135–225)
LIPID PNL WITH DIRECT LDL SERPL: 58 MG/DL — SIGNIFICANT CHANGE UP
MAGNESIUM SERPL-MCNC: 2 MG/DL — SIGNIFICANT CHANGE UP (ref 1.6–2.6)
MAGNESIUM SERPL-MCNC: 2.1 MG/DL — SIGNIFICANT CHANGE UP (ref 1.6–2.6)
MCHC RBC-ENTMCNC: 25.5 PG — LOW (ref 27–34)
MCHC RBC-ENTMCNC: 33.2 G/DL — SIGNIFICANT CHANGE UP (ref 32–36)
MCV RBC AUTO: 76.8 FL — LOW (ref 80–100)
MRSA PCR RESULT.: SIGNIFICANT CHANGE UP
NON HDL CHOLESTEROL: 74 MG/DL — SIGNIFICANT CHANGE UP
NRBC # BLD: 0 /100 WBCS — SIGNIFICANT CHANGE UP (ref 0–0)
NRBC # FLD: 0 K/UL — SIGNIFICANT CHANGE UP (ref 0–0)
PHOSPHATE SERPL-MCNC: 2.8 MG/DL — SIGNIFICANT CHANGE UP (ref 2.5–4.5)
PHOSPHATE SERPL-MCNC: 2.8 MG/DL — SIGNIFICANT CHANGE UP (ref 2.5–4.5)
PLATELET # BLD AUTO: 357 K/UL — SIGNIFICANT CHANGE UP (ref 150–400)
POTASSIUM SERPL-MCNC: 3.3 MMOL/L — LOW (ref 3.5–5.3)
POTASSIUM SERPL-MCNC: 3.5 MMOL/L — SIGNIFICANT CHANGE UP (ref 3.5–5.3)
POTASSIUM SERPL-SCNC: 3.3 MMOL/L — LOW (ref 3.5–5.3)
POTASSIUM SERPL-SCNC: 3.5 MMOL/L — SIGNIFICANT CHANGE UP (ref 3.5–5.3)
PROT SERPL-MCNC: 6 G/DL — SIGNIFICANT CHANGE UP (ref 6–8.3)
PROT SERPL-MCNC: 6.9 G/DL — SIGNIFICANT CHANGE UP (ref 6–8.3)
RBC # BLD: 5.09 M/UL — SIGNIFICANT CHANGE UP (ref 3.8–5.2)
RBC # FLD: 14.3 % — SIGNIFICANT CHANGE UP (ref 10.3–14.5)
S AUREUS DNA NOSE QL NAA+PROBE: SIGNIFICANT CHANGE UP
SODIUM SERPL-SCNC: 140 MMOL/L — SIGNIFICANT CHANGE UP (ref 135–145)
SODIUM SERPL-SCNC: 141 MMOL/L — SIGNIFICANT CHANGE UP (ref 135–145)
TRIGL SERPL-MCNC: 82 MG/DL — SIGNIFICANT CHANGE UP
URATE SERPL-MCNC: 3.3 MG/DL — SIGNIFICANT CHANGE UP (ref 2.5–7)
URATE SERPL-MCNC: 4.1 MG/DL — SIGNIFICANT CHANGE UP (ref 2.5–7)
WBC # BLD: 38.42 K/UL — HIGH (ref 3.8–10.5)
WBC # FLD AUTO: 38.42 K/UL — HIGH (ref 3.8–10.5)

## 2024-11-04 PROCEDURE — 99232 SBSQ HOSP IP/OBS MODERATE 35: CPT

## 2024-11-04 PROCEDURE — 99233 SBSQ HOSP IP/OBS HIGH 50: CPT

## 2024-11-04 PROCEDURE — 99233 SBSQ HOSP IP/OBS HIGH 50: CPT | Mod: GC

## 2024-11-04 RX ORDER — SEMAGLUTIDE 1.34 MG/ML
0.25 INJECTION, SOLUTION SUBCUTANEOUS
Qty: 2 | Refills: 0
Start: 2024-11-04 | End: 2024-12-03

## 2024-11-04 RX ORDER — INSULIN LISPRO 100/ML
11 VIAL (ML) SUBCUTANEOUS
Refills: 0 | Status: DISCONTINUED | OUTPATIENT
Start: 2024-11-04 | End: 2024-11-05

## 2024-11-04 RX ORDER — POTASSIUM CHLORIDE 10 MEQ
20 TABLET, EXTENDED RELEASE ORAL ONCE
Refills: 0 | Status: COMPLETED | OUTPATIENT
Start: 2024-11-04 | End: 2024-11-04

## 2024-11-04 RX ADMIN — FILGRASTIM 480 MICROGRAM(S): 480 INJECTION, SOLUTION INTRAVENOUS; SUBCUTANEOUS at 13:12

## 2024-11-04 RX ADMIN — Medication 5 MILLIGRAM(S): at 08:51

## 2024-11-04 RX ADMIN — Medication 10 UNIT(S): at 08:49

## 2024-11-04 RX ADMIN — Medication 4: at 12:38

## 2024-11-04 RX ADMIN — Medication 20 UNIT(S): at 22:48

## 2024-11-04 RX ADMIN — Medication 11 UNIT(S): at 18:29

## 2024-11-04 RX ADMIN — SODIUM CHLORIDE 100 MILLILITER(S): 9 INJECTION, SOLUTION INTRAMUSCULAR; INTRAVENOUS; SUBCUTANEOUS at 18:28

## 2024-11-04 RX ADMIN — Medication 10 UNIT(S): at 12:37

## 2024-11-04 RX ADMIN — Medication 100 MILLIGRAM(S): at 05:15

## 2024-11-04 RX ADMIN — PANTOPRAZOLE SODIUM 40 MILLIGRAM(S): 40 TABLET, DELAYED RELEASE ORAL at 05:15

## 2024-11-04 RX ADMIN — Medication 20 MILLIEQUIVALENT(S): at 22:48

## 2024-11-04 RX ADMIN — Medication 40 MILLIGRAM(S): at 18:28

## 2024-11-04 RX ADMIN — Medication 300 MILLIGRAM(S): at 12:37

## 2024-11-04 RX ADMIN — CHLORHEXIDINE GLUCONATE 1 APPLICATION(S): 40 SOLUTION TOPICAL at 12:39

## 2024-11-04 RX ADMIN — Medication 2: at 08:50

## 2024-11-04 NOTE — DIETITIAN INITIAL EVALUATION ADULT - PERTINENT MEDS FT
MEDICATIONS  (STANDING):  allopurinol 300 milliGRAM(s) Oral daily  amLODIPine   Tablet 5 milliGRAM(s) Oral with breakfast  chlorhexidine 2% Cloths 1 Application(s) Topical daily  dextrose 5%. 1000 milliLiter(s) (100 mL/Hr) IV Continuous <Continuous>  dextrose 5%. 1000 milliLiter(s) (50 mL/Hr) IV Continuous <Continuous>  dextrose 50% Injectable 25 Gram(s) IV Push once  dextrose 50% Injectable 12.5 Gram(s) IV Push once  dextrose 50% Injectable 25 Gram(s) IV Push once  enoxaparin Injectable 40 milliGRAM(s) SubCutaneous every 24 hours  filgrastim-sndz (ZARXIO) Injectable 480 MICROGram(s) SubCutaneous every 24 hours  glucagon  Injectable 1 milliGRAM(s) IntraMuscular once  insulin glargine Injectable (LANTUS) 20 Unit(s) SubCutaneous at bedtime  insulin lispro (ADMELOG) corrective regimen sliding scale   SubCutaneous at bedtime  insulin lispro (ADMELOG) corrective regimen sliding scale   SubCutaneous three times a day before meals  insulin lispro Injectable (ADMELOG) 10 Unit(s) SubCutaneous three times a day before meals  pantoprazole    Tablet 40 milliGRAM(s) Oral before breakfast  predniSONE   Tablet 100 milliGRAM(s) Oral daily  sodium chloride 0.9%. 1000 milliLiter(s) (100 mL/Hr) IV Continuous <Continuous>    MEDICATIONS  (PRN):  acetaminophen     Tablet .. 650 milliGRAM(s) Oral every 6 hours PRN PRN REACTION  acetaminophen     Tablet .. 650 milliGRAM(s) Oral every 6 hours PRN Temp greater or equal to 38C (100.4F), Mild Pain (1 - 3)  aluminum hydroxide/magnesium hydroxide/simethicone Suspension 30 milliLiter(s) Oral every 4 hours PRN Dyspepsia  dextrose Oral Gel 15 Gram(s) Oral once PRN Blood Glucose LESS THAN 70 milliGRAM(s)/deciliter  diphenhydrAMINE Injectable 50 milliGRAM(s) IV Push once PRN REACTION  melatonin 3 milliGRAM(s) Oral at bedtime PRN Insomnia  ondansetron Injectable 4 milliGRAM(s) IV Push every 8 hours PRN Nausea and/or Vomiting

## 2024-11-04 NOTE — DIETITIAN INITIAL EVALUATION ADULT - REASON FOR ADMISSION
58 yr old female with a pmh of recently diagnosed HTN and neck swelling with a left palatine tonsil mass concerning for T-Cell lymphoma. Pt initially presented to [facility] 10/25/24 with hoarse voice, neck swelling and dysphagia for one month- biopsy performed by ENT and pending results. She presents today as she feels like the mass on her neck is increasing in size and her voice is becoming more hoarse per chart

## 2024-11-04 NOTE — DIETITIAN INITIAL EVALUATION ADULT - PERTINENT LABORATORY DATA
11-04    140  |  101  |  9   ----------------------------<  259[H]  3.5   |  25  |  0.52    Ca    9.0      04 Nov 2024 09:18  Phos  2.8     11-04  Mg     2.10     11-04    TPro  6.9  /  Alb  3.5  /  TBili  0.5  /  DBili  x   /  AST  27  /  ALT  33  /  AlkPhos  106  11-04    A1C with Estimated Average Glucose Result: 11.9 % (11-02-24 @ 16:47)    CAPILLARY BLOOD GLUCOSE      POCT Blood Glucose.: 216 mg/dL (04 Nov 2024 12:21)  POCT Blood Glucose.: 171 mg/dL (04 Nov 2024 08:16)  POCT Blood Glucose.: 138 mg/dL (03 Nov 2024 22:23)  POCT Blood Glucose.: 198 mg/dL (03 Nov 2024 18:10)

## 2024-11-04 NOTE — DIETITIAN INITIAL EVALUATION ADULT - NSFNSPHYEXAMSKINFT_GEN_A_CORE
What Type Of Note Output Would You Prefer (Optional)?: Standard Output Is The Patient Presenting As Previously Scheduled?: Yes How Severe Is Your Rash?: moderate Is This A New Presentation, Or A Follow-Up?: Follow Up Rash No noted pressure injuries per RN flowsheets

## 2024-11-04 NOTE — DIETITIAN INITIAL EVALUATION ADULT - NUTRITIONGOAL OUTCOME1
Improved glycemic control, A1c <7%, Inpatient glucose goals: <140 pre-meal, <180 random   Verbalize 2-3 key points for dietary management for blood sugar control Yes

## 2024-11-04 NOTE — PROGRESS NOTE ADULT - SUBJECTIVE AND OBJECTIVE BOX
INTERVAL HPI/OVERNIGHT EVENTS:  No overnight events.     MEDICATIONS  (STANDING):  allopurinol 300 milliGRAM(s) Oral daily  amLODIPine   Tablet 5 milliGRAM(s) Oral with breakfast  chlorhexidine 2% Cloths 1 Application(s) Topical daily  dextrose 5%. 1000 milliLiter(s) (50 mL/Hr) IV Continuous <Continuous>  dextrose 5%. 1000 milliLiter(s) (100 mL/Hr) IV Continuous <Continuous>  dextrose 50% Injectable 25 Gram(s) IV Push once  dextrose 50% Injectable 12.5 Gram(s) IV Push once  dextrose 50% Injectable 25 Gram(s) IV Push once  enoxaparin Injectable 40 milliGRAM(s) SubCutaneous every 24 hours  filgrastim-sndz (ZARXIO) Injectable 480 MICROGram(s) SubCutaneous every 24 hours  glucagon  Injectable 1 milliGRAM(s) IntraMuscular once  insulin glargine Injectable (LANTUS) 20 Unit(s) SubCutaneous at bedtime  insulin lispro (ADMELOG) corrective regimen sliding scale   SubCutaneous three times a day before meals  insulin lispro (ADMELOG) corrective regimen sliding scale   SubCutaneous at bedtime  insulin lispro Injectable (ADMELOG) 10 Unit(s) SubCutaneous three times a day before meals  pantoprazole    Tablet 40 milliGRAM(s) Oral before breakfast  predniSONE   Tablet 100 milliGRAM(s) Oral daily  sodium chloride 0.9%. 1000 milliLiter(s) (100 mL/Hr) IV Continuous <Continuous>    MEDICATIONS  (PRN):  acetaminophen     Tablet .. 650 milliGRAM(s) Oral every 6 hours PRN Temp greater or equal to 38C (100.4F), Mild Pain (1 - 3)  acetaminophen     Tablet .. 650 milliGRAM(s) Oral every 6 hours PRN PRN REACTION  aluminum hydroxide/magnesium hydroxide/simethicone Suspension 30 milliLiter(s) Oral every 4 hours PRN Dyspepsia  dextrose Oral Gel 15 Gram(s) Oral once PRN Blood Glucose LESS THAN 70 milliGRAM(s)/deciliter  diphenhydrAMINE Injectable 50 milliGRAM(s) IV Push once PRN REACTION  melatonin 3 milliGRAM(s) Oral at bedtime PRN Insomnia  ondansetron Injectable 4 milliGRAM(s) IV Push every 8 hours PRN Nausea and/or Vomiting    Allergies    No Known Allergies    Intolerances          VITAL SIGNS:  T(F): 98.2 (11-04-24 @ 05:07)  HR: 89 (11-04-24 @ 05:07)  BP: 144/64 (11-04-24 @ 08:54)  RR: 18 (11-04-24 @ 08:54)  SpO2: 98% (11-04-24 @ 08:54)  Wt(kg): --    PHYSICAL EXAM:    General: adult in NAD  HEENT: left neck mass smaller in size (now measuring about 7cm with softer borders); tonsilar extension   CV: normal S1S2 with no murmur rubs or gallops  Lungs: clear to auscultation, no wheezes, no rhales  Abdomen: soft non-tender non-distended, no hepato/splenomegaly  Ext: no clubbing cyanosis or edema  Skin: no rashes and no petichiae  Neuro: alert and oriented X3 no focal deficits    LABS:                        13.0   38.42 )-----------( 357      ( 04 Nov 2024 09:18 )             39.1     11-04    140  |  101  |  9   ----------------------------<  259[H]  3.5   |  25  |  0.52    Ca    9.0      04 Nov 2024 09:18  Phos  2.8     11-04  Mg     2.10     11-04    TPro  6.9  /  Alb  3.5  /  TBili  0.5  /  DBili  x   /  AST  27  /  ALT  33  /  AlkPhos  106  11-04      Urinalysis Basic - ( 04 Nov 2024 09:18 )    Color: x / Appearance: x / SG: x / pH: x  Gluc: 259 mg/dL / Ketone: x  / Bili: x / Urobili: x   Blood: x / Protein: x / Nitrite: x   Leuk Esterase: x / RBC: x / WBC x   Sq Epi: x / Non Sq Epi: x / Bacteria: x        RADIOLOGY & ADDITIONAL TESTS:  Studies reviewed.

## 2024-11-04 NOTE — DIETITIAN INITIAL EVALUATION ADULT - ADD RECOMMEND
Monitor weights, labs, BM's, skin integrity, p.o. intake.     Please monitor % PO intake on flowsheets

## 2024-11-04 NOTE — DIETITIAN INITIAL EVALUATION ADULT - ORAL INTAKE PTA/DIET HISTORY
Pt reported decreased appetite and intake as of friday due to "tongue pain" stated she had difficulty speaking and swallowing at this time, additionally stated concerns for mass on her neck affecting ability to eat. Mostly consumed liquid food and juices during these days Of note pt with SLP trini on 10/28 with recommendations for easy to chew mildly thick liquid diet. Does not take any nutrition supplements. Denied N/V/D or abdominal pain. Pt unsure of weight changes 226lbs (10/26/24) most recent HIE weight. Current chart weight noted 102.1 kg (225lbs) 11/2 (dosing/admit wt)

## 2024-11-04 NOTE — PROGRESS NOTE ADULT - ASSESSMENT
59 yo female presents with new tonsillar lymphoma began chemoRx including high dose prednisone. No previous h/o diabetes but baseline A1c 11.9 c/w undiagnosed diabetes, assumed to be Type 2. BS above target on current regimen. Prefer to target BS gen'l < 180, but no indication for "tight"  control.     #Type 2 Diabetes Mellitus  - HbA1c 11.9  ; home regimen: none  -follows up with PCP  -egfr: 108    Plan:   -FS goal 100-180:  FS at goal this morning and above goal this afternoon. Pt on prednisone 100mg daily x 5 days from 11/1-11/5.  - Continue 20 units of lantus QHS. do not hold.   - Increase to 11 units of Admelog TIDQAC. hold if not eating or NPO  - Continue moderate Admelog correction scale TIDQAC and separate moderate scale QHS - while on steroids.   - Please check FSG before meals and QHS, or q6h while NPO  - Please keep patient on a diabetic, carb controlled diet   - RD consult  - hypoglycemia orderset prn  - extensively discussed importance of glycemic control to prevent micro- and macrovascular complications  - discussed importance of weight loss, exercise, and changing diet   - reviewed symptoms and management of hypoglycemia  - reviewed basics of insulin administration and pharmacokinetics, glucometer monitoring, as well as glycemic goals for outpatient setting  -please notify endocrine if any change in steroid dose.   - Discharge planning: likely glp-1 (ozempic if covered or trulicity) + metformin 500mg BID x 1 week and increase to 1000mg BID if tolerating after the 1 week.   Please send script for ozempic 0.25mg weekly to check coverage, if not covered send trulicity 0.75mg weekly.   Please send script for glucometer, test strips, lancets and alcohol swabs.   Pt would like to follow up with our office. - Patient to call [facility] at [place]:   [address]  [place] [address]   [phone]- added to our outpt follow up list to assist with scheduling an appointment.     #Hypertension  - BP goal <130/80  - BP has been in 140-150/60-70's  -on norvasc 5mg daily   - Management as per primary team    #Hyperlipidemia  - LDL goal <70  -LDL 58  -management as per primary team    d/w primary team ARMANDO Grimaldo.     Angel Uriarte [facility]  Nurse Practitioner  Division of Endocrinology & Diabetes  pager [phone]     If before 9AM or after 5PM, or on weekends/holidays, please call the Endocrine answering service for assistance ([phone]).  For nonurgent matters, please email [email or web address] for assistance.

## 2024-11-04 NOTE — DIETITIAN INITIAL EVALUATION ADULT - CALCULATED FROM (CAL/KG)
MRN:[id number]                      After Visit Summary   2016    Baby1 Alfreda Hunter    MRN: [id number]           Thank you!     Thank you for choosing [facility] for your care. Our goal is always to provide you with excellent care. Hearing back from our patients is one way we can continue to improve our services. Please take a few minutes to complete the written survey that you may receive in the mail after you visit with us. Thank you!        Patient Information     Date Of Birth          2016        About your child's hospital stay     Your child was admitted on:  2017 Your child last received care in the:  [facility]    Your child was discharged on:  2017       Who to Call     For medical emergencies, please call 911.  For non-urgent questions about your medical care, please call your primary care provider or clinic, None          Attending Provider     Provider Specialty    Enedina Velasquez MD Pediatrics       Primary Care Provider Fax #    [facility] [phone]      After Care Instructions     Activity       Developmentally appropriate care and safe sleep practices (infant on back with no use of pillows).            Breastfeeding or formula       Breast feeding or formula every 2-3 hours or on demand.                  Follow-up Appointments     Follow Up - Clinic Visit       Follow-up with clinic visit /physician within 2-3 days if age < 72 hrs, or breastfeeding, or risk for jaundice.                  Pending Results     Date and Time Order Name Status Description    2017 0400 [place] metabolic screen In process             Statement of Approval     Ordered          17 0846  I have reviewed and agree with all the recommendations and orders detailed in this document.  EFFECTIVE NOW     Approved and electronically signed by:  Enedina Velasquez MD             Admission Information     Date & Time Provider Department Dept. Phone    2017  "Enedina Velasquez MD [facility] Nurs[facility] [phone]      Your Vitals Were     Temperature Respirations Height Weight Head Circumference Pulse Oximetry    98.2  F (36.8  C) (Oral) 40 0.514 m (1' 8.25\") 3.079 kg (6 lb 12.6 oz) 33.7 cm 100%    BMI (Body Mass Index)                   11.64 kg/m2           [employer] Education Information     [employer] lets you send messages to your doctor, view your test results, renew your prescriptions, schedule appointments and more. To sign up, go to [email or web address], contact your [place] clinic or call [phone] during business hours.            Care EveryWhere ID     This is your Care EveryWhere ID. This could be used by other organizations to access your [facility] medical records  [id number]        Equal Access to Services     ARTHUR DRIVER : Darius Montejo, wathais ruiz, corina parkerallawson welsh, roslyn cline . [facility] [phone].    ATENCIÓN: Si habla español, tiene a chou disposición servicios gratuitos de asistencia lingüística. [place] al [phone].    We comply with applicable federal civil rights laws and [place] laws. We do not discriminate on the basis of race, color, national origin, age, disability sex, sexual orientation or gender identity.               Review of your medicines      Notice     You have not been prescribed any medications.             Protect others around you: Learn how to safely use, store and throw away your medicines at www.disposemymeds.org.             Medication List: This is a list of all your medications and when to take them. Check marks below indicate your daily home schedule. Keep this list as a reference.      Notice     You have not been prescribed any medications.      " [phone]

## 2024-11-04 NOTE — DIETITIAN INITIAL EVALUATION ADULT - PROBLEM SELECTOR PLAN 1
Awaiting pathology results  [facility] consulted: Prednisone 100mg daily, allopurinol 300mg daily, TLS labs Q8, TTE. Plan to initiate chemotherapy 11/2/24

## 2024-11-04 NOTE — DIETITIAN INITIAL EVALUATION ADULT - NS FNS DIET ORDER
Diet, Regular:   Consistent Carbohydrate {Evening Snack} (CSTCHOSN)  DASH/TLC {Sodium & Cholesterol Restricted} (DASH)  Soft and Bite Sized (SOFTBTSZ) (11-04-24 @ 09:28)

## 2024-11-04 NOTE — PROGRESS NOTE ADULT - SUBJECTIVE AND OBJECTIVE BOX
Patient is a 58y old  Female who presents with a chief complaint of initiation of Chemotherapy for possible T- Cell lymphoma (04 Nov 2024 14:25)    SUBJECTIVE / OVERNIGHT EVENTS:    no CP, SOB, f/c/n/v  tolerating diet  needs form for transportation     MEDICATIONS  (STANDING):  allopurinol 300 milliGRAM(s) Oral daily  amLODIPine   Tablet 5 milliGRAM(s) Oral with breakfast  chlorhexidine 2% Cloths 1 Application(s) Topical daily  enoxaparin Injectable 40 milliGRAM(s) SubCutaneous every 24 hours  filgrastim-sndz (ZARXIO) Injectable 480 MICROGram(s) SubCutaneous every 24 hours  glucagon  Injectable 1 milliGRAM(s) IntraMuscular once  insulin glargine Injectable (LANTUS) 20 Unit(s) SubCutaneous at bedtime  insulin lispro (ADMELOG) corrective regimen sliding scale   SubCutaneous three times a day before meals  insulin lispro (ADMELOG) corrective regimen sliding scale   SubCutaneous at bedtime  insulin lispro Injectable (ADMELOG) 11 Unit(s) SubCutaneous three times a day before meals  pantoprazole    Tablet 40 milliGRAM(s) Oral before breakfast  predniSONE   Tablet 100 milliGRAM(s) Oral daily  sodium chloride 0.9%. 1000 milliLiter(s) (100 mL/Hr) IV Continuous <Continuous>    MEDICATIONS  (PRN):  acetaminophen     Tablet .. 650 milliGRAM(s) Oral every 6 hours PRN Temp greater or equal to 38C (100.4F), Mild Pain (1 - 3)  acetaminophen     Tablet .. 650 milliGRAM(s) Oral every 6 hours PRN PRN REACTION  aluminum hydroxide/magnesium hydroxide/simethicone Suspension 30 milliLiter(s) Oral every 4 hours PRN Dyspepsia  dextrose Oral Gel 15 Gram(s) Oral once PRN Blood Glucose LESS THAN 70 milliGRAM(s)/deciliter  diphenhydrAMINE Injectable 50 milliGRAM(s) IV Push once PRN REACTION  melatonin 3 milliGRAM(s) Oral at bedtime PRN Insomnia  ondansetron Injectable 4 milliGRAM(s) IV Push every 8 hours PRN Nausea and/or Vomiting      T(C): 36.9 (11-04-24 @ 12:46), Max: 36.9 (11-04-24 @ 12:46)  HR: 89 (11-04-24 @ 12:46) (76 - 89)  BP: 144/79 (11-04-24 @ 12:46) (144/64 - 146/72)  RR: 16 (11-04-24 @ 12:46) (16 - 18)  SpO2: 97% (11-04-24 @ 12:46) (97% - 99%)    CAPILLARY BLOOD GLUCOSE  POCT Blood Glucose.: 216 mg/dL (04 Nov 2024 12:21)  POCT Blood Glucose.: 171 mg/dL (04 Nov 2024 08:16)  POCT Blood Glucose.: 138 mg/dL (03 Nov 2024 22:23)  POCT Blood Glucose.: 198 mg/dL (03 Nov 2024 18:10)    PHYSICAL EXAM:  GENERAL: NAD   HEAD:  Atraumatic, Normocephalic  EYES: EOMI, PERRLA, conjunctiva and sclera clear  NECK: L neck swelling   CHEST/LUNG: Clear to auscultation bilaterally; No wheeze  HEART: Regular rate and rhythm; No murmurs, rubs, or gallops  ABDOMEN: Soft, Nontender, Nondistended; Bowel sounds present  EXTREMITIES:   warm and well perfused, No clubbing, cyanosis, or edema  NEUROLOGY: non-focal    LABS:                        13.0   38.42 )-----------( 357      ( 04 Nov 2024 09:18 )             39.1     11-04    140  |  101  |  9   ----------------------------<  259[H]  3.5   |  25  |  0.52    Ca    9.0      04 Nov 2024 09:18  Phos  2.8     11-04  Mg     2.10     11-04    TPro  6.9  /  Alb  3.5  /  TBili  0.5  /  DBili  x   /  AST  27  /  ALT  33  /  AlkPhos  106  11-04    Care Discussed with Consultants/Other Providers:

## 2024-11-04 NOTE — DIETITIAN INITIAL EVALUATION ADULT - OTHER INFO
Per discussion with patient, Reported consuming atleast 75% of her meal today. Currently denied chewing/swallowing problems however mentioned she was having some "spit up" after eating. Diet texture/consistency modification per medical team discretion. Per RN flowsheet documentation intake 51-75% to %. Pt noted with new onset DM A1c% 11.% POCT blood glucose reviewed. Insulin regimen active. Of note pt on prednisone  RDN provided extensive verbal & printed nutrition education re: therapeutic diet.  Discussed carbohydrate food sources, consistent [fiber dense] carbohydrate intake & carb. counting, hypoglycemia prevention/management, & nutrition label reading.  Discouraged consumption of concentrated sweetened beverages.  Also encouraged heart healthy food choices, self glucose monitoring and emphasized endocrinology f/u. Encouraged protein and fiber rich meals, daily physical activity as tolerated.     Pt believes her last BM was friday (11/1/24 noted on RN flowsheet

## 2024-11-04 NOTE — DIETITIAN INITIAL EVALUATION ADULT - ORAL NUTRITION SUPPLEMENTS
Pt would benefit from receiving Ensure Max Daily (150 kcal, 30g pro) to meet estimated protein requirements

## 2024-11-04 NOTE — PROGRESS NOTE ADULT - SUBJECTIVE AND OBJECTIVE BOX
Chief Complaint: type 2 DM    History: Tolerating PO diet. No nausea. No hypoglycemia. FS at goal this morning and above goal this afternoon. Pt on prednisone 100mg daily x 5 days from 11/1-11/5.    MEDICATIONS  (STANDING):  allopurinol 300 milliGRAM(s) Oral daily  amLODIPine   Tablet 5 milliGRAM(s) Oral with breakfast  chlorhexidine 2% Cloths 1 Application(s) Topical daily  dextrose 5%. 1000 milliLiter(s) (50 mL/Hr) IV Continuous <Continuous>  dextrose 5%. 1000 milliLiter(s) (100 mL/Hr) IV Continuous <Continuous>  dextrose 50% Injectable 25 Gram(s) IV Push once  dextrose 50% Injectable 12.5 Gram(s) IV Push once  dextrose 50% Injectable 25 Gram(s) IV Push once  enoxaparin Injectable 40 milliGRAM(s) SubCutaneous every 24 hours  filgrastim-sndz (ZARXIO) Injectable 480 MICROGram(s) SubCutaneous every 24 hours  glucagon  Injectable 1 milliGRAM(s) IntraMuscular once  insulin glargine Injectable (LANTUS) 20 Unit(s) SubCutaneous at bedtime  insulin lispro (ADMELOG) corrective regimen sliding scale   SubCutaneous three times a day before meals  insulin lispro (ADMELOG) corrective regimen sliding scale   SubCutaneous at bedtime  insulin lispro Injectable (ADMELOG) 11 Unit(s) SubCutaneous three times a day before meals  pantoprazole    Tablet 40 milliGRAM(s) Oral before breakfast  predniSONE   Tablet 100 milliGRAM(s) Oral daily  sodium chloride 0.9%. 1000 milliLiter(s) (100 mL/Hr) IV Continuous <Continuous>    MEDICATIONS  (PRN):  acetaminophen     Tablet .. 650 milliGRAM(s) Oral every 6 hours PRN Temp greater or equal to 38C (100.4F), Mild Pain (1 - 3)  acetaminophen     Tablet .. 650 milliGRAM(s) Oral every 6 hours PRN PRN REACTION  aluminum hydroxide/magnesium hydroxide/simethicone Suspension 30 milliLiter(s) Oral every 4 hours PRN Dyspepsia  dextrose Oral Gel 15 Gram(s) Oral once PRN Blood Glucose LESS THAN 70 milliGRAM(s)/deciliter  diphenhydrAMINE Injectable 50 milliGRAM(s) IV Push once PRN REACTION  melatonin 3 milliGRAM(s) Oral at bedtime PRN Insomnia  ondansetron Injectable 4 milliGRAM(s) IV Push every 8 hours PRN Nausea and/or Vomiting      Allergies    No Known Allergies    Intolerances      Review of Systems:  Cardiovascular: No chest pain, palpitations  Respiratory: No SOB, no cough  GI: No nausea, vomiting, abdominal pain  : No dysuria  Endocrine: no polyuria, polydipsia    PHYSICAL EXAM:  VITALS: T(C): 36.9 (11-04-24 @ 12:46)  T(F): 98.5 (11-04-24 @ 12:46), Max: 98.5 (11-04-24 @ 12:46)  HR: 89 (11-04-24 @ 12:46) (76 - 89)  BP: 144/79 (11-04-24 @ 12:46) (144/64 - 146/72)  RR:  (16 - 18)  SpO2:  (97% - 99%)  Wt(kg): --  GENERAL: NAD, well-groomed, well-developed  EYES: No proptosis  HEENT:  Atraumatic, Normocephalic  RESPIRATORY: non labored breathing   CARDIOVASCULAR: Regular rate and rhythm  GI: Soft, nontender, non distended  PSYCH: Alert and oriented x 3, normal affect, normal mood       CAPILLARY BLOOD GLUCOSE      POCT Blood Glucose.: 216 mg/dL (04 Nov 2024 12:21)  POCT Blood Glucose.: 171 mg/dL (04 Nov 2024 08:16)  POCT Blood Glucose.: 138 mg/dL (03 Nov 2024 22:23)  POCT Blood Glucose.: 198 mg/dL (03 Nov 2024 18:10)      11-04    140  |  101  |  9   ----------------------------<  259[H]  3.5   |  25  |  0.52    eGFR: 108    Ca    9.0      11-04  Mg     2.10     11-04  Phos  2.8     11-04    TPro  6.9  /  Alb  3.5  /  TBili  0.5  /  DBili  x   /  AST  27  /  ALT  33  /  AlkPhos  106  11-04          Thyroid Function Tests:        A1C with Estimated Average Glucose Result: 11.9 % (11-02-24 @ 16:47)          Diet, Regular:   Consistent Carbohydrate Evening Snack (CSTCHOSN)  DASH/TLC Sodium & Cholesterol Restricted (DASH)  Soft and Bite Sized (SOFTBTSZ) (11-04-24 @ 09:28)

## 2024-11-04 NOTE — PROGRESS NOTE ADULT - ASSESSMENT
59 yo woman presented initially on 10/25/24 with large left sided neck/tonsillar mass and after biopsy by ENT was deemed stable by their service for discharge; path resulted as lymphoma (suspected T-Cell) and patient called back to return to hospital to initiate chemotherapy given location of her disease and concern for airway compromise.  - Echo done 11/1/24 - EF 66%; appreciate cardiology team  - Completed Cytoxan/Adrimaycin 11/2/24 without incident    TREATMENT:  - Prednisone 100mg daily X5 days initiated 11/1/24 - 11/5/24. Keep patient on pantoprazole while on steroids. If hyperglycemic can request endocrine consult.  - Plan to start growth factor support filgastrim at 480mcg daily for next 5 days (11/3/24 - 11/7/24)  - Upon confirmation of T-cell origin by pathology, will add Brentuximab Vedotin (anticipate on 11/4/24)  - To monitor tumor lysis labs Q12 hours and monitor airway  - Continue allopurinol 300mg daily to prevent renal complications; monitor for rash  - Will follow closely.  - Will set up with hematologist at [facility] upon hospital discharge    NOTE INCOMPLETE UNTIL ATTENDING SIGNS    ***************************************************************  Tanja Banks, PGY5  Fellow Hematology/Oncology  pager: [phone]   Available on Microsoft Teams  After 5pm or on weekends please contact  to page on-call fellow   ***************************************************************       59 yo woman presented initially on 10/25/24 with large left sided neck/tonsillar mass and after biopsy by ENT was deemed stable by their service for discharge; path resulted as lymphoma (suspected T-Cell) and patient called back to return to hospital to initiate chemotherapy given location of her disease and concern for airway compromise. Current differential dx is PTCL-NOS or ATLL.       Echo done 11/1/24 - EF 66%; appreciate cardiology team. Completed Cytoxan/Adrimaycin 11/2/24 without incident    - Pathology: neoplasm is positive for CD3, CD5, MUM-1, CD4, CD2, with aberrant loss of CD7. Neg: CD20, PAX-5, CD10, BCL-6, CD30, KAYLEY, cytokeratin. Ki67 80%.  CD25 is weak positive, which raises the possibility of adult T-cell lymphoma. Pending FOXP3 to support dx of ATCL. Other stains also show: Pos - PD-1 (weak), GATA3; Neg - CD8, CD56, ALK1      TREATMENT:  # Mature T-cell lymphoma  - Prednisone 100mg daily X5 days initiated 11/1/24 - 11/5/24. Keep patient on pantoprazole while on steroids. If hyperglycemic can request endocrine consult.  - Plan to start growth factor support filgastrim at 480mcg daily for next 5 days (11/3/24 - 11/7/24). However, given rise in WBCs and need to give additional chemotherapy inpatient please hold filgastrim for now.   - check for HTLV-1  - Upon confirmation of T-cell origin by pathology, will likely add etoposide and vincristine (consented today) but pending tumor board discussion 11/5/24  - To monitor tumor lysis labs Q12 hours and monitor airway  - Continue allopurinol 300mg daily to prevent renal complications; monitor for rash  - Will follow closely.  - Will set up with hematologist at [facility] upon hospital discharge    NOTE INCOMPLETE UNTIL ATTENDING SIGNS    ***************************************************************  Tanja Banks, PGY5  Fellow Hematology/Oncology  pager: [phone]   Available on Microsoft Teams  After 5pm or on weekends please contact  to page on-call fellow   ***************************************************************       57 yo woman presented initially on 10/25/24 with large left sided neck/tonsillar mass and after biopsy by ENT was deemed stable by their service for discharge; path resulted as lymphoma (suspected T-Cell) and patient called back to return to hospital to initiate chemotherapy given location of her disease and concern for airway compromise. Current differential dx is PTCL-NOS or ATLL.     # Mature T-cell lymphoma  - Echo done 11/1/24 - EF 66%; appreciate cardiology team.   - Completed Cytoxan/Adrimaycin 11/2/24 without incident  - Pathology: neoplasm is positive for CD3, CD5, MUM-1, CD4, CD2, with aberrant loss of CD7. Neg: CD20, PAX-5, CD10, BCL-6, CD30, KAYLEY, cytokeratin. Ki67 80%.  CD25 is weak positive, which raises the possibility of adult T-cell lymphoma. Pending FOXP3 to support dx of ATCL. Other stains also show: Pos - PD-1 (weak), GATA3; Neg - CD8, CD56, ALK1    TREATMENT:  # Mature T-cell lymphoma  - Prednisone 100mg daily X5 days initiated 11/1/24 - 11/5/24. Keep patient on pantoprazole while on steroids. If hyperglycemic can request endocrine consult.  - Plan to start growth factor support filgastrim at 480mcg daily for next 5 days (11/3/24 - 11/7/24). However, given rise in WBCs and need to give additional chemotherapy inpatient please hold filgastrim for now.   - check for HTLV-1  - Upon confirmation of final diagnosis by pathology, will likely add etoposide and vincristine (consented today) but pending tumor board discussion 11/5/24  - To monitor tumor lysis labs Q12 hours and monitor airway  - Continue allopurinol 300mg daily to prevent renal complications; monitor for rash  - Will follow closely.  - Will set up with hematologist at [facility] upon hospital discharge    NOTE INCOMPLETE UNTIL ATTENDING SIGNS    ***************************************************************  Tanja Banks, PGY5  Fellow Hematology/Oncology  pager: [phone]   Available on Microsoft Teams  After 5pm or on weekends please contact  to page on-call fellow   ***************************************************************

## 2024-11-05 LAB
ALBUMIN SERPL ELPH-MCNC: 3.4 G/DL — SIGNIFICANT CHANGE UP (ref 3.3–5)
ALP SERPL-CCNC: 127 U/L — HIGH (ref 40–120)
ALT FLD-CCNC: 28 U/L — SIGNIFICANT CHANGE UP (ref 4–33)
ANION GAP SERPL CALC-SCNC: 14 MMOL/L — SIGNIFICANT CHANGE UP (ref 7–14)
AST SERPL-CCNC: 23 U/L — SIGNIFICANT CHANGE UP (ref 4–32)
BASOPHILS # BLD AUTO: 0.19 K/UL — SIGNIFICANT CHANGE UP (ref 0–0.2)
BASOPHILS NFR BLD AUTO: 0.5 % — SIGNIFICANT CHANGE UP (ref 0–2)
BILIRUB SERPL-MCNC: 0.4 MG/DL — SIGNIFICANT CHANGE UP (ref 0.2–1.2)
BUN SERPL-MCNC: 7 MG/DL — SIGNIFICANT CHANGE UP (ref 7–23)
CALCIUM SERPL-MCNC: 8.9 MG/DL — SIGNIFICANT CHANGE UP (ref 8.4–10.5)
CHLORIDE SERPL-SCNC: 100 MMOL/L — SIGNIFICANT CHANGE UP (ref 98–107)
CO2 SERPL-SCNC: 27 MMOL/L — SIGNIFICANT CHANGE UP (ref 22–31)
CREAT SERPL-MCNC: 0.45 MG/DL — LOW (ref 0.5–1.3)
EGFR: 112 ML/MIN/1.73M2 — SIGNIFICANT CHANGE UP
EOSINOPHIL # BLD AUTO: 0 K/UL — SIGNIFICANT CHANGE UP (ref 0–0.5)
EOSINOPHIL NFR BLD AUTO: 0 % — SIGNIFICANT CHANGE UP (ref 0–6)
GLUCOSE BLDC GLUCOMTR-MCNC: 107 MG/DL — HIGH (ref 70–99)
GLUCOSE BLDC GLUCOMTR-MCNC: 120 MG/DL — HIGH (ref 70–99)
GLUCOSE BLDC GLUCOMTR-MCNC: 156 MG/DL — HIGH (ref 70–99)
GLUCOSE BLDC GLUCOMTR-MCNC: 222 MG/DL — HIGH (ref 70–99)
GLUCOSE BLDC GLUCOMTR-MCNC: 99 MG/DL — SIGNIFICANT CHANGE UP (ref 70–99)
GLUCOSE SERPL-MCNC: 233 MG/DL — HIGH (ref 70–99)
HCT VFR BLD CALC: 39 % — SIGNIFICANT CHANGE UP (ref 34.5–45)
HGB BLD-MCNC: 12.8 G/DL — SIGNIFICANT CHANGE UP (ref 11.5–15.5)
IANC: 31.02 K/UL — HIGH (ref 1.8–7.4)
IMM GRANULOCYTES NFR BLD AUTO: 11.7 % — HIGH (ref 0–0.9)
LDH SERPL L TO P-CCNC: 474 U/L — HIGH (ref 135–225)
LYMPHOCYTES # BLD AUTO: 0.21 K/UL — LOW (ref 1–3.3)
LYMPHOCYTES # BLD AUTO: 0.6 % — LOW (ref 13–44)
MAGNESIUM SERPL-MCNC: 2 MG/DL — SIGNIFICANT CHANGE UP (ref 1.6–2.6)
MCHC RBC-ENTMCNC: 25.2 PG — LOW (ref 27–34)
MCHC RBC-ENTMCNC: 32.8 G/DL — SIGNIFICANT CHANGE UP (ref 32–36)
MCV RBC AUTO: 76.9 FL — LOW (ref 80–100)
MONOCYTES # BLD AUTO: 0.16 K/UL — SIGNIFICANT CHANGE UP (ref 0–0.9)
MONOCYTES NFR BLD AUTO: 0.4 % — LOW (ref 2–14)
NEUTROPHILS # BLD AUTO: 31.02 K/UL — HIGH (ref 1.8–7.4)
NEUTROPHILS NFR BLD AUTO: 86.8 % — HIGH (ref 43–77)
NRBC # BLD: 0 /100 WBCS — SIGNIFICANT CHANGE UP (ref 0–0)
NRBC # FLD: 0 K/UL — SIGNIFICANT CHANGE UP (ref 0–0)
PHOSPHATE SERPL-MCNC: 3.2 MG/DL — SIGNIFICANT CHANGE UP (ref 2.5–4.5)
PLATELET # BLD AUTO: 289 K/UL — SIGNIFICANT CHANGE UP (ref 150–400)
POTASSIUM SERPL-MCNC: 3.4 MMOL/L — LOW (ref 3.5–5.3)
POTASSIUM SERPL-SCNC: 3.4 MMOL/L — LOW (ref 3.5–5.3)
PROT SERPL-MCNC: 6.7 G/DL — SIGNIFICANT CHANGE UP (ref 6–8.3)
RBC # BLD: 5.07 M/UL — SIGNIFICANT CHANGE UP (ref 3.8–5.2)
RBC # FLD: 14.1 % — SIGNIFICANT CHANGE UP (ref 10.3–14.5)
SODIUM SERPL-SCNC: 141 MMOL/L — SIGNIFICANT CHANGE UP (ref 135–145)
URATE SERPL-MCNC: 3.3 MG/DL — SIGNIFICANT CHANGE UP (ref 2.5–7)
WBC # BLD: 35.76 K/UL — HIGH (ref 3.8–10.5)
WBC # FLD AUTO: 35.76 K/UL — HIGH (ref 3.8–10.5)

## 2024-11-05 PROCEDURE — 99232 SBSQ HOSP IP/OBS MODERATE 35: CPT

## 2024-11-05 PROCEDURE — 99233 SBSQ HOSP IP/OBS HIGH 50: CPT | Mod: GC

## 2024-11-05 RX ORDER — DULAGLUTIDE 0.75 MG/.5ML
0.75 INJECTION, SOLUTION SUBCUTANEOUS
Qty: 2 | Refills: 0
Start: 2024-11-05 | End: 2024-12-04

## 2024-11-05 RX ORDER — INSULIN LISPRO 100/ML
VIAL (ML) SUBCUTANEOUS AT BEDTIME
Refills: 0 | Status: DISCONTINUED | OUTPATIENT
Start: 2024-11-06 | End: 2024-11-07

## 2024-11-05 RX ORDER — INSULIN LISPRO 100/ML
12 VIAL (ML) SUBCUTANEOUS
Refills: 0 | Status: COMPLETED | OUTPATIENT
Start: 2024-11-05 | End: 2024-11-05

## 2024-11-05 RX ORDER — INSULIN LISPRO 100/ML
VIAL (ML) SUBCUTANEOUS
Refills: 0 | Status: DISCONTINUED | OUTPATIENT
Start: 2024-11-06 | End: 2024-11-06

## 2024-11-05 RX ORDER — INSULIN LISPRO 100/ML
8 VIAL (ML) SUBCUTANEOUS
Refills: 0 | Status: DISCONTINUED | OUTPATIENT
Start: 2024-11-06 | End: 2024-11-07

## 2024-11-05 RX ADMIN — Medication 5 MILLIGRAM(S): at 09:13

## 2024-11-05 RX ADMIN — SODIUM CHLORIDE 100 MILLILITER(S): 9 INJECTION, SOLUTION INTRAMUSCULAR; INTRAVENOUS; SUBCUTANEOUS at 06:34

## 2024-11-05 RX ADMIN — Medication 4: at 12:45

## 2024-11-05 RX ADMIN — Medication 2: at 09:12

## 2024-11-05 RX ADMIN — Medication 11 UNIT(S): at 12:46

## 2024-11-05 RX ADMIN — Medication 12 UNIT(S): at 18:35

## 2024-11-05 RX ADMIN — Medication 300 MILLIGRAM(S): at 12:46

## 2024-11-05 RX ADMIN — CHLORHEXIDINE GLUCONATE 1 APPLICATION(S): 40 SOLUTION TOPICAL at 12:47

## 2024-11-05 RX ADMIN — Medication 11 UNIT(S): at 09:13

## 2024-11-05 RX ADMIN — Medication 100 MILLIGRAM(S): at 05:24

## 2024-11-05 RX ADMIN — PANTOPRAZOLE SODIUM 40 MILLIGRAM(S): 40 TABLET, DELAYED RELEASE ORAL at 05:24

## 2024-11-05 RX ADMIN — Medication 40 MILLIGRAM(S): at 18:36

## 2024-11-05 RX ADMIN — Medication 20 UNIT(S): at 22:43

## 2024-11-05 NOTE — PROGRESS NOTE ADULT - SUBJECTIVE AND OBJECTIVE BOX
Patient is a 58y old  Female who presents with a chief complaint of initiation of Chemotherapy for possible T- Cell lymphoma (05 Nov 2024 12:53)    SUBJECTIVE / OVERNIGHT EVENTS:    no CP, SOB, f/c/n/v  [facility] waiting onc plan     MEDICATIONS  (STANDING):  allopurinol 300 milliGRAM(s) Oral daily  amLODIPine   Tablet 5 milliGRAM(s) Oral with breakfast  chlorhexidine 2% Cloths 1 Application(s) Topical daily  enoxaparin Injectable 40 milliGRAM(s) SubCutaneous every 24 hours  glucagon  Injectable 1 milliGRAM(s) IntraMuscular once  insulin glargine Injectable (LANTUS) 20 Unit(s) SubCutaneous at bedtime  insulin lispro (ADMELOG) corrective regimen sliding scale   SubCutaneous at bedtime  insulin lispro (ADMELOG) corrective regimen sliding scale   SubCutaneous three times a day before meals  insulin lispro Injectable (ADMELOG) 12 Unit(s) SubCutaneous three times a day before meals  pantoprazole    Tablet 40 milliGRAM(s) Oral before breakfast  sodium chloride 0.9%. 1000 milliLiter(s) (100 mL/Hr) IV Continuous <Continuous>    MEDICATIONS  (PRN):  acetaminophen     Tablet .. 650 milliGRAM(s) Oral every 6 hours PRN PRN REACTION  acetaminophen     Tablet .. 650 milliGRAM(s) Oral every 6 hours PRN Temp greater or equal to 38C (100.4F), Mild Pain (1 - 3)  aluminum hydroxide/magnesium hydroxide/simethicone Suspension 30 milliLiter(s) Oral every 4 hours PRN Dyspepsia  dextrose Oral Gel 15 Gram(s) Oral once PRN Blood Glucose LESS THAN 70 milliGRAM(s)/deciliter  diphenhydrAMINE Injectable 50 milliGRAM(s) IV Push once PRN REACTION  melatonin 3 milliGRAM(s) Oral at bedtime PRN Insomnia  ondansetron Injectable 4 milliGRAM(s) IV Push every 8 hours PRN Nausea and/or Vomiting    T(C): 36.8 (11-05-24 @ 05:15), Max: 36.8 (11-05-24 @ 05:15)  HR: 92 (11-05-24 @ 08:55) (89 - 93)  BP: 143/75 (11-05-24 @ 08:55) (134/69 - 153/82)  RR: 18 (11-05-24 @ 08:55) (17 - 18)  SpO2: 97% (11-05-24 @ 08:55) (96% - 100%)    CAPILLARY BLOOD GLUCOSE  POCT Blood Glucose.: 222 mg/dL (05 Nov 2024 12:34)  POCT Blood Glucose.: 156 mg/dL (05 Nov 2024 08:35)  POCT Blood Glucose.: 99 mg/dL (05 Nov 2024 03:01)  POCT Blood Glucose.: 100 mg/dL (04 Nov 2024 22:20)  POCT Blood Glucose.: 110 mg/dL (04 Nov 2024 18:03)    PHYSICAL EXAM:  GENERAL: NAD   NECK: L neck swelling   CHEST/LUNG: Clear to auscultation bilaterally; No wheeze  HEART: Regular rate and rhythm; No murmurs, rubs, or gallops  ABDOMEN: Soft, Nontender, Nondistended; Bowel sounds present  EXTREMITIES:   warm and well perfused, No clubbing, cyanosis, or edema  NEUROLOGY: non-focal    LABS:                        12.8   35.76 )-----------( 289      ( 05 Nov 2024 10:03 )             39.0     11-05    141  |  100  |  7   ----------------------------<  233[H]  3.4[L]   |  27  |  0.45[L]    Ca    8.9      05 Nov 2024 10:03  Phos  3.2     11-05  Mg     2.00     11-05    TPro  6.7  /  Alb  3.4  /  TBili  0.4  /  DBili  x   /  AST  23  /  ALT  28  /  AlkPhos  127[H]  11-05    Consultant(s) Notes Reviewed:    Care Discussed with Consultants/Other Providers:

## 2024-11-05 NOTE — PROGRESS NOTE ADULT - SUBJECTIVE AND OBJECTIVE BOX
Chief Complaint: type 2 DM    History: Tolerating PO diet. No hypoglycemia. FS at goal last night and this morning and above goal this afternoon.     MEDICATIONS  (STANDING):  allopurinol 300 milliGRAM(s) Oral daily  amLODIPine   Tablet 5 milliGRAM(s) Oral with breakfast  chlorhexidine 2% Cloths 1 Application(s) Topical daily  dextrose 5%. 1000 milliLiter(s) (50 mL/Hr) IV Continuous <Continuous>  dextrose 5%. 1000 milliLiter(s) (100 mL/Hr) IV Continuous <Continuous>  dextrose 50% Injectable 25 Gram(s) IV Push once  dextrose 50% Injectable 12.5 Gram(s) IV Push once  dextrose 50% Injectable 25 Gram(s) IV Push once  enoxaparin Injectable 40 milliGRAM(s) SubCutaneous every 24 hours  glucagon  Injectable 1 milliGRAM(s) IntraMuscular once  insulin glargine Injectable (LANTUS) 20 Unit(s) SubCutaneous at bedtime  insulin lispro (ADMELOG) corrective regimen sliding scale   SubCutaneous three times a day before meals  insulin lispro (ADMELOG) corrective regimen sliding scale   SubCutaneous at bedtime  insulin lispro Injectable (ADMELOG) 12 Unit(s) SubCutaneous three times a day before meals  pantoprazole    Tablet 40 milliGRAM(s) Oral before breakfast  predniSONE   Tablet 100 milliGRAM(s) Oral daily  sodium chloride 0.9%. 1000 milliLiter(s) (100 mL/Hr) IV Continuous <Continuous>    MEDICATIONS  (PRN):  acetaminophen     Tablet .. 650 milliGRAM(s) Oral every 6 hours PRN PRN REACTION  acetaminophen     Tablet .. 650 milliGRAM(s) Oral every 6 hours PRN Temp greater or equal to 38C (100.4F), Mild Pain (1 - 3)  aluminum hydroxide/magnesium hydroxide/simethicone Suspension 30 milliLiter(s) Oral every 4 hours PRN Dyspepsia  dextrose Oral Gel 15 Gram(s) Oral once PRN Blood Glucose LESS THAN 70 milliGRAM(s)/deciliter  diphenhydrAMINE Injectable 50 milliGRAM(s) IV Push once PRN REACTION  melatonin 3 milliGRAM(s) Oral at bedtime PRN Insomnia  ondansetron Injectable 4 milliGRAM(s) IV Push every 8 hours PRN Nausea and/or Vomiting      Allergies    No Known Allergies    Intolerances      Review of Systems:  Cardiovascular: No chest pain, palpitations  Respiratory: No SOB, no cough  GI: No nausea, vomiting, abdominal pain  : No dysuria  Endocrine: no polyuria, polydipsia      PHYSICAL EXAM:  VITALS: T(C): 36.8 (11-05-24 @ 05:15)  T(F): 98.2 (11-05-24 @ 05:15), Max: 98.2 (11-05-24 @ 05:15)  HR: 92 (11-05-24 @ 08:55) (89 - 93)  BP: 143/75 (11-05-24 @ 08:55) (134/69 - 153/82)  RR:  (17 - 18)  SpO2:  (96% - 100%)  Wt(kg): --  GENERAL: NAD, well-groomed, well-developed  EYES: No proptosis  HEENT:  Atraumatic, Normocephalic  RESPIRATORY: non labored breathing   CARDIOVASCULAR: Regular rate and rhythm  GI: Soft, nontender, non distended  PSYCH: Alert and oriented x 3, normal affect, normal mood       CAPILLARY BLOOD GLUCOSE      POCT Blood Glucose.: 222 mg/dL (05 Nov 2024 12:34)  POCT Blood Glucose.: 156 mg/dL (05 Nov 2024 08:35)  POCT Blood Glucose.: 99 mg/dL (05 Nov 2024 03:01)  POCT Blood Glucose.: 100 mg/dL (04 Nov 2024 22:20)  POCT Blood Glucose.: 110 mg/dL (04 Nov 2024 18:03)      11-05    141  |  100  |  7   ----------------------------<  233[H]  3.4[L]   |  27  |  0.45[L]    eGFR: 112    Ca    8.9      11-05  Mg     2.00     11-05  Phos  3.2     11-05    TPro  6.7  /  Alb  3.4  /  TBili  0.4  /  DBili  x   /  AST  23  /  ALT  28  /  AlkPhos  127[H]  11-05          Thyroid Function Tests:        A1C with Estimated Average Glucose Result: 11.9 % (11-02-24 @ 16:47)          Diet, Regular:   Consistent Carbohydrate Evening Snack (CSTCHOSN)  DASH/TLC Sodium & Cholesterol Restricted (DASH)  Soft and Bite Sized (SOFTBTSZ)  Supplement Feeding Modality:  Oral  Ensure Max Cans or Servings Per Day:  1       Frequency:  Daily (11-04-24 @ 17:01)

## 2024-11-05 NOTE — PROGRESS NOTE ADULT - ASSESSMENT
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 No previous h/o diabetes but baseline A1c 11.9 c/w undiagnosed diabetes, assumed to be Type 2. BS above target on current regimen. Prefer to target BS gen'l < 180, but no indication for "tight"  control.     #Type 2 Diabetes Mellitus  - HbA1c 11.9  ; home regimen: none  -follows up with PCP  -egfr: 108    Plan:   -FS goal 100-180:  FS at goal last night and this morning and above goal this afternoon. Last day of prednisone 100mg daily x 5 days from (11/1-11/5) was this morning.   - Continue 20 units of lantus QHS. do not hold.   - Increase to 12 units of Admelog for dinner time tonight then will decrease to 8 units premeal TID (close to weight based dose) starting tomorrow at breakfast since no longer on prednisone- orders are in.  - Continue moderate Admelog correction scale TIDQAC and separate moderate scale QHS for today and then changed to low scales premeal and separate low bedtime scale starting tomorrow.   **if steroid plan changes and pt going to be on more steroids please notify endocrine as this will **  - Please check FSG before meals and QHS, or q6h while NPO  - Please keep patient on a diabetic, carb controlled diet   - RD consult  - hypoglycemia orderset prn  - extensively discussed importance of glycemic control to prevent micro- and macrovascular complications  - discussed importance of weight loss, exercise, and changing diet   - reviewed symptoms and management of hypoglycemia  - reviewed basics of insulin administration and pharmacokinetics, glucometer monitoring, as well as glycemic goals for outpatient setting    - Discharge planning: likely glp-1 (ozempic if covered or trulicity) + metformin 500mg BID x 1 week and increase to 1000mg BID if tolerating after the 1 week.   Please send script for ozempic 0.25mg weekly to check coverage, if not covered send trulicity 0.75mg weekly.   Please send script for glucometer, test strips, lancets and alcohol swabs.   Pt would like to follow up with our office. - Patient to call [facility] at [place]:   [address]  [place], [place] [address] [phone]- added to our outpt follow up list to assist with scheduling an appointment.     #Hypertension  - BP goal <130/80  - BP has been in 140-150/60-70's  -on norvasc 5mg daily   - Management as per primary team    #Hyperlipidemia  - LDL goal <70  -LDL 58  -management as per primary team    d/w primary team ARMANDO Grimaldo.     Angel Uriarte [facility]-BC  Nurse Practitioner  Division of Endocrinology & Diabetes  pager [phone]     If before 9AM or after 5PM, or on weekends/holidays, please call the Endocrine answering service for assistance ([phone]).  For nonurgent matters, please email [email or web address] for assistance.            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 No previous h/o diabetes but baseline A1c 11.9 c/w undiagnosed diabetes, assumed to be Type 2. BS above target on current regimen. Prefer to target BS gen'l < 180, but no indication for "tight"  control.     #Type 2 Diabetes Mellitus  - HbA1c 11.9  ; home regimen: none  -follows up with PCP  -egfr: 108    Plan:   -FS goal 100-180:  FS at goal last night and this morning and above goal this afternoon. Last day of prednisone 100mg daily x 5 days from (11/1-11/5) was this morning.   - Continue 20 units of lantus QHS. do not hold.   - Increase to 12 units of Admelog for dinner time tonight then will decrease to 8 units premeal TID (close to weight based dose) starting tomorrow at breakfast since no longer on prednisone- orders are in.  - Continue moderate Admelog correction scale TIDQAC and separate moderate scale QHS for today and then changed to low scales premeal and separate low bedtime scale starting tomorrow.   **if steroid plan changes and pt going to be on more steroids please notify endocrine as this will **  - Please check FSG before meals and QHS, or q6h while NPO  - Please keep patient on a diabetic, carb controlled diet   - RD consult  - hypoglycemia orderset prn  - extensively discussed importance of glycemic control to prevent micro- and macrovascular complications  - discussed importance of weight loss, exercise, and changing diet   - reviewed symptoms and management of hypoglycemia  - reviewed basics of insulin administration and pharmacokinetics, glucometer monitoring, as well as glycemic goals for outpatient setting    - Discharge planning: likely glp-1  (trulicity- PA in progress) + metformin 500mg BID x 1 week and increase to 1000mg BID if tolerating after the 1 week.   Please send script for ozempic 0.25mg weekly to check coverage, if not covered send trulicity 0.75mg weekly.   Please send script for glucometer, test strips, lancets and alcohol swabs.   Pt would like to follow up with our office. - Patient to call [facility] at [place]:   [address]  [place], [place] [address] [phone]- added to our outpt follow up list to assist with scheduling an appointment.     #Hypertension  - BP goal <130/80  - BP has been in 140-150/60-70's  -on norvasc 5mg daily   - Management as per primary team    #Hyperlipidemia  - LDL goal <70  -LDL 58  -management as per primary team    d/w primary team ARMANDO Grimaldo.     Angel Uriarte [facility]-BC  Nurse Practitioner  Division of Endocrinology & Diabetes  pager [phone]     If before 9AM or after 5PM, or on weekends/holidays, please call the Endocrine answering service for assistance ([phone]).  For nonurgent matters, please email [email or web address] for assistance.

## 2024-11-05 NOTE — PROGRESS NOTE ADULT - ASSESSMENT
57 yo woman presented initially on 10/25/24 with large left sided neck/tonsillar mass and after biopsy by ENT was deemed stable by their service for discharge; path resulted as lymphoma (suspected T-Cell) and patient called back to return to hospital to initiate chemotherapy given location of her disease and concern for airway compromise. Current differential dx is PTCL-NOS or ATLL.     # Mature T-cell lymphoma  - Echo done 11/1/24 - EF 66%; appreciate cardiology team.   - Completed Cytoxan/Adrimaycin 11/2/24 without incident  - Pathology: neoplasm is positive for CD3, CD5, MUM-1, CD4, CD2, with aberrant loss of CD7. Neg: CD20, PAX-5, CD10, BCL-6, CD30, KAYLEY, cytokeratin. Ki67 80%.  CD25 is weak positive, which raises the possibility of adult T-cell lymphoma. Pending FOXP3 to support dx of ATCL. Other stains also show: Pos - PD-1 (weak), GATA3; Neg - CD8, CD56, ALK1    TREATMENT:  # Mature T-cell lymphoma  - Prednisone 100mg daily X5 days initiated 11/1/24 - 11/5/24. Keep patient on pantoprazole while on steroids. If hyperglycemic can request endocrine consult.  - Planned to give growth factor support filgastrim at 480mcg daily for 5 days (11/3/24 - 11/7/24). However, given rise in WBCst stopped filgastrim on 11/4/24.   - f/u HTLV-1 - if this is positive this is likely ATLL and will try to get patient to stem cell transplant once she has achieves a complete response with her current regimen.  - Patient was presented at lymphoma tumor board on 11/5/24 and it was agreed to add Brentuximab-Vedotin. Patient was consented and order given to [facility] Onc Pharmacist and Chemo nurse to be given 11/6/24. Will give pegfilgrastim on 11/7/24.  - Monitor tumor lysis labs Q12 hours and monitor airway. If uric acid > 8, give 3 mg IV rasburicase, and if uric acid > 12 give 6 mg rasburicase  - Continue allopurinol 300mg daily to prevent renal complications; monitor for rash  - Will follow closely. Further studies pending to confirm final diagnosis with hematopathology.  - Will set up with hematologist at [facility] upon hospital discharge. If patient tolerates treatment with BV-CHP, once she receives pegfilgrastim on 11/7/24, she will likely be cleared for discharge by hematology.    NOTE INCOMPLETE UNTIL ATTENDING SIGNS    ***************************************************************  Tanja Banks, PGY5  Fellow Hematology/Oncology  pager: [phone]   Available on Microsoft Teams  After 5pm or on weekends please contact  to page on-call fellow   ***************************************************************     57 yo woman presented initially on 10/25/24 with large left sided neck/tonsillar mass and after biopsy by ENT was deemed stable by their service for discharge; path resulted as lymphoma (suspected T-Cell) and patient called back to return to hospital to initiate chemotherapy given location of her disease and concern for airway compromise. Current differential dx is PTCL-NOS or ATLL.     # Mature T-cell lymphoma  - Echo done 11/1/24 - EF 66%; appreciate cardiology team.   - Completed Cytoxan/Adrimaycin 11/2/24 without incident  - Pathology: neoplasm is positive for CD3, CD5, MUM-1, CD4, CD2, with aberrant loss of CD7. Neg: CD20, PAX-5, CD10, BCL-6, CD30, KAYLEY, cytokeratin. Ki67 80%.  CD25 is weak positive, which raises the possibility of adult T-cell lymphoma. Pending FOXP3 to support dx of ATCL. Other stains also show: Pos - PD-1 (weak), GATA3; Neg - CD8, CD56, ALK1    TREATMENT:  # Mature T-cell lymphoma  - Prednisone 100mg daily X5 days initiated 11/1/24 - 11/5/24. Keep patient on pantoprazole while on steroids. If hyperglycemic can request endocrine consult.  - Planned to give growth factor support filgastrim at 480mcg daily for 5 days (11/3/24 - 11/7/24). However, given rise in WBCst stopped filgastrim on 11/4/24.   - f/u HTLV-1 - if this is positive this is likely ATLL and will try to get patient to stem cell transplant once she has achieves a complete response with her current regimen.  - Patient was presented at lymphoma tumor board on 11/5/24 and it was agreed to add Brentuximab-Vedotin. Patient was consented and order given to [facility] Onc Pharmacist and Chemo nurse to be given 11/6/24. Will give pegfilgrastim on 11/7/24.  - Monitor tumor lysis labs Q12 hours and monitor airway. If uric acid > 8, give 3 mg IV rasburicase, and if uric acid > 12 give 6 mg rasburicase  - Continue allopurinol 300mg daily to prevent renal complications; monitor for rash  - Obtain CT C/A/P  - Recommend LP to r/o CNS disease with cytology and flow cytometry  - Will follow closely. Further studies pending to confirm final diagnosis with hematopathology.  - Will set up with hematologist at [facility] upon hospital discharge. If patient tolerates treatment with BV-CHP, once she receives pegfilgrastim on 11/7/24, she will likely be cleared for discharge by hematology.    NOTE INCOMPLETE UNTIL ATTENDING SIGNS    ***************************************************************  Tanja Banks, PGY5  Fellow Hematology/Oncology  pager: [phone]   Available on Microsoft Teams  After 5pm or on weekends please contact  to page on-call fellow   ***************************************************************     57 yo woman presented initially on 10/25/24 with large left sided neck/tonsillar mass and after biopsy by ENT was deemed stable by their service for discharge; path resulted as lymphoma (suspected T-Cell) and patient called back to return to hospital to initiate chemotherapy given location of her disease and concern for airway compromise. Current differential dx is PTCL-NOS or ATLL.     # Mature T-cell lymphoma  - Echo done 11/1/24 - EF 66%; appreciate cardiology team.   - Completed Cytoxan/Adrimaycin 11/2/24 without incident  - Pathology: neoplasm is positive for CD3, CD5, MUM-1, CD4, CD2, with aberrant loss of CD7. Neg: CD20, PAX-5, CD10, BCL-6, CD30, KAYLEY, cytokeratin. Ki67 80%.  CD25 is weak positive, which raises the possibility of adult T-cell lymphoma. Pending FOXP3 to support dx of ATCL. Other stains also show: Pos - PD-1 (weak), GATA3; Neg - CD8, CD56, ALK1    TREATMENT:  # Mature T-cell lymphoma  - Prednisone 100mg daily X5 days initiated 11/1/24 - 11/5/24. Keep patient on pantoprazole while on steroids. If hyperglycemic can request endocrine consult.  - Planned to give growth factor support filgastrim at 480mcg daily for 5 days (11/3/24 - 11/7/24). However, given rise in WBCst stopped filgastrim on 11/4/24.   - f/u HTLV-1 - if this is positive this is likely ATLL and will try to get patient to stem cell transplant once she has achieves a complete response with her current regimen.  - Patient was presented at lymphoma tumor board on 11/5/24 and it was agreed to add Brentuximab-Vedotin. Patient was consented and order given to [facility] Onc Pharmacist and Chemo nurse to be given 11/6/24. Will give pegfilgrastim on 11/7/24.  - Monitor tumor lysis labs Q12 hours and monitor airway. If uric acid > 8, give 3 mg IV rasburicase, and if uric acid > 12 give 6 mg rasburicase  - Continue allopurinol 300mg daily to prevent renal complications; monitor for rash  - Will follow closely. Further studies pending to confirm final diagnosis with hematopathology.  - Will [facility] upon hospital discharge. If patient tolerates treatment with BV-CHP, once she receives pegfilgrastim on 11/7/24, she will likely be cleared for discharge by hematology.    NOTE INCOMPLETE UNTIL ATTENDING SIGNS    ***************************************************************  Tanja Banks, PGY5  Fellow Hematology/Oncology  pager: [phone]   Available on Microsoft Teams  After 5pm or on weekends please contact  to page on-call fellow   ***************************************************************

## 2024-11-05 NOTE — PROGRESS NOTE ADULT - SUBJECTIVE AND OBJECTIVE BOX
Heme/Onc Progress Note    INTERVAL HPI/OVERNIGHT EVENTS:  No acute o/n events, patient resting comfortably. No complaints at this time. Patient denies fever, chills, dizziness, weakness, CP, palpitations, SOB, cough, N/V/D/C, dysuria, changes in bowel movements, LE edema.    MEDICATIONS  (STANDING):  allopurinol 300 milliGRAM(s) Oral daily  amLODIPine   Tablet 5 milliGRAM(s) Oral with breakfast  chlorhexidine 2% Cloths 1 Application(s) Topical daily  dextrose 5%. 1000 milliLiter(s) (50 mL/Hr) IV Continuous <Continuous>  dextrose 5%. 1000 milliLiter(s) (100 mL/Hr) IV Continuous <Continuous>  dextrose 50% Injectable 25 Gram(s) IV Push once  dextrose 50% Injectable 12.5 Gram(s) IV Push once  dextrose 50% Injectable 25 Gram(s) IV Push once  enoxaparin Injectable 40 milliGRAM(s) SubCutaneous every 24 hours  glucagon  Injectable 1 milliGRAM(s) IntraMuscular once  insulin glargine Injectable (LANTUS) 20 Unit(s) SubCutaneous at bedtime  insulin lispro (ADMELOG) corrective regimen sliding scale   SubCutaneous at bedtime  insulin lispro (ADMELOG) corrective regimen sliding scale   SubCutaneous three times a day before meals  insulin lispro Injectable (ADMELOG) 11 Unit(s) SubCutaneous three times a day before meals  pantoprazole    Tablet 40 milliGRAM(s) Oral before breakfast  predniSONE   Tablet 100 milliGRAM(s) Oral daily  sodium chloride 0.9%. 1000 milliLiter(s) (100 mL/Hr) IV Continuous <Continuous>    MEDICATIONS  (PRN):  acetaminophen     Tablet .. 650 milliGRAM(s) Oral every 6 hours PRN Temp greater or equal to 38C (100.4F), Mild Pain (1 - 3)  acetaminophen     Tablet .. 650 milliGRAM(s) Oral every 6 hours PRN PRN REACTION  aluminum hydroxide/magnesium hydroxide/simethicone Suspension 30 milliLiter(s) Oral every 4 hours PRN Dyspepsia  dextrose Oral Gel 15 Gram(s) Oral once PRN Blood Glucose LESS THAN 70 milliGRAM(s)/deciliter  diphenhydrAMINE Injectable 50 milliGRAM(s) IV Push once PRN REACTION  melatonin 3 milliGRAM(s) Oral at bedtime PRN Insomnia  ondansetron Injectable 4 milliGRAM(s) IV Push every 8 hours PRN Nausea and/or Vomiting      Allergies    No Known Allergies    Intolerances        VITAL SIGNS:  T(F): 98.2 (11-05-24 @ 05:15)  HR: 92 (11-05-24 @ 08:55)  BP: 143/75 (11-05-24 @ 08:55)  RR: 18 (11-05-24 @ 08:55)  SpO2: 97% (11-05-24 @ 08:55)  Wt(kg): --    PHYSICAL EXAM:  Constitutional: NAD  HEENT: non-icteric; moist mucous membranes, no mucositis  Neck: supple  Respiratory: CTA b/l, good air entry b/l  Cardiovascular: RRR, no M/R/G  Gastrointestinal: soft, NTND, no masses palpable, + BS  Extremities: no gross deformities   Neurological: nonfocal    LABS:                        12.8   35.76 )-----------( 289      ( 05 Nov 2024 10:03 )             39.0     11-04    141  |  102  |  10  ----------------------------<  142[H]  3.3[L]   |  30  |  0.64    Ca    8.6      04 Nov 2024 21:17  Phos  2.8     11-04  Mg     2.00     11-04    TPro  6.0  /  Alb  3.1[L]  /  TBili  0.3  /  DBili  x   /  AST  15  /  ALT  22  /  AlkPhos  93  11-04        RADIOLOGY & ADDITIONAL TESTS:   Heme/Onc Progress Note    INTERVAL HPI/OVERNIGHT EVENTS:  No acute o/n events, patient resting comfortably. No complaints at this time. Patient denies fever, chills, dizziness, weakness, CP, palpitations, SOB, cough, N/V/D/C, dysuria, changes in bowel movements, LE edema.    MEDICATIONS  (STANDING):  allopurinol 300 milliGRAM(s) Oral daily  amLODIPine   Tablet 5 milliGRAM(s) Oral with breakfast  chlorhexidine 2% Cloths 1 Application(s) Topical daily  dextrose 5%. 1000 milliLiter(s) (50 mL/Hr) IV Continuous <Continuous>  dextrose 5%. 1000 milliLiter(s) (100 mL/Hr) IV Continuous <Continuous>  dextrose 50% Injectable 25 Gram(s) IV Push once  dextrose 50% Injectable 12.5 Gram(s) IV Push once  dextrose 50% Injectable 25 Gram(s) IV Push once  enoxaparin Injectable 40 milliGRAM(s) SubCutaneous every 24 hours  glucagon  Injectable 1 milliGRAM(s) IntraMuscular once  insulin glargine Injectable (LANTUS) 20 Unit(s) SubCutaneous at bedtime  insulin lispro (ADMELOG) corrective regimen sliding scale   SubCutaneous at bedtime  insulin lispro (ADMELOG) corrective regimen sliding scale   SubCutaneous three times a day before meals  insulin lispro Injectable (ADMELOG) 11 Unit(s) SubCutaneous three times a day before meals  pantoprazole    Tablet 40 milliGRAM(s) Oral before breakfast  predniSONE   Tablet 100 milliGRAM(s) Oral daily  sodium chloride 0.9%. 1000 milliLiter(s) (100 mL/Hr) IV Continuous <Continuous>    MEDICATIONS  (PRN):  acetaminophen     Tablet .. 650 milliGRAM(s) Oral every 6 hours PRN Temp greater or equal to 38C (100.4F), Mild Pain (1 - 3)  acetaminophen     Tablet .. 650 milliGRAM(s) Oral every 6 hours PRN PRN REACTION  aluminum hydroxide/magnesium hydroxide/simethicone Suspension 30 milliLiter(s) Oral every 4 hours PRN Dyspepsia  dextrose Oral Gel 15 Gram(s) Oral once PRN Blood Glucose LESS THAN 70 milliGRAM(s)/deciliter  diphenhydrAMINE Injectable 50 milliGRAM(s) IV Push once PRN REACTION  melatonin 3 milliGRAM(s) Oral at bedtime PRN Insomnia  ondansetron Injectable 4 milliGRAM(s) IV Push every 8 hours PRN Nausea and/or Vomiting      Allergies    No Known Allergies    Intolerances        VITAL SIGNS:  T(F): 98.2 (11-05-24 @ 05:15)  HR: 92 (11-05-24 @ 08:55)  BP: 143/75 (11-05-24 @ 08:55)  RR: 18 (11-05-24 @ 08:55)  SpO2: 97% (11-05-24 @ 08:55)  Wt(kg): --    PHYSICAL EXAM:  Constitutional: NAD  HEENT: non-icteric; moist mucous membranes, no mucositis  Neck: supple  Respiratory: CTA b/l, good air entry b/l  Cardiovascular: RRR, no M/R/G  Gastrointestinal: soft, NTND, no masses palpable, + BS  Extremities: no gross deformities, right foot with erythema  Neurological: nonfocal    LABS:                        12.8   35.76 )-----------( 289      ( 05 Nov 2024 10:03 )             39.0     11-04    141  |  102  |  10  ----------------------------<  142[H]  3.3[L]   |  30  |  0.64    Ca    8.6      04 Nov 2024 21:17  Phos  2.8     11-04  Mg     2.00     11-04    TPro  6.0  /  Alb  3.1[L]  /  TBili  0.3  /  DBili  x   /  AST  15  /  ALT  22  /  AlkPhos  93  11-04        RADIOLOGY & ADDITIONAL TESTS:

## 2024-11-06 ENCOUNTER — TRANSCRIPTION ENCOUNTER (OUTPATIENT)
Age: 59
End: 2024-11-06

## 2024-11-06 LAB
ALBUMIN SERPL ELPH-MCNC: 3.2 G/DL — LOW (ref 3.3–5)
ALBUMIN SERPL ELPH-MCNC: 3.4 G/DL — SIGNIFICANT CHANGE UP (ref 3.3–5)
ALP SERPL-CCNC: 118 U/L — SIGNIFICANT CHANGE UP (ref 40–120)
ALP SERPL-CCNC: 119 U/L — SIGNIFICANT CHANGE UP (ref 40–120)
ALT FLD-CCNC: 20 U/L — SIGNIFICANT CHANGE UP (ref 4–33)
ALT FLD-CCNC: 24 U/L — SIGNIFICANT CHANGE UP (ref 4–33)
ANION GAP SERPL CALC-SCNC: 11 MMOL/L — SIGNIFICANT CHANGE UP (ref 7–14)
ANION GAP SERPL CALC-SCNC: 12 MMOL/L — SIGNIFICANT CHANGE UP (ref 7–14)
ANION GAP SERPL CALC-SCNC: 15 MMOL/L — HIGH (ref 7–14)
APTT BLD: 31.8 SEC — SIGNIFICANT CHANGE UP (ref 24.5–35.6)
AST SERPL-CCNC: 15 U/L — SIGNIFICANT CHANGE UP (ref 4–32)
AST SERPL-CCNC: 32 U/L — SIGNIFICANT CHANGE UP (ref 4–32)
BASOPHILS # BLD AUTO: 0 K/UL — SIGNIFICANT CHANGE UP (ref 0–0.2)
BASOPHILS NFR BLD AUTO: 0 % — SIGNIFICANT CHANGE UP (ref 0–2)
BILIRUB SERPL-MCNC: 0.3 MG/DL — SIGNIFICANT CHANGE UP (ref 0.2–1.2)
BILIRUB SERPL-MCNC: 0.5 MG/DL — SIGNIFICANT CHANGE UP (ref 0.2–1.2)
BUN SERPL-MCNC: 10 MG/DL — SIGNIFICANT CHANGE UP (ref 7–23)
BUN SERPL-MCNC: 8 MG/DL — SIGNIFICANT CHANGE UP (ref 7–23)
BUN SERPL-MCNC: 9 MG/DL — SIGNIFICANT CHANGE UP (ref 7–23)
CALCIUM SERPL-MCNC: 8.4 MG/DL — SIGNIFICANT CHANGE UP (ref 8.4–10.5)
CALCIUM SERPL-MCNC: 8.6 MG/DL — SIGNIFICANT CHANGE UP (ref 8.4–10.5)
CALCIUM SERPL-MCNC: 8.6 MG/DL — SIGNIFICANT CHANGE UP (ref 8.4–10.5)
CHLORIDE SERPL-SCNC: 101 MMOL/L — SIGNIFICANT CHANGE UP (ref 98–107)
CHLORIDE SERPL-SCNC: 102 MMOL/L — SIGNIFICANT CHANGE UP (ref 98–107)
CHLORIDE SERPL-SCNC: 99 MMOL/L — SIGNIFICANT CHANGE UP (ref 98–107)
CO2 SERPL-SCNC: 26 MMOL/L — SIGNIFICANT CHANGE UP (ref 22–31)
CO2 SERPL-SCNC: 26 MMOL/L — SIGNIFICANT CHANGE UP (ref 22–31)
CO2 SERPL-SCNC: 29 MMOL/L — SIGNIFICANT CHANGE UP (ref 22–31)
CREAT SERPL-MCNC: 0.4 MG/DL — LOW (ref 0.5–1.3)
CREAT SERPL-MCNC: 0.46 MG/DL — LOW (ref 0.5–1.3)
CREAT SERPL-MCNC: 0.47 MG/DL — LOW (ref 0.5–1.3)
EGFR: 111 ML/MIN/1.73M2 — SIGNIFICANT CHANGE UP
EGFR: 112 ML/MIN/1.73M2 — SIGNIFICANT CHANGE UP
EGFR: 115 ML/MIN/1.73M2 — SIGNIFICANT CHANGE UP
EOSINOPHIL # BLD AUTO: 0.2 K/UL — SIGNIFICANT CHANGE UP (ref 0–0.5)
EOSINOPHIL NFR BLD AUTO: 0.9 % — SIGNIFICANT CHANGE UP (ref 0–6)
GLUCOSE BLDC GLUCOMTR-MCNC: 123 MG/DL — HIGH (ref 70–99)
GLUCOSE BLDC GLUCOMTR-MCNC: 128 MG/DL — HIGH (ref 70–99)
GLUCOSE BLDC GLUCOMTR-MCNC: 181 MG/DL — HIGH (ref 70–99)
GLUCOSE BLDC GLUCOMTR-MCNC: 191 MG/DL — HIGH (ref 70–99)
GLUCOSE SERPL-MCNC: 230 MG/DL — HIGH (ref 70–99)
GLUCOSE SERPL-MCNC: 64 MG/DL — LOW (ref 70–99)
GLUCOSE SERPL-MCNC: 95 MG/DL — SIGNIFICANT CHANGE UP (ref 70–99)
HCT VFR BLD CALC: 35.5 % — SIGNIFICANT CHANGE UP (ref 34.5–45)
HGB BLD-MCNC: 12.1 G/DL — SIGNIFICANT CHANGE UP (ref 11.5–15.5)
IANC: 18.77 K/UL — HIGH (ref 1.8–7.4)
INR BLD: 1.04 RATIO — SIGNIFICANT CHANGE UP (ref 0.85–1.16)
LDH SERPL L TO P-CCNC: 334 U/L — HIGH (ref 135–225)
LDH SERPL L TO P-CCNC: 338 U/L — HIGH (ref 135–225)
LDH SERPL L TO P-CCNC: 519 U/L — HIGH (ref 135–225)
LYMPHOCYTES # BLD AUTO: 0.57 K/UL — LOW (ref 1–3.3)
LYMPHOCYTES # BLD AUTO: 2.6 % — LOW (ref 13–44)
MAGNESIUM SERPL-MCNC: 1.9 MG/DL — SIGNIFICANT CHANGE UP (ref 1.6–2.6)
MAGNESIUM SERPL-MCNC: 2 MG/DL — SIGNIFICANT CHANGE UP (ref 1.6–2.6)
MAGNESIUM SERPL-MCNC: 2 MG/DL — SIGNIFICANT CHANGE UP (ref 1.6–2.6)
MCHC RBC-ENTMCNC: 25.5 PG — LOW (ref 27–34)
MCHC RBC-ENTMCNC: 34.1 G/DL — SIGNIFICANT CHANGE UP (ref 32–36)
MCV RBC AUTO: 74.7 FL — LOW (ref 80–100)
MONOCYTES # BLD AUTO: 0.57 K/UL — SIGNIFICANT CHANGE UP (ref 0–0.9)
MONOCYTES NFR BLD AUTO: 2.6 % — SIGNIFICANT CHANGE UP (ref 2–14)
NEUTROPHILS # BLD AUTO: 20.49 K/UL — HIGH (ref 1.8–7.4)
NEUTROPHILS NFR BLD AUTO: 93.9 % — HIGH (ref 43–77)
PHOSPHATE SERPL-MCNC: 2.7 MG/DL — SIGNIFICANT CHANGE UP (ref 2.5–4.5)
PHOSPHATE SERPL-MCNC: 2.8 MG/DL — SIGNIFICANT CHANGE UP (ref 2.5–4.5)
PHOSPHATE SERPL-MCNC: 3.1 MG/DL — SIGNIFICANT CHANGE UP (ref 2.5–4.5)
PLATELET # BLD AUTO: 236 K/UL — SIGNIFICANT CHANGE UP (ref 150–400)
POTASSIUM SERPL-MCNC: 2.7 MMOL/L — CRITICAL LOW (ref 3.5–5.3)
POTASSIUM SERPL-MCNC: 3.9 MMOL/L — SIGNIFICANT CHANGE UP (ref 3.5–5.3)
POTASSIUM SERPL-MCNC: 4 MMOL/L — SIGNIFICANT CHANGE UP (ref 3.5–5.3)
POTASSIUM SERPL-SCNC: 2.7 MMOL/L — CRITICAL LOW (ref 3.5–5.3)
POTASSIUM SERPL-SCNC: 3.9 MMOL/L — SIGNIFICANT CHANGE UP (ref 3.5–5.3)
POTASSIUM SERPL-SCNC: 4 MMOL/L — SIGNIFICANT CHANGE UP (ref 3.5–5.3)
PROT SERPL-MCNC: 6.4 G/DL — SIGNIFICANT CHANGE UP (ref 6–8.3)
PROT SERPL-MCNC: 6.7 G/DL — SIGNIFICANT CHANGE UP (ref 6–8.3)
PROTHROM AB SERPL-ACNC: 12.1 SEC — SIGNIFICANT CHANGE UP (ref 9.9–13.4)
RBC # BLD: 4.75 M/UL — SIGNIFICANT CHANGE UP (ref 3.8–5.2)
RBC # FLD: 13.4 % — SIGNIFICANT CHANGE UP (ref 10.3–14.5)
SODIUM SERPL-SCNC: 137 MMOL/L — SIGNIFICANT CHANGE UP (ref 135–145)
SODIUM SERPL-SCNC: 142 MMOL/L — SIGNIFICANT CHANGE UP (ref 135–145)
SODIUM SERPL-SCNC: 142 MMOL/L — SIGNIFICANT CHANGE UP (ref 135–145)
URATE SERPL-MCNC: 2.1 MG/DL — LOW (ref 2.5–7)
URATE SERPL-MCNC: 2.8 MG/DL — SIGNIFICANT CHANGE UP (ref 2.5–7)
URATE SERPL-MCNC: 2.8 MG/DL — SIGNIFICANT CHANGE UP (ref 2.5–7)
WBC # BLD: 21.82 K/UL — HIGH (ref 3.8–10.5)
WBC # FLD AUTO: 21.82 K/UL — HIGH (ref 3.8–10.5)

## 2024-11-06 PROCEDURE — 74177 CT ABD & PELVIS W/CONTRAST: CPT | Mod: 26

## 2024-11-06 PROCEDURE — 99232 SBSQ HOSP IP/OBS MODERATE 35: CPT

## 2024-11-06 PROCEDURE — 71260 CT THORAX DX C+: CPT | Mod: 26

## 2024-11-06 PROCEDURE — 99232 SBSQ HOSP IP/OBS MODERATE 35: CPT | Mod: GC

## 2024-11-06 RX ORDER — DIPHENHYDRAMINE HCL 12.5MG/5ML
50 ELIXIR ORAL ONCE
Refills: 0 | Status: DISCONTINUED | OUTPATIENT
Start: 2024-11-06 | End: 2024-11-07

## 2024-11-06 RX ORDER — ENOXAPARIN SODIUM 40MG/0.4ML
40 SYRINGE (ML) SUBCUTANEOUS EVERY 24 HOURS
Refills: 0 | Status: DISCONTINUED | OUTPATIENT
Start: 2024-11-06 | End: 2024-11-07

## 2024-11-06 RX ORDER — INSULIN GLARGINE,HUM.REC.ANLOG 100/ML
16 VIAL (ML) SUBCUTANEOUS AT BEDTIME
Refills: 0 | Status: ACTIVE | OUTPATIENT
Start: 2024-11-06 | End: 2025-10-05

## 2024-11-06 RX ORDER — DIPHENHYDRAMINE HCL 12.5MG/5ML
50 ELIXIR ORAL ONCE
Refills: 0 | Status: COMPLETED | OUTPATIENT
Start: 2024-11-06 | End: 2024-11-06

## 2024-11-06 RX ORDER — BRENTUXIMAB VEDOTIN 50 MG/10.5ML
180 INJECTION, POWDER, LYOPHILIZED, FOR SOLUTION INTRAVENOUS ONCE
Refills: 0 | Status: COMPLETED | OUTPATIENT
Start: 2024-11-06 | End: 2024-11-06

## 2024-11-06 RX ORDER — POTASSIUM CHLORIDE 10 MEQ
40 TABLET, EXTENDED RELEASE ORAL EVERY 4 HOURS
Refills: 0 | Status: DISCONTINUED | OUTPATIENT
Start: 2024-11-06 | End: 2024-11-06

## 2024-11-06 RX ORDER — DEXAMETHASONE 1.5 MG 1.5 MG/1
4 TABLET ORAL ONCE
Refills: 0 | Status: DISCONTINUED | OUTPATIENT
Start: 2024-11-06 | End: 2024-11-07

## 2024-11-06 RX ORDER — DEXAMETHASONE 1.5 MG 1.5 MG/1
4 TABLET ORAL ONCE
Refills: 0 | Status: COMPLETED | OUTPATIENT
Start: 2024-11-06 | End: 2024-11-06

## 2024-11-06 RX ORDER — ACETAMINOPHEN 500 MG
650 TABLET ORAL EVERY 6 HOURS
Refills: 0 | Status: DISCONTINUED | OUTPATIENT
Start: 2024-11-06 | End: 2024-11-07

## 2024-11-06 RX ORDER — INSULIN LISPRO 100/ML
VIAL (ML) SUBCUTANEOUS
Refills: 0 | Status: DISCONTINUED | OUTPATIENT
Start: 2024-11-06 | End: 2024-11-07

## 2024-11-06 RX ORDER — PEGFILGRASTIM-CBQV 6 MG/.6ML
6 INJECTION, SOLUTION SUBCUTANEOUS ONCE
Refills: 0 | Status: COMPLETED | OUTPATIENT
Start: 2024-11-07 | End: 2024-11-07

## 2024-11-06 RX ORDER — SENNA 187 MG
2 TABLET ORAL AT BEDTIME
Refills: 0 | Status: DISCONTINUED | OUTPATIENT
Start: 2024-11-06 | End: 2024-11-07

## 2024-11-06 RX ORDER — POLYETHYLENE GLYCOL 3350 17 G/17G
17 POWDER, FOR SOLUTION ORAL DAILY
Refills: 0 | Status: DISCONTINUED | OUTPATIENT
Start: 2024-11-06 | End: 2024-11-07

## 2024-11-06 RX ORDER — ACETAMINOPHEN 500 MG
650 TABLET ORAL ONCE
Refills: 0 | Status: COMPLETED | OUTPATIENT
Start: 2024-11-06 | End: 2024-11-06

## 2024-11-06 RX ADMIN — Medication 40 MILLIEQUIVALENT(S): at 05:35

## 2024-11-06 RX ADMIN — Medication 1: at 12:51

## 2024-11-06 RX ADMIN — Medication 16 UNIT(S): at 22:34

## 2024-11-06 RX ADMIN — Medication 40 MILLIEQUIVALENT(S): at 01:54

## 2024-11-06 RX ADMIN — POLYETHYLENE GLYCOL 3350 17 GRAM(S): 17 POWDER, FOR SOLUTION ORAL at 12:06

## 2024-11-06 RX ADMIN — Medication 300 MILLIGRAM(S): at 12:07

## 2024-11-06 RX ADMIN — Medication 50 MILLIGRAM(S): at 12:06

## 2024-11-06 RX ADMIN — Medication 5 MILLIGRAM(S): at 08:12

## 2024-11-06 RX ADMIN — Medication 650 MILLIGRAM(S): at 12:03

## 2024-11-06 RX ADMIN — Medication 2 TABLET(S): at 22:44

## 2024-11-06 RX ADMIN — Medication 2: at 18:13

## 2024-11-06 RX ADMIN — Medication 40 MILLIGRAM(S): at 18:14

## 2024-11-06 RX ADMIN — PANTOPRAZOLE SODIUM 40 MILLIGRAM(S): 40 TABLET, DELAYED RELEASE ORAL at 08:12

## 2024-11-06 RX ADMIN — SODIUM CHLORIDE 100 MILLILITER(S): 9 INJECTION, SOLUTION INTRAMUSCULAR; INTRAVENOUS; SUBCUTANEOUS at 12:07

## 2024-11-06 RX ADMIN — CHLORHEXIDINE GLUCONATE 1 APPLICATION(S): 40 SOLUTION TOPICAL at 12:06

## 2024-11-06 RX ADMIN — Medication 8 UNIT(S): at 18:13

## 2024-11-06 RX ADMIN — Medication 8 UNIT(S): at 08:48

## 2024-11-06 RX ADMIN — DEXAMETHASONE 1.5 MG 4 MILLIGRAM(S): 1.5 TABLET ORAL at 12:05

## 2024-11-06 RX ADMIN — Medication 8 UNIT(S): at 12:51

## 2024-11-06 RX ADMIN — BRENTUXIMAB VEDOTIN 180 MILLIGRAM(S): 50 INJECTION, POWDER, LYOPHILIZED, FOR SOLUTION INTRAVENOUS at 13:00

## 2024-11-06 NOTE — DISCHARGE NOTE PROVIDER - NSDCCPTREATMENT_GEN_ALL_CORE_FT
PRINCIPAL PROCEDURE  Procedure: CT chest, abdomen and pelvis  Findings and Treatment: FINDINGS:  CHEST:  LUNGS AND LARGE AIRWAYS: Patent central airways. No pulmonary nodules.   Left upper lobe calcified granuloma.  PLEURA: No pleural effusion.  VESSELS: Within normal limits.  HEART: Heart size is normal. No pericardial effusion.  MEDIASTINUM AND REGINA: No lymphadenopathy.  CHEST WALL AND LOWER NECK: Previous left lower neck mass and left   supraclavicular lymph node are not visualized on this exam and may not be   included. Marked interval improvement in right axillary adenopathy.   Interval decrease in slightly enlarged left axillary lymph nodes.  ABDOMEN AND PELVIS:  LIVER: Within normal limits.  BILE DUCTS: Normal caliber.  GALLBLADDER: Within normal limits.  SPLEEN: Within normal limits.  PANCREAS: Within normal limits.  ADRENALS: Within normal limits.  KIDNEYS/URETERS: Within normal limits.  BLADDER: Within normal limits.  REPRODUCTIVE ORGANS: Uterus and adnexa within normal limits.  BOWEL: No bowel obstruction. Small hiatal hernia. No appendicitis.  PERITONEUM/RETROPERITONEUM: Trace free fluid in the pelvis.  VESSELS: Within normal limits.  LYMPH NODES: Previously visualized prominent pelvic, bilateral inguinal   and mesenteric lymph nodes are significantly improved.  ABDOMINAL WALL: Within normal limits.  BONES: Degenerative changes.  IMPRESSION:  Significant interval improvement in axillary lymphadenopathy and in the   previously visualized prominent pelvic and mesenteric lymph nodes. No new   abnormality.      SECONDARY PROCEDURE  Procedure: Complete transthoracic echocardiography (TTE)  Findings and Treatment: CONCLUSIONS:      1. Left ventricular cavity is normal in size. Left ventricular wall thickness is normal. Left ventricular systolic function is normal with an ejection fraction of 66 % by Grant's method of disks. There are no regional wall motion abnormalities seen.   2. Normal right ventricular cavity size and normal right ventricular systolic function.   3. Structurally normal mitral valve with normal leaflet excursion. There is mild mitral regurgitation.

## 2024-11-06 NOTE — CHART NOTE - NSCHARTNOTEFT_GEN_A_CORE
LP requested for patient by oncology team to r/o CNS involvement, first IPT and then neuro-IR consulted re: need for LP, currently no availability by either team for LP today.

## 2024-11-06 NOTE — DISCHARGE NOTE PROVIDER - NSDCCPCAREPLAN_GEN_ALL_CORE_FT
PRINCIPAL DISCHARGE DIAGNOSIS  Diagnosis: T-cell lymphoma  Assessment and Plan of Treatment: [facility] treatments.      SECONDARY DISCHARGE DIAGNOSES  Diagnosis: Benign essential HTN  Assessment and Plan of Treatment:     Diagnosis: Diabetes mellitus  Assessment and Plan of Treatment:      PRINCIPAL DISCHARGE DIAGNOSIS  Diagnosis: T-cell lymphoma  Assessment and Plan of Treatment: Follow-up with the [facility] for further treatments.      SECONDARY DISCHARGE DIAGNOSES  Diagnosis: Benign essential HTN  Assessment and Plan of Treatment:     Diagnosis: Diabetes mellitus  Assessment and Plan of Treatment: You will be discharged on trulicity, and metformin 500mg twice a day for 7 days. If you toelrate this dosage then you can begin taking metformin 1000mg twice a day. [facility] [place] [address] [place]  [address]: emailed office for an appointment on 11/7. please call [phone] to confirm your appointment     PRINCIPAL DISCHARGE DIAGNOSIS  Diagnosis: T-cell lymphoma  Assessment and Plan of Treatment: Follow-up with the [facility] for further treatments.      SECONDARY DISCHARGE DIAGNOSES  Diagnosis: Benign essential HTN  Assessment and Plan of Treatment: Take medications and follow-up with primary care.    Diagnosis: Diabetes mellitus  Assessment and Plan of Treatment: You will be discharged on trulicity, and metformin 500mg twice a day for 7 days. If you toelrate this dosage then you can begin taking metformin 1000mg twice a day. [facility] [place] [address] [place]  [address]: emailed office for an appointment on 11/7. please call [phone] to confirm your appointment

## 2024-11-06 NOTE — PROGRESS NOTE ADULT - SUBJECTIVE AND OBJECTIVE BOX
Patient is a 58y old  Female who presents with a chief complaint of initiation of Chemotherapy for possible T- Cell lymphoma (06 Nov 2024 10:40)    SUBJECTIVE / OVERNIGHT EVENTS:    no CP, SOB, f/c/n/v  feels constipated, no pain, tolerating diet, voiding  no new complaints  feels need to brush tongue often    MEDICATIONS  (STANDING):  allopurinol 300 milliGRAM(s) Oral daily  amLODIPine   Tablet 5 milliGRAM(s) Oral with breakfast  chlorhexidine 2% Cloths 1 Application(s) Topical daily  glucagon  Injectable 1 milliGRAM(s) IntraMuscular once  insulin glargine Injectable (LANTUS) 16 Unit(s) SubCutaneous at bedtime  insulin lispro (ADMELOG) corrective regimen sliding scale   SubCutaneous three times a day before meals  insulin lispro (ADMELOG) corrective regimen sliding scale   SubCutaneous at bedtime  insulin lispro Injectable (ADMELOG) 8 Unit(s) SubCutaneous three times a day before meals  pantoprazole    Tablet 40 milliGRAM(s) Oral before breakfast  polyethylene glycol 3350 17 Gram(s) Oral daily  senna 2 Tablet(s) Oral at bedtime  sodium chloride 0.9%. 1000 milliLiter(s) (100 mL/Hr) IV Continuous <Continuous>    MEDICATIONS  (PRN):  acetaminophen     Tablet .. 650 milliGRAM(s) Oral every 6 hours PRN PRN REACTION  acetaminophen     Tablet .. 650 milliGRAM(s) Oral every 6 hours PRN Temp greater or equal to 38C (100.4F), Mild Pain (1 - 3)  acetaminophen     Tablet .. 650 milliGRAM(s) Oral every 6 hours PRN chemotherapy reaction  aluminum hydroxide/magnesium hydroxide/simethicone Suspension 30 milliLiter(s) Oral every 4 hours PRN Dyspepsia  bisacodyl Suppository 10 milliGRAM(s) Rectal daily PRN Constipation  dexAMETHasone  Injectable 4 milliGRAM(s) IV Push once PRN chemotherapy reaction  dextrose Oral Gel 15 Gram(s) Oral once PRN Blood Glucose LESS THAN 70 milliGRAM(s)/deciliter  diphenhydrAMINE Injectable 50 milliGRAM(s) IV Push once PRN REACTION  diphenhydrAMINE Injectable 50 milliGRAM(s) IV Push once PRN chemotherapy reaction  melatonin 3 milliGRAM(s) Oral at bedtime PRN Insomnia  ondansetron Injectable 4 milliGRAM(s) IV Push every 8 hours PRN Nausea and/or Vomiting    T(C): 36.8 (11-06-24 @ 12:20), Max: 37 (11-05-24 @ 21:30)  HR: 89 (11-06-24 @ 12:20) (76 - 98)  BP: 152/67 (11-06-24 @ 12:20) (148/78 - 156/76)  RR: 17 (11-06-24 @ 12:20) (17 - 18)  SpO2: 96% (11-06-24 @ 12:20) (96% - 99%)    CAPILLARY BLOOD GLUCOSE  POCT Blood Glucose.: 191 mg/dL (06 Nov 2024 12:29)  POCT Blood Glucose.: 128 mg/dL (06 Nov 2024 08:19)  POCT Blood Glucose.: 107 mg/dL (05 Nov 2024 21:49)  POCT Blood Glucose.: 120 mg/dL (05 Nov 2024 17:40)    PHYSICAL EXAM:  GENERAL: NAD   NECK: L neck swelling   CHEST/LUNG: Clear to auscultation bilaterally; No wheeze  HEART: Regular rate and rhythm; No murmurs, rubs, or gallops  ABDOMEN: Soft, Nontender, Nondistended; Bowel sounds present  EXTREMITIES:   warm and well perfused, No clubbing, cyanosis, or edema  NEUROLOGY: non-focal    LABS:                        12.1   21.82 )-----------( 236      ( 06 Nov 2024 06:55 )             35.5     11-06    142  |  101  |  8   ----------------------------<  95  4.0   |  26  |  0.40[L]    Ca    8.4      06 Nov 2024 06:55  Phos  2.8     11-06  Mg     1.90     11-06    TPro  6.4  /  Alb  3.2[L]  /  TBili  0.5  /  DBili  x   /  AST  32  /  ALT  24  /  AlkPhos  118  11-06    PT/INR - ( 06 Nov 2024 06:55 )   PT: 12.1 sec;   INR: 1.04 ratio    PTT - ( 06 Nov 2024 06:55 )  PTT:31.8 sec    Care Discussed with Consultants/Other Providers: heme/onc fellow

## 2024-11-06 NOTE — DISCHARGE NOTE PROVIDER - CARE PROVIDERS DIRECT ADDRESSES
[email or web address]

## 2024-11-06 NOTE — PROGRESS NOTE ADULT - ASSESSMENT
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 No previous h/o diabetes but baseline A1c 11.9 c/w undiagnosed diabetes, assumed to be Type 2. BS above target on current regimen. Prefer to target BS gen'l < 180, but no indication for "tight"  control.     #Type 2 Diabetes Mellitus  - HbA1c 11.9  ; home regimen: none  -follows up with PCP  -egfr: 108    Plan:   -FS goal 100-180: hypoglycemia on serum this am. Slightly above goal for lunch. Pt received Dexamethasone 4mg ivp x 1 pre chemotherapy today. Last dose of pred 100mg was yesterday 11/5 which is equivalent to dexamethasone 15mg. Therefore agree with lower dose.   - Decrease Lantus to 16 units sq qhs . do not hold.   - Continue Admelog  8 units premeal TID AC (please hold if npo/not eating)  - Adjusted back to moderate Admelog correction scale TIDQAC as and separate moderate scale QHS ; once steroid out of pts system can change to low scale   **if steroid plan changes and pt going to be on more steroids please notify endocrine as this will **  - Please check FSG before meals and QHS, or q6h while NPO  - Please keep patient on a diabetic, carb controlled diet   - RD consult  - hypoglycemia orderset prn  - extensively discussed importance of glycemic control to prevent micro- and macrovascular complications  - discussed importance of weight loss, exercise, and changing diet   - reviewed symptoms and management of hypoglycemia  - reviewed basics of insulin administration and pharmacokinetics, glucometer monitoring, as well as glycemic goals for outpatient setting    - Discharge planning: likely glp-1  (trulicity- PA in progress) + metformin 500mg BID x 1 week and increase to 1000mg BID if tolerating after the 1 week.   Please send script for glucometer, test strips, lancets and alcohol swabs.   For severe hypoglycemia: Please prescribe Glucose tablets 4G (take 4 tablets) or 15G tablets for blood sugar less than 70 mG/dL repeat fingerstick in 15 minutes.   Please call your doctor if you fs is 70 or below and or 250 and above   Reviewed importance of medication adherence,  glucose monitoring, and following a consistent carb diet   Hypoglycemia and intervention reviewed  Please follow up with your pcp, podiatry, endocrinology, and opthalmology as an outpt   Pt would like to follow up with our office. - Patient to call [facility] at [place]:   [address]  [place] [address]   [phone]- added to our outpt follow up list to assist with scheduling an appointment.     #Hypertension  - BP goal <130/80  - Please obtain urine micro albumin cr ratio as an outpt   - on norvasc 5mg daily   - Management as per primary team    #Hyperlipidemia  - LDL goal <70  - LDL 58  -management as per primary team      Jael Watts  Nurse Practitioner  Division of Endocrinology & Diabetes  In house pager #[phone]    If before 9AM or after 6PM, or on weekends/holidays, please call endocrine answering service for assistance ([phone]).For nonurgent matters email [email or web address] for assistance.

## 2024-11-06 NOTE — DISCHARGE NOTE PROVIDER - HOSPITAL COURSE
58F non-smoker, no prior medical history, recent admit end of Oct for hoarse voice, L neck swelling and dysphagia x1 month with CT showing  5.0 x 3.4 x 3.0 cm mass in the region of the left palatine tonsil abutting BOT and lingual surface of epiglottis with bilateral cervical LAD, suspicious for malignancy. ENT exam significant for large L tonsillar mass, R lateral tongue lesion, and left levels II-IV LAD, and scope shows airway is widely patent s/p bedside bx of R lateral tongue and L tonsillar mass.  Per heme opathology mature T cell lymphoma called back for tx      Hospital Course:  Admitted to  and followed by the hematology/oncology team.  For the Mature large T -cell lymphoma treated Prednisone 100mg daily x 5d 11/1-11/5/24 followed by Cytoxan/Adrimaycin 11/2/24, filgrastim at 480mcg daily for 2 days (11/3/24 - 11/4/24), and Brentuximab-Vedotin 11/6 and pegfilgrastim on 11/7/24. On allopurinol 300 mg.  No e/o tumor lysis.   TTE normal EF. Noted to have new onset DM2, HbA11.9, followed by endocrine and started on medications.  HTLV-1 and 11 testing pending at time of dc.     OP  f/u with  Dr. Womack in 1-2 weeks. She will need her second treatment at [facility].   58F non-smoker, no prior medical history, recent admit end of Oct for hoarse voice, L neck swelling and dysphagia x1 month with CT showing  5.0 x 3.4 x 3.0 cm mass in the region of the left palatine tonsil abutting BOT and lingual surface of epiglottis with bilateral cervical LAD, suspicious for malignancy. ENT exam significant for large L tonsillar mass, R lateral tongue lesion, and left levels II-IV LAD, and scope shows airway is widely patent s/p bedside bx of R lateral tongue and L tonsillar mass.  Per heme opathology mature T cell lymphoma called back for tx      Hospital Course:  Admitted to  and followed by the hematology/oncology team.  For the Mature large T -cell lymphoma treated Prednisone 100mg daily x 5d 11/1-11/5/24 followed by Cytoxan/Adrimaycin 11/2/24, filgrastim at 480mcg daily for 2 days (11/3/24 - 11/4/24), and Brentuximab-Vedotin 11/6 and pegfilgrastim on 11/7/24. On allopurinol 300 mg.  No e/o tumor lysis.   TTE normal EF. Noted to have new onset DM2, HbA11.9, followed by endocrine and started on basal/bolus here and for dc trulicity (got PA) and metformin, had teaching.  HTLV-1 and 11 testing pending at time of dc.     OP  f/u with  Dr. Womack in 1-2 weeks; second treatment at [facility].

## 2024-11-06 NOTE — DISCHARGE NOTE PROVIDER - CARE PROVIDER_API CALL
Zachary Womack  [address] [address]  Phone: [phone]  Fax: [phone]  Follow Up Time:    Zachary Womack  [address] [address]  Phone: [phone]  Fax: [phone]  Follow Up Time:     Jael Watts NP in Adult Health  [address] [place] [id number]  Phone: [phone]  Fax: [phone]  Follow Up Time:

## 2024-11-06 NOTE — PROGRESS NOTE ADULT - SUBJECTIVE AND OBJECTIVE BOX
Chief Complaint: Type 2 DM on steroids    History: Pt seen at bedside. Pt tolerating oral diet. Pt denies nausea and vomiting/any signs of hypoglycemia. Pt reports an adequate appetite.     MEDICATIONS  (STANDING):  allopurinol 300 milliGRAM(s) Oral daily  amLODIPine   Tablet 5 milliGRAM(s) Oral with breakfast  chlorhexidine 2% Cloths 1 Application(s) Topical daily  dextrose 50% Injectable 25 Gram(s) IV Push once  dextrose 50% Injectable 12.5 Gram(s) IV Push once  dextrose 50% Injectable 25 Gram(s) IV Push once  glucagon  Injectable 1 milliGRAM(s) IntraMuscular once  insulin glargine Injectable (LANTUS) 16 Unit(s) SubCutaneous at bedtime  insulin lispro (ADMELOG) corrective regimen sliding scale   SubCutaneous three times a day before meals  insulin lispro (ADMELOG) corrective regimen sliding scale   SubCutaneous at bedtime  insulin lispro Injectable (ADMELOG) 8 Unit(s) SubCutaneous three times a day before meals  pantoprazole    Tablet 40 milliGRAM(s) Oral before breakfast  polyethylene glycol 3350 17 Gram(s) Oral daily  senna 2 Tablet(s) Oral at bedtime  sodium chloride 0.9%. 1000 milliLiter(s) (100 mL/Hr) IV Continuous <Continuous>    MEDICATIONS  (PRN):  acetaminophen     Tablet .. 650 milliGRAM(s) Oral every 6 hours PRN Temp greater or equal to 38C (100.4F), Mild Pain (1 - 3)  acetaminophen     Tablet .. 650 milliGRAM(s) Oral every 6 hours PRN chemotherapy reaction  acetaminophen     Tablet .. 650 milliGRAM(s) Oral every 6 hours PRN PRN REACTION  aluminum hydroxide/magnesium hydroxide/simethicone Suspension 30 milliLiter(s) Oral every 4 hours PRN Dyspepsia  bisacodyl Suppository 10 milliGRAM(s) Rectal daily PRN Constipation  dexAMETHasone  Injectable 4 milliGRAM(s) IV Push once PRN chemotherapy reaction  dextrose Oral Gel 15 Gram(s) Oral once PRN Blood Glucose LESS THAN 70 milliGRAM(s)/deciliter  diphenhydrAMINE Injectable 50 milliGRAM(s) IV Push once PRN REACTION  diphenhydrAMINE Injectable 50 milliGRAM(s) IV Push once PRN chemotherapy reaction  melatonin 3 milliGRAM(s) Oral at bedtime PRN Insomnia  ondansetron Injectable 4 milliGRAM(s) IV Push every 8 hours PRN Nausea and/or Vomiting      Allergies: No Known Allergies      Review of Systems:  Respiratory: No SOB, no cough  GI: No nausea, vomiting, abdominal pain  Endocrine: no polyuria, polydipsia    PHYSICAL EXAM:  VITALS: T(C): 36.8 (11-06-24 @ 12:20)  T(F): 98.3 (11-06-24 @ 12:20), Max: 98.6 (11-05-24 @ 21:30)  HR: 89 (11-06-24 @ 12:20) (76 - 98)  BP: 152/67 (11-06-24 @ 12:20) (148/78 - 156/76)  RR:  (17 - 18)  SpO2:  (96% - 99%)  Wt(kg): --  GENERAL: NAD, well-groomed, well-developed  RESPIRATORY: NO labored breathing   GI: Soft, nontender, non distended  PSYCH: Alert and oriented x 3, normal affect, normal mood      CAPILLARY BLOOD GLUCOSE  POCT Blood Glucose.: 191 mg/dL (06 Nov 2024 12:29)  POCT Blood Glucose.: 128 mg/dL (06 Nov 2024 08:19)  POCT Blood Glucose.: 107 mg/dL (05 Nov 2024 21:49)  POCT Blood Glucose.: 120 mg/dL (05 Nov 2024 17:40)    A1C with Estimated Average Glucose in AM (11.02.24 @ 16:47)    A1C with Estimated Average Glucose Result: 11.9   Estimated Average Glucose: 295      11-06    142  |  101  |  8   ----------------------------<  95  4.0   |  26  |  0.40[L]    eGFR: 115    Ca    8.4      11-06  Mg     1.90     11-06  Phos  2.8     11-06    TPro  6.4  /  Alb  3.2[L]  /  TBili  0.5  /  DBili  x   /  AST  32  /  ALT  24  /  AlkPhos  118  11-06    Diet, Regular:   Consistent Carbohydrate Evening Snack (CSTCHOSN)  DASH/TLC Sodium & Cholesterol Restricted (DASH)  Soft and Bite Sized (SOFTBTSZ)  Supplement Feeding Modality:  Oral  Ensure Max Cans or Servings Per Day:  1       Frequency:  Daily (11-04-24 @ 17:01) [Active]

## 2024-11-06 NOTE — PROVIDER CONTACT NOTE (CRITICAL VALUE NOTIFICATION) - DATE AND TIME:
Received report from night nurse. Pt has no sign of distress. Safety precautions are maintained with bed alarm set, bed in lowest position, bed wheels locked, and call light in reach.    06-Nov-2024 01:30 06-Nov-2024 03:45

## 2024-11-06 NOTE — PROVIDER CONTACT NOTE (CRITICAL VALUE NOTIFICATION) - BACKGROUND
58 yr old female with a pmh of recently diagnosed HTN and neck swelling with a left palatine tonsil mass concerning for T-Cell lymphoma.

## 2024-11-06 NOTE — CHART NOTE - NSCHARTNOTEFT_GEN_A_CORE
58 yr old female with a pmh of recently diagnosed HTN and neck swelling with a left palatine tonsil mass concerning for T-Cell lymphoma. IPT consulted for LP to evaluate for CNS involvement. Patient assessed at bedside. Patient with BMI 37.5, found to have significant posterior adipose tissue making manually identifying necessary landmarks challenging. POCUS used to identify location of spinous processes, which were found to be at an increased depth from skin level. Therefore, unable to perform procedure without further image guidance and increased needle length. Procedure aborted prior to needle insertion.    Recommendations:  - consult neuro IR for imaging guided LP Invasive Procedure Team Consult    58 yr old female with a pmh of recently diagnosed HTN and neck swelling with a left palatine tonsil mass concerning for T-Cell lymphoma. IPT consulted for LP to evaluate for CNS involvement. Patient assessed at bedside. Patient with BMI 37.5, found to have significant posterior adipose tissue making manually identifying necessary landmarks challenging. POCUS used to identify location of spinous processes, which were found to be at an increased depth from skin level. Therefore, unable to perform procedure without further image guidance and increased needle length. Procedure aborted prior to needle insertion.    Recommendations:  - consult neuro IR for imaging guided LP

## 2024-11-06 NOTE — PROGRESS NOTE ADULT - ASSESSMENT
59 yo woman presented initially on 10/25/24 with large left sided neck/tonsillar mass and after biopsy by ENT was deemed stable by their service for discharge; path resulted as lymphoma (suspected T-Cell) and patient called back to return to hospital to initiate chemotherapy given location of her disease and concern for airway compromise. Current differential dx is PTCL-NOS or ATLL.     # Mature T-cell lymphoma  - Echo done 11/1/24 - EF 66%; appreciate cardiology team.   - Completed Cytoxan/Adrimaycin 11/2/24 without incident  - Pathology: neoplasm is positive for CD3, CD5, MUM-1, CD4, CD2, with aberrant loss of CD7. Neg: CD20, PAX-5, CD10, BCL-6, CD30, KAYLEY, cytokeratin. Ki67 80%.  CD25 is weak positive, which raises the possibility of adult T-cell lymphoma. Pending FOXP3 to support dx of ATCL. Other stains also show: Pos - PD-1 (weak), GATA3; Neg - CD8, CD56, ALK1    TREATMENT:  # Mature T-cell lymphoma  - Prednisone 100mg daily X5 days initiated 11/1/24 - 11/5/24. Keep patient on pantoprazole while on steroids. If hyperglycemic can request endocrine consult.  - Planned to give growth factor support filgastrim at 480mcg daily for 5 days (11/3/24 - 11/7/24). However, given rise in WBCst stopped filgastrim on 11/4/24.   - f/u HTLV-1 - if this is positive this is likely ATLL and will try to get patient to stem cell transplant once she has achieves a complete response with her current regimen.  - Patient was presented at lymphoma tumor board on 11/5/24 and it was agreed to add Brentuximab-Vedotin. Patient was consented on 11/5/24.  >> Plan to give Brentuximab 1.8 mg/kg today, 11/6/24 and then will give pegfilgrastim on 11/7/24  >> If tolerates treatment well, no hematologic contraindications for discharge after administration of pegfilgrastim  - Monitor tumor lysis labs Q12 hours and monitor airway. If uric acid > 8, give 3 mg IV rasburicase, and if uric acid > 12 give 6 mg rasburicase  - Continue allopurinol 300mg daily to prevent renal complications; monitor for rash  - Will follow closely. Further studies pending to confirm final diagnosis with hematopathology.  - Will set up with hematologist at [facility] upon hospital discharge. If patient tolerates treatment with BV-CHP, once she receives pegfilgrastim on 11/7/24, she will likely be cleared for discharge by hematology.    NOTE INCOMPLETE UNTIL ATTENDING SIGNS    Patient seen and examined with Dr. Gonzalez.  Kera Bustamante DO, MPH  Medical Oncology Fellow  **Can be reached via TEAMS, but please contact the on-call fellow after 5opm-8am on weekdays and on weekends. 59 yo woman presented initially on 10/25/24 with large left sided neck/tonsillar mass and after biopsy by ENT was deemed stable by their service for discharge; path resulted as lymphoma (suspected T-Cell) and patient called back to return to hospital to initiate chemotherapy given location of her disease and concern for airway compromise. Current differential dx is PTCL-NOS or ATLL.     # Mature T-cell lymphoma  - Echo done 11/1/24 - EF 66%; appreciate cardiology team.   - Completed Cytoxan/Adrimaycin 11/2/24 without incident  - Pathology: neoplasm is positive for CD3, CD5, MUM-1, CD4, CD2, with aberrant loss of CD7. Neg: CD20, PAX-5, CD10, BCL-6, CD30, KAYLEY, cytokeratin. Ki67 80%.  CD25 is weak positive, which raises the possibility of adult T-cell lymphoma. Pending FOXP3 to support dx of ATCL. Other stains also show: Pos - PD-1 (weak), GATA3; Neg - CD8, CD56, ALK1    TREATMENT:  # Mature T-cell lymphoma  - Prednisone 100mg daily X5 days initiated 11/1/24 - 11/5/24. Keep patient on pantoprazole while on steroids. If hyperglycemic can request endocrine consult.  - Planned to give growth factor support filgastrim at 480mcg daily for 5 days (11/3/24 - 11/7/24). However, given rise in WBCst stopped filgastrim on 11/4/24.   - f/u HTLV-1 - if this is positive this is likely ATLL and will try to get patient to stem cell transplant once she has achieves a complete response with her current regimen.  - Patient was presented at lymphoma tumor board on 11/5/24 and it was agreed to add Brentuximab-Vedotin. Patient was consented on 11/5/24.  >> Plan to give Brentuximab 1.8 mg/kg today, 11/6/24 and then will give pegfilgrastim on 11/7/24  >> If tolerates treatment well, no hematologic contraindications for discharge after administration of pegfilgrastim  - Monitor tumor lysis labs Q12 hours and monitor airway. If uric acid > 8, give 3 mg IV rasburicase, and if uric acid > 12 give 6 mg rasburicase  - Continue allopurinol 300mg daily to prevent renal complications; monitor for rash  - Will follow closely. Further studies pending to confirm final diagnosis with hematopathology.  - Will set up with hematologist at [facility] upon hospital discharge. If patient tolerates treatment with BV-CHP, once she receives pegfilgrastim on 11/7/24, she will likely be cleared for discharge by hematology.  - Please call daughter [phone] to advise plan for discharge to home after peg-filgrastim 11/7/24. [facility] will call to schedule appointment with Dr. Womakc in 1-2 weeks. She will need her second treatment at [facility].    NOTE INCOMPLETE UNTIL ATTENDING SIGNS    Patient seen and examined with Dr. oGnzalez.  Kera Bustamante DO, MPH  Medical Oncology Fellow  **Can be reached via TEAMS, but please contact the on-call fellow after 5opm-8am on weekdays and on weekends.

## 2024-11-06 NOTE — DISCHARGE NOTE PROVIDER - NSDCMRMEDTOKEN_GEN_ALL_CORE_FT
amLODIPine 5 mg oral tablet: 1 tab(s) orally once a day  dulaglutide 0.75 mg/0.5 mL subcutaneous solution: 0.75 milligram(s) subcutaneously once a week  semaglutide 0.25 mg/0.5 mL (0.25 mg dose) subcutaneous solution: 0.25 milligram(s) subcutaneously once a week   amLODIPine 5 mg oral tablet: 1 tab(s) orally once a day  dulaglutide 0.75 mg/0.5 mL subcutaneous solution: 0.75 milligram(s) subcutaneously once a week  metFORMIN 1000 mg oral tablet: 1 tab(s) orally 2 times a day Only begin taking after completing Metformin 500mg twice a day and you tolerate it.  metFORMIN 500 mg oral tablet: 1 tab(s) orally 2 times a day  semaglutide 0.25 mg/0.5 mL (0.25 mg dose) subcutaneous solution: 0.25 milligram(s) subcutaneously once a week   allopurinol 300 mg oral tablet: 1 tab(s) orally once a day  amLODIPine 5 mg oral tablet: 1 tab(s) orally once a day  dulaglutide 0.75 mg/0.5 mL subcutaneous solution: 0.75 milligram(s) subcutaneously once a week  metFORMIN 1000 mg oral tablet: 1 tab(s) orally 2 times a day Only begin taking after completing Metformin 500mg twice a day and you tolerate it.  metFORMIN 500 mg oral tablet: 1 tab(s) orally 2 times a day  semaglutide 0.25 mg/0.5 mL (0.25 mg dose) subcutaneous solution: 0.25 milligram(s) subcutaneously once a week   allopurinol 300 mg oral tablet: 1 tab(s) orally once a day  amLODIPine 5 mg oral tablet: 1 tab(s) orally once a day  dulaglutide 0.75 mg/0.5 mL subcutaneous solution: 0.75 milligram(s) subcutaneously once a week  metFORMIN 1000 mg oral tablet: 1 tab(s) orally 2 times a day Only begin taking after completing Metformin 500mg twice a day and you tolerate it.  metFORMIN 500 mg oral tablet: 1 tab(s) orally 2 times a day   alcohol swabs: Apply topically to affected area once a week with the trulicity pen.  allopurinol 300 mg oral tablet: 1 tab(s) orally once a day  amLODIPine 5 mg oral tablet: 1 tab(s) orally once a day  dulaglutide 0.75 mg/0.5 mL subcutaneous solution: 0.75 milligram(s) subcutaneously once a week  glucometer (per patient&#x27;s insurance): Test blood sugars once times a day. Dispense #1 glucometer.  Insulin Pen Needles, 4mm: 1 application subcutaneously once week. ** Use with Trulicity pen **  lancets: 1 application subcutaneously once a day  metFORMIN 500 mg oral tablet: 1 tab(s) orally 2 times a day Please take 500mg twice a day for 7 days and if you tolerate that dosage increase to 1000mg (two tablets) twice a day.  test strips (per patient&#x27;s insurance): 1 application subcutaneously once a day. ** Compatible with patient&#x27;s glucometer **

## 2024-11-06 NOTE — DISCHARGE NOTE PROVIDER - REASON FOR ADMISSION
initiation of Chemotherapy for T- Cell lymphoma initiation of Chemotherapy for possible T- Cell lymphoma

## 2024-11-06 NOTE — PROGRESS NOTE ADULT - SUBJECTIVE AND OBJECTIVE BOX
Heme/Onc Progress Note    INTERVAL HPI/OVERNIGHT EVENTS:  No acute o/n events, patient resting comfortably. No complaints at this time.     MEDICATIONS  (STANDING):  acetaminophen     Tablet .. 650 milliGRAM(s) Oral once  allopurinol 300 milliGRAM(s) Oral daily  amLODIPine   Tablet 5 milliGRAM(s) Oral with breakfast  brentuximab vedotin IVPB (eMAR) 180 milliGRAM(s) IV Intermittent once  chlorhexidine 2% Cloths 1 Application(s) Topical daily  dexAMETHasone  Injectable 4 milliGRAM(s) IV Push once  dextrose 50% Injectable 25 Gram(s) IV Push once  dextrose 50% Injectable 12.5 Gram(s) IV Push once  dextrose 50% Injectable 25 Gram(s) IV Push once  diphenhydrAMINE Injectable 50 milliGRAM(s) IV Push once  glucagon  Injectable 1 milliGRAM(s) IntraMuscular once  insulin glargine Injectable (LANTUS) 16 Unit(s) SubCutaneous at bedtime  insulin lispro (ADMELOG) corrective regimen sliding scale   SubCutaneous three times a day before meals  insulin lispro (ADMELOG) corrective regimen sliding scale   SubCutaneous at bedtime  insulin lispro Injectable (ADMELOG) 8 Unit(s) SubCutaneous three times a day before meals  pantoprazole    Tablet 40 milliGRAM(s) Oral before breakfast  polyethylene glycol 3350 17 Gram(s) Oral daily  senna 2 Tablet(s) Oral at bedtime  sodium chloride 0.9%. 1000 milliLiter(s) (100 mL/Hr) IV Continuous <Continuous>    MEDICATIONS  (PRN):  acetaminophen     Tablet .. 650 milliGRAM(s) Oral every 6 hours PRN PRN REACTION  acetaminophen     Tablet .. 650 milliGRAM(s) Oral every 6 hours PRN Temp greater or equal to 38C (100.4F), Mild Pain (1 - 3)  acetaminophen     Tablet .. 650 milliGRAM(s) Oral every 6 hours PRN chemotherapy reaction  aluminum hydroxide/magnesium hydroxide/simethicone Suspension 30 milliLiter(s) Oral every 4 hours PRN Dyspepsia  bisacodyl Suppository 10 milliGRAM(s) Rectal daily PRN Constipation  dexAMETHasone  Injectable 4 milliGRAM(s) IV Push once PRN chemotherapy reaction  dextrose Oral Gel 15 Gram(s) Oral once PRN Blood Glucose LESS THAN 70 milliGRAM(s)/deciliter  diphenhydrAMINE Injectable 50 milliGRAM(s) IV Push once PRN REACTION  diphenhydrAMINE Injectable 50 milliGRAM(s) IV Push once PRN chemotherapy reaction  melatonin 3 milliGRAM(s) Oral at bedtime PRN Insomnia  ondansetron Injectable 4 milliGRAM(s) IV Push every 8 hours PRN Nausea and/or Vomiting      Allergies    No Known Allergies    Intolerances        VITAL SIGNS:  T(F): 98.4 (11-06-24 @ 08:10)  HR: 76 (11-06-24 @ 08:10)  BP: 156/76 (11-06-24 @ 08:10)  RR: 18 (11-06-24 @ 08:10)  SpO2: 99% (11-06-24 @ 08:10)  Wt(kg): --    PHYSICAL EXAM:  Constitutional: NAD  HEENT: non-icteric; moist mucous membranes, no mucositis  Neck: supple  Respiratory: CTA b/l, good air entry b/l  Cardiovascular: RRR, no M/R/G  Gastrointestinal: soft, NTND, no masses palpable, + BS  Extremities: no gross deformities   Neurological: nonfocal    LABS:                        12.1   21.82 )-----------( 236      ( 06 Nov 2024 06:55 )             35.5     11-06    142  |  101  |  8   ----------------------------<  95  4.0   |  26  |  0.40[L]    Ca    8.4      06 Nov 2024 06:55  Phos  2.8     11-06  Mg     1.90     11-06    TPro  6.4  /  Alb  3.2[L]  /  TBili  0.5  /  DBili  x   /  AST  32  /  ALT  24  /  AlkPhos  118  11-06    PT/INR - ( 06 Nov 2024 06:55 )   PT: 12.1 sec;   INR: 1.04 ratio         PTT - ( 06 Nov 2024 06:55 )  PTT:31.8 sec    RADIOLOGY & ADDITIONAL TESTS:   Heme/Onc Progress Note    INTERVAL HPI/OVERNIGHT EVENTS:  No acute o/n events, patient resting comfortably. No complaints at this time.     MEDICATIONS  (STANDING):  acetaminophen     Tablet .. 650 milliGRAM(s) Oral once  allopurinol 300 milliGRAM(s) Oral daily  amLODIPine   Tablet 5 milliGRAM(s) Oral with breakfast  brentuximab vedotin IVPB (eMAR) 180 milliGRAM(s) IV Intermittent once  chlorhexidine 2% Cloths 1 Application(s) Topical daily  dexAMETHasone  Injectable 4 milliGRAM(s) IV Push once  dextrose 50% Injectable 25 Gram(s) IV Push once  dextrose 50% Injectable 12.5 Gram(s) IV Push once  dextrose 50% Injectable 25 Gram(s) IV Push once  diphenhydrAMINE Injectable 50 milliGRAM(s) IV Push once  glucagon  Injectable 1 milliGRAM(s) IntraMuscular once  insulin glargine Injectable (LANTUS) 16 Unit(s) SubCutaneous at bedtime  insulin lispro (ADMELOG) corrective regimen sliding scale   SubCutaneous three times a day before meals  insulin lispro (ADMELOG) corrective regimen sliding scale   SubCutaneous at bedtime  insulin lispro Injectable (ADMELOG) 8 Unit(s) SubCutaneous three times a day before meals  pantoprazole    Tablet 40 milliGRAM(s) Oral before breakfast  polyethylene glycol 3350 17 Gram(s) Oral daily  senna 2 Tablet(s) Oral at bedtime  sodium chloride 0.9%. 1000 milliLiter(s) (100 mL/Hr) IV Continuous <Continuous>    MEDICATIONS  (PRN):  acetaminophen     Tablet .. 650 milliGRAM(s) Oral every 6 hours PRN PRN REACTION  acetaminophen     Tablet .. 650 milliGRAM(s) Oral every 6 hours PRN Temp greater or equal to 38C (100.4F), Mild Pain (1 - 3)  acetaminophen     Tablet .. 650 milliGRAM(s) Oral every 6 hours PRN chemotherapy reaction  aluminum hydroxide/magnesium hydroxide/simethicone Suspension 30 milliLiter(s) Oral every 4 hours PRN Dyspepsia  bisacodyl Suppository 10 milliGRAM(s) Rectal daily PRN Constipation  dexAMETHasone  Injectable 4 milliGRAM(s) IV Push once PRN chemotherapy reaction  dextrose Oral Gel 15 Gram(s) Oral once PRN Blood Glucose LESS THAN 70 milliGRAM(s)/deciliter  diphenhydrAMINE Injectable 50 milliGRAM(s) IV Push once PRN REACTION  diphenhydrAMINE Injectable 50 milliGRAM(s) IV Push once PRN chemotherapy reaction  melatonin 3 milliGRAM(s) Oral at bedtime PRN Insomnia  ondansetron Injectable 4 milliGRAM(s) IV Push every 8 hours PRN Nausea and/or Vomiting      Allergies    No Known Allergies    Intolerances        VITAL SIGNS:  T(F): 98.4 (11-06-24 @ 08:10)  HR: 76 (11-06-24 @ 08:10)  BP: 156/76 (11-06-24 @ 08:10)  RR: 18 (11-06-24 @ 08:10)  SpO2: 99% (11-06-24 @ 08:10)  Wt(kg): --    PHYSICAL EXAM:  Constitutional: NAD  HEENT: non-icteric; moist mucous membranes, no mucositis  Neck: supple  Respiratory: CTA b/l, good air entry b/l  Cardiovascular: RRR, no M/R/G  Gastrointestinal: soft, NTND, no masses palpable, + BS  Extremities: no gross deformities, right foot with erythema  Neurological: nonfocal      LABS:                        12.1   21.82 )-----------( 236      ( 06 Nov 2024 06:55 )             35.5     11-06    142  |  101  |  8   ----------------------------<  95  4.0   |  26  |  0.40[L]    Ca    8.4      06 Nov 2024 06:55  Phos  2.8     11-06  Mg     1.90     11-06    TPro  6.4  /  Alb  3.2[L]  /  TBili  0.5  /  DBili  x   /  AST  32  /  ALT  24  /  AlkPhos  118  11-06    PT/INR - ( 06 Nov 2024 06:55 )   PT: 12.1 sec;   INR: 1.04 ratio         PTT - ( 06 Nov 2024 06:55 )  PTT:31.8 sec    RADIOLOGY & ADDITIONAL TESTS:

## 2024-11-06 NOTE — DISCHARGE NOTE PROVIDER - PROVIDER TOKENS
PROVIDER:[TOKEN:[[id number]]] PROVIDER:[TOKEN:[[id number]]],PROVIDER:[TOKEN:[[id number]]]

## 2024-11-06 NOTE — PROGRESS NOTE ADULT - ATTENDING COMMENTS
Responding well to treatment and voice is improved.  May discharge after GCSF injection tomorrow.  Will need blood counts as follow up after discharge.    Seth Gonzalez MD  Hematology Attending
Case and pathology reviewed at Lymphoma tumor board.  Will proceed with the addition of brentuximab vedotin, as opposed to etoposide.  This is based on emerging data regarding the benefit of this anti-CD30 therapy in a variety of T cell lymphomas.  We remain unsure if this lymphoma is HTLV related, and await lab testing confirmation.  Treatment has been explained to patient and will be administered tomorrow.    Seth Gonzalez MD  Hematology Attending
Case reviewed and more data coming in regarding pathology.  Patient will need to complete induction chemotherapy with etoposide and vincristine as described in the regimen CHOEP.  Will review at lymphoma tumor board tomorrow prior to final recommendation.  We did review the treatment plan in detail with the patient as well.  Given T cell lymphoma and patient from [place] background, will also obtain HTLV testing, as a positive result will impact treatment decision especially with respect to the use of allogeneic BMT.    Seth Gonzalez MD  Hematology Attending
---------------------------------------------------------------------------------------------  Patient seen and examined on rounds with heme/onc Fellow (Dr. Spencer)  Briefly patient presented on 10/25/24 with large left sided neck/tonsillar mass and after biopsy by ENT was deemed stable by their service for discharge; path resulted as lymphoma (suspected T-Cell) and patient called back to return to hospital to initiate chemotherapy given location of her disease and concern for airway compromise.  - Echo done 11/1/24 - EF 66%; appreciate cardiology team    TREATMENT:  - Prednisone 100mg daily X5 days initiated 11/1/24  - Cytoxan/Adrimaycin 11/2/24  - plan to start growth factor support in a.m.  - upon confirmation of T-cell origin by pathology, will add Brentuximab Vedotin (anticipate on 11/4/24)    - to monitor tumor lysis labs Q12 hours and monitor airway  - allopurinol 300mg daily to prevent renal complications; monitor for rash  - will follow closely

## 2024-11-07 ENCOUNTER — TRANSCRIPTION ENCOUNTER (OUTPATIENT)
Age: 59
End: 2024-11-07

## 2024-11-07 VITALS
SYSTOLIC BLOOD PRESSURE: 135 MMHG | RESPIRATION RATE: 17 BRPM | TEMPERATURE: 99 F | DIASTOLIC BLOOD PRESSURE: 81 MMHG | OXYGEN SATURATION: 98 % | HEART RATE: 94 BPM

## 2024-11-07 LAB
ALBUMIN SERPL ELPH-MCNC: 3.4 G/DL — SIGNIFICANT CHANGE UP (ref 3.3–5)
ALP SERPL-CCNC: 114 U/L — SIGNIFICANT CHANGE UP (ref 40–120)
ALT FLD-CCNC: 17 U/L — SIGNIFICANT CHANGE UP (ref 4–33)
ANION GAP SERPL CALC-SCNC: 14 MMOL/L — SIGNIFICANT CHANGE UP (ref 7–14)
AST SERPL-CCNC: 11 U/L — SIGNIFICANT CHANGE UP (ref 4–32)
BASOPHILS # BLD AUTO: 0.07 K/UL — SIGNIFICANT CHANGE UP (ref 0–0.2)
BASOPHILS NFR BLD AUTO: 0.9 % — SIGNIFICANT CHANGE UP (ref 0–2)
BILIRUB SERPL-MCNC: 0.4 MG/DL — SIGNIFICANT CHANGE UP (ref 0.2–1.2)
BUN SERPL-MCNC: 7 MG/DL — SIGNIFICANT CHANGE UP (ref 7–23)
CALCIUM SERPL-MCNC: 8.9 MG/DL — SIGNIFICANT CHANGE UP (ref 8.4–10.5)
CHLORIDE SERPL-SCNC: 101 MMOL/L — SIGNIFICANT CHANGE UP (ref 98–107)
CO2 SERPL-SCNC: 27 MMOL/L — SIGNIFICANT CHANGE UP (ref 22–31)
CREAT SERPL-MCNC: 0.44 MG/DL — LOW (ref 0.5–1.3)
EGFR: 113 ML/MIN/1.73M2 — SIGNIFICANT CHANGE UP
EOSINOPHIL # BLD AUTO: 0.09 K/UL — SIGNIFICANT CHANGE UP (ref 0–0.5)
EOSINOPHIL NFR BLD AUTO: 1.1 % — SIGNIFICANT CHANGE UP (ref 0–6)
G6PD RBC-CCNC: 22.3 U/G HGB — HIGH (ref 7–20.5)
GLUCOSE BLDC GLUCOMTR-MCNC: 114 MG/DL — HIGH (ref 70–99)
GLUCOSE BLDC GLUCOMTR-MCNC: 118 MG/DL — HIGH (ref 70–99)
GLUCOSE BLDC GLUCOMTR-MCNC: 162 MG/DL — HIGH (ref 70–99)
GLUCOSE SERPL-MCNC: 114 MG/DL — HIGH (ref 70–99)
HCT VFR BLD CALC: 35.8 % — SIGNIFICANT CHANGE UP (ref 34.5–45)
HGB BLD-MCNC: 12.2 G/DL — SIGNIFICANT CHANGE UP (ref 11.5–15.5)
IANC: 6.08 K/UL — SIGNIFICANT CHANGE UP (ref 1.8–7.4)
IMM GRANULOCYTES NFR BLD AUTO: 14.3 % — HIGH (ref 0–0.9)
LDH SERPL L TO P-CCNC: 305 U/L — HIGH (ref 135–225)
LYMPHOCYTES # BLD AUTO: 0.58 K/UL — LOW (ref 1–3.3)
LYMPHOCYTES # BLD AUTO: 7.2 % — LOW (ref 13–44)
MAGNESIUM SERPL-MCNC: 1.9 MG/DL — SIGNIFICANT CHANGE UP (ref 1.6–2.6)
MCHC RBC-ENTMCNC: 25.6 PG — LOW (ref 27–34)
MCHC RBC-ENTMCNC: 34.1 G/DL — SIGNIFICANT CHANGE UP (ref 32–36)
MCV RBC AUTO: 75.1 FL — LOW (ref 80–100)
MONOCYTES # BLD AUTO: 0.12 K/UL — SIGNIFICANT CHANGE UP (ref 0–0.9)
MONOCYTES NFR BLD AUTO: 1.5 % — LOW (ref 2–14)
NEUTROPHILS # BLD AUTO: 6.08 K/UL — SIGNIFICANT CHANGE UP (ref 1.8–7.4)
NEUTROPHILS NFR BLD AUTO: 75 % — SIGNIFICANT CHANGE UP (ref 43–77)
NRBC # BLD: 0 /100 WBCS — SIGNIFICANT CHANGE UP (ref 0–0)
NRBC # FLD: 0 K/UL — SIGNIFICANT CHANGE UP (ref 0–0)
PHOSPHATE SERPL-MCNC: 2.6 MG/DL — SIGNIFICANT CHANGE UP (ref 2.5–4.5)
PLATELET # BLD AUTO: 193 K/UL — SIGNIFICANT CHANGE UP (ref 150–400)
POTASSIUM SERPL-MCNC: 3 MMOL/L — LOW (ref 3.5–5.3)
POTASSIUM SERPL-SCNC: 3 MMOL/L — LOW (ref 3.5–5.3)
PROT SERPL-MCNC: 6.5 G/DL — SIGNIFICANT CHANGE UP (ref 6–8.3)
RBC # BLD: 4.77 M/UL — SIGNIFICANT CHANGE UP (ref 3.8–5.2)
RBC # FLD: 13.5 % — SIGNIFICANT CHANGE UP (ref 10.3–14.5)
SODIUM SERPL-SCNC: 142 MMOL/L — SIGNIFICANT CHANGE UP (ref 135–145)
URATE SERPL-MCNC: 2.6 MG/DL — SIGNIFICANT CHANGE UP (ref 2.5–7)
WBC # BLD: 8.1 K/UL — SIGNIFICANT CHANGE UP (ref 3.8–10.5)
WBC # FLD AUTO: 8.1 K/UL — SIGNIFICANT CHANGE UP (ref 3.8–10.5)

## 2024-11-07 PROCEDURE — 99239 HOSP IP/OBS DSCHRG MGMT >30: CPT

## 2024-11-07 PROCEDURE — 99232 SBSQ HOSP IP/OBS MODERATE 35: CPT

## 2024-11-07 RX ORDER — ALLOPURINOL 100 MG
1 TABLET ORAL
Qty: 0 | Refills: 0 | DISCHARGE
Start: 2024-11-07

## 2024-11-07 RX ORDER — METFORMIN HYDROCHLORIDE 500 MG/1
1 TABLET, EXTENDED RELEASE ORAL
Qty: 40 | Refills: 0
Start: 2024-11-07 | End: 2024-11-26

## 2024-11-07 RX ORDER — POTASSIUM CHLORIDE 10 MEQ
40 TABLET, EXTENDED RELEASE ORAL ONCE
Refills: 0 | Status: COMPLETED | OUTPATIENT
Start: 2024-11-07 | End: 2024-11-07

## 2024-11-07 RX ORDER — INSULIN GLARGINE,HUM.REC.ANLOG 100/ML
14 VIAL (ML) SUBCUTANEOUS AT BEDTIME
Refills: 0 | Status: DISCONTINUED | OUTPATIENT
Start: 2024-11-07 | End: 2024-11-07

## 2024-11-07 RX ORDER — POTASSIUM CHLORIDE 10 MEQ
40 TABLET, EXTENDED RELEASE ORAL ONCE
Refills: 0 | Status: DISCONTINUED | OUTPATIENT
Start: 2024-11-07 | End: 2024-11-07

## 2024-11-07 RX ORDER — BENZOCAINE .06; .7 ML/1; ML/1
0 SWAB TOPICAL
Qty: 100 | Refills: 1
Start: 2024-11-07

## 2024-11-07 RX ORDER — METFORMIN HYDROCHLORIDE 500 MG/1
1 TABLET, EXTENDED RELEASE ORAL
Qty: 14 | Refills: 0
Start: 2024-11-07 | End: 2024-11-13

## 2024-11-07 RX ORDER — METFORMIN HYDROCHLORIDE 500 MG/1
1 TABLET, EXTENDED RELEASE ORAL
Qty: 80 | Refills: 0
Start: 2024-11-07 | End: 2024-12-16

## 2024-11-07 RX ORDER — ALLOPURINOL 100 MG
1 TABLET ORAL
Qty: 20 | Refills: 0
Start: 2024-11-07 | End: 2024-11-26

## 2024-11-07 RX ADMIN — Medication 10 MILLIGRAM(S): at 15:04

## 2024-11-07 RX ADMIN — Medication 8 UNIT(S): at 08:52

## 2024-11-07 RX ADMIN — Medication 2: at 17:40

## 2024-11-07 RX ADMIN — Medication 5 MILLIGRAM(S): at 07:03

## 2024-11-07 RX ADMIN — Medication 8 UNIT(S): at 17:39

## 2024-11-07 RX ADMIN — Medication 40 MILLIEQUIVALENT(S): at 12:57

## 2024-11-07 RX ADMIN — PANTOPRAZOLE SODIUM 40 MILLIGRAM(S): 40 TABLET, DELAYED RELEASE ORAL at 07:03

## 2024-11-07 NOTE — PROGRESS NOTE ADULT - PROVIDER SPECIALTY LIST ADULT
Heme/Onc
Endocrinology
Heme/Onc
Heme/Onc
Endocrinology
Endocrinology
Hospitalist
Endocrinology
Hospitalist
Pt declined/done

## 2024-11-07 NOTE — PROGRESS NOTE ADULT - PROBLEM SELECTOR PLAN 3
Hba1c 11.9  - c/w lantus 20 units and admelog 11 units w/ meals  - endocrine following
Hba1c 11.9  - c/w lantus 20 units and admelog 11 units w/ meals-->8 for tomorrow  - endocrine following  - assessing for OP med coverage
Hba1c 11.9  will start basal bolus insulin  consult endocrine  diabetes education and nutrition consult  check TSH and Lipid panel
Hba1c 11.9  - c/w Lantus 16 units and Ademlog 8 units   - endocrine following  - assessing for OP med coverage, f/u PA
Hba1c 11.9  - c/w Lantus 16 units and Ademlog 8 units   - endocrine following  - assessing for OP med coverage

## 2024-11-07 NOTE — PROGRESS NOTE ADULT - SUBJECTIVE AND OBJECTIVE BOX
Patient is a 58y old  Female who presents with a chief complaint of initiation of Chemotherapy for T- Cell lymphoma (06 Nov 2024 20:20)    SUBJECTIVE / OVERNIGHT EVENTS:    no CP, SOB, f/c/n/v  still constipated but no pain  no new issues    MEDICATIONS  (STANDING):  allopurinol 300 milliGRAM(s) Oral daily  amLODIPine   Tablet 5 milliGRAM(s) Oral with breakfast  chlorhexidine 2% Cloths 1 Application(s) Topical daily  enoxaparin Injectable 40 milliGRAM(s) SubCutaneous every 24 hours  glucagon  Injectable 1 milliGRAM(s) IntraMuscular once  insulin glargine Injectable (LANTUS) 16 Unit(s) SubCutaneous at bedtime  insulin lispro (ADMELOG) corrective regimen sliding scale   SubCutaneous three times a day before meals  insulin lispro (ADMELOG) corrective regimen sliding scale   SubCutaneous at bedtime  insulin lispro Injectable (ADMELOG) 8 Unit(s) SubCutaneous three times a day before meals  pantoprazole    Tablet 40 milliGRAM(s) Oral before breakfast  pegfilgrastim-jmdb (FULPHILA) Injectable 6 milliGRAM(s) SubCutaneous once  polyethylene glycol 3350 17 Gram(s) Oral daily  senna 2 Tablet(s) Oral at bedtime  sodium chloride 0.9%. 1000 milliLiter(s) (100 mL/Hr) IV Continuous <Continuous>    MEDICATIONS  (PRN):  acetaminophen     Tablet .. 650 milliGRAM(s) Oral every 6 hours PRN Temp greater or equal to 38C (100.4F), Mild Pain (1 - 3)  acetaminophen     Tablet .. 650 milliGRAM(s) Oral every 6 hours PRN chemotherapy reaction  acetaminophen     Tablet .. 650 milliGRAM(s) Oral every 6 hours PRN PRN REACTION  aluminum hydroxide/magnesium hydroxide/simethicone Suspension 30 milliLiter(s) Oral every 4 hours PRN Dyspepsia  bisacodyl Suppository 10 milliGRAM(s) Rectal daily PRN Constipation  dexAMETHasone  Injectable 4 milliGRAM(s) IV Push once PRN chemotherapy reaction  dextrose Oral Gel 15 Gram(s) Oral once PRN Blood Glucose LESS THAN 70 milliGRAM(s)/deciliter  diphenhydrAMINE Injectable 50 milliGRAM(s) IV Push once PRN REACTION  diphenhydrAMINE Injectable 50 milliGRAM(s) IV Push once PRN chemotherapy reaction  melatonin 3 milliGRAM(s) Oral at bedtime PRN Insomnia  ondansetron Injectable 4 milliGRAM(s) IV Push every 8 hours PRN Nausea and/or Vomiting    T(C): 37.3 (11-07-24 @ 12:31), Max: 37.3 (11-07-24 @ 12:31)  HR: 94 (11-07-24 @ 12:31) (76 - 94)  BP: 135/81 (11-07-24 @ 12:31) (135/81 - 149/79)  RR: 17 (11-07-24 @ 12:31) (17 - 18)  SpO2: 98% (11-07-24 @ 12:31) (97% - 99%)    CAPILLARY BLOOD GLUCOSE  POCT Blood Glucose.: 118 mg/dL (07 Nov 2024 12:48)  POCT Blood Glucose.: 114 mg/dL (07 Nov 2024 08:35)  POCT Blood Glucose.: 123 mg/dL (06 Nov 2024 22:24)  POCT Blood Glucose.: 181 mg/dL (06 Nov 2024 18:04)    PHYSICAL EXAM:  GENERAL: NAD   NECK: L neck swelling   CHEST/LUNG: Clear to auscultation bilaterally; No wheeze  HEART: Regular rate and rhythm; No murmurs, rubs, or gallops  ABDOMEN: Soft, Nontender, Nondistended; Bowel sounds present  EXTREMITIES:   warm and well perfused, No clubbing, cyanosis, or edema  NEUROLOGY: non-focal    LABS:                        12.2   8.10  )-----------( 193      ( 07 Nov 2024 06:45 )             35.8     11-07    142  |  101  |  7   ----------------------------<  114[H]  3.0[L]   |  27  |  0.44[L]    Ca    8.9      07 Nov 2024 06:45  Phos  2.6     11-07  Mg     1.90     11-07    TPro  6.5  /  Alb  3.4  /  TBili  0.4  /  DBili  x   /  AST  11  /  ALT  17  /  AlkPhos  114  11-07    PT/INR - ( 06 Nov 2024 06:55 )   PT: 12.1 sec;   INR: 1.04 ratio    PTT - ( 06 Nov 2024 06:55 )  PTT:31.8 sec    Consultant(s) Notes Reviewed:    Care Discussed with Consultants/Other Providers:

## 2024-11-07 NOTE — DISCHARGE NOTE NURSING/CASE MANAGEMENT/SOCIAL WORK - PATIENT PORTAL LINK FT
You can access the FollowMyHealth Patient Portal offered by [facility] by registering at the following website: [email or web address]. By joining [employer]’s FollowMyHealth portal, you will also be able to view your health information using other applications (apps) compatible with our system.

## 2024-11-07 NOTE — PROGRESS NOTE ADULT - PROBLEM SELECTOR PLAN 2
- c/w amlodipine 5mg daily
- c/w amlodipine 5mg daily
Chronic moderate exacerbation   /86  Continue amlodipine 5mg daily   Monitor
- c/w amlodipine 5mg daily
Chronic moderate exacerbation   /86  Continue amlodipine 5mg daily   Monitor
- c/w amlodipine 5mg daily

## 2024-11-07 NOTE — PROGRESS NOTE ADULT - ASSESSMENT
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 No previous h/o diabetes but baseline A1c 11.9 c/w undiagnosed diabetes, assumed to be Type 2. BS above target on current regimen. Prefer to target BS gen'l < 180, but no indication for "tight"  control.     #Type 2 Diabetes Mellitus  - HbA1c 11.9  ; home regimen: none  -follows up with PCP  -egfr: 108    Plan:   -FS goal 100-180: S/p Dexamethasone 4mg ivp x 1 pre chemotherapy today. Last dose of pred 100mg was yesterday 11/5 which is equivalent to dexamethasone 15mg.  - Continue Admelog  8 units premeal TID AC (please hold if npo/not eating)  - Continue moderate Admelog correction scale TIDQAC as and separate moderate scale QHS ; once steroid out of pts system usually around 72 hours then can change to low scale   - Please check FSG before meals and QHS, or q6h while NPO  - Please keep patient on a diabetic, carb controlled diet   - RD consult: completed   - hypoglycemia orderset prn  - extensively discussed importance of glycemic control to prevent micro- and macrovascular complications  - discussed importance of weight loss, exercise, and changing diet   - reviewed symptoms and management of hypoglycemia  - reviewed basics of insulin administration and pharmacokinetics, glucometer monitoring, as well as glycemic goals for outpatient setting  **if steroid plan changes and pt going to be on more steroids please notify endocrine as this will **  - Transitions of care pharmacist following---> please refer to pharmacotherapy note     - Discharge planning:   Please discharge pt on Trulicity 0.75mg sq weekly (if tolerating on 5th week should be increased to 1.5mg sq weekly this should be done by an outside provider)+ metformin 500mg BID x 1 week and increase to 1000mg BID if tolerating after the 1 week.     Needs GLP1 teaching prior to dc     Ensure patient has working glucometer, test strips, lancets, alcohol pads, and BD yaneth pen needles    For severe hypoglycemia: Please prescribe Glucose tablets 4G (take 4 tablets) or 15G tablets for blood sugar less than 70 mG/dL repeat fingerstick in 15 minutes.     As per primary team unclear of exact chemotherapy plan and premedication with steroids (next appointment with Oncologist in 1-2 weeks. If pt needs steroids prior to chemotherapy DM regimen may needs to be adjusted for the days on steroid;  this should be determined by an outside provider based on glucose trends       Please call your doctor if you fs is 70 or below and or 250 and above   Reviewed importance of medication adherence,  glucose monitoring, and following a consistent carb diet   Hypoglycemia and intervention reviewed  Please follow up with your pcp, podiatry, endocrinology, and opthalmology as an outpt     Pt with Medicaid insurance coverage. Pt can follow up at [facility] [place] [address] [place]  [address]: emailed office for an appointment on 11/7     #Hypertension  - BP goal <130/80  - Please obtain urine micro albumin cr ratio as an outpt   - on norvasc 5mg daily   - Management as per primary team    #Hyperlipidemia  - LDL goal <70  - LDL 58  -management as per primary team    D/w Dhruv Watts  Nurse Practitioner  Division of Endocrinology & Diabetes  In house pager #[phone]    If before 9AM or after 6PM, or on weekends/holidays, please call endocrine answering service for assistance ([phone]).For nonurgent matters email [email or web address] for assistance.

## 2024-11-07 NOTE — PROGRESS NOTE ADULT - PROBLEM SELECTOR PROBLEM 1
Diabetes mellitus
Diabetes mellitus
Tonsillar mass
Diabetes mellitus
Diabetes mellitus
Tonsillar mass
.

## 2024-11-07 NOTE — PROGRESS NOTE ADULT - SUBJECTIVE AND OBJECTIVE BOX
Chief Complaint: Type 2 DM     History: Pt seen at bedside. Pt tolerating oral diet. Pt denies nausea and vomiting/any signs of hypoglycemia. Pt reports an adequate appetite.     MEDICATIONS  (STANDING):  allopurinol 300 milliGRAM(s) Oral daily  amLODIPine   Tablet 5 milliGRAM(s) Oral with breakfast  chlorhexidine 2% Cloths 1 Application(s) Topical daily  dextrose 50% Injectable 25 Gram(s) IV Push once  dextrose 50% Injectable 12.5 Gram(s) IV Push once  dextrose 50% Injectable 25 Gram(s) IV Push once  enoxaparin Injectable 40 milliGRAM(s) SubCutaneous every 24 hours  glucagon  Injectable 1 milliGRAM(s) IntraMuscular once  insulin glargine Injectable (LANTUS) 16 Unit(s) SubCutaneous at bedtime  insulin lispro (ADMELOG) corrective regimen sliding scale   SubCutaneous at bedtime  insulin lispro (ADMELOG) corrective regimen sliding scale   SubCutaneous three times a day before meals  insulin lispro Injectable (ADMELOG) 8 Unit(s) SubCutaneous three times a day before meals  pantoprazole    Tablet 40 milliGRAM(s) Oral before breakfast  pegfilgrastim-jmdb (FULPHILA) Injectable 6 milliGRAM(s) SubCutaneous once  polyethylene glycol 3350 17 Gram(s) Oral daily  senna 2 Tablet(s) Oral at bedtime  sodium chloride 0.9%. 1000 milliLiter(s) (100 mL/Hr) IV Continuous <Continuous>    MEDICATIONS  (PRN):  acetaminophen     Tablet .. 650 milliGRAM(s) Oral every 6 hours PRN Temp greater or equal to 38C (100.4F), Mild Pain (1 - 3)  acetaminophen     Tablet .. 650 milliGRAM(s) Oral every 6 hours PRN chemotherapy reaction  acetaminophen     Tablet .. 650 milliGRAM(s) Oral every 6 hours PRN PRN REACTION  aluminum hydroxide/magnesium hydroxide/simethicone Suspension 30 milliLiter(s) Oral every 4 hours PRN Dyspepsia  bisacodyl Suppository 10 milliGRAM(s) Rectal daily PRN Constipation  dexAMETHasone  Injectable 4 milliGRAM(s) IV Push once PRN chemotherapy reaction  dextrose Oral Gel 15 Gram(s) Oral once PRN Blood Glucose LESS THAN 70 milliGRAM(s)/deciliter  diphenhydrAMINE Injectable 50 milliGRAM(s) IV Push once PRN REACTION  diphenhydrAMINE Injectable 50 milliGRAM(s) IV Push once PRN chemotherapy reaction  melatonin 3 milliGRAM(s) Oral at bedtime PRN Insomnia  ondansetron Injectable 4 milliGRAM(s) IV Push every 8 hours PRN Nausea and/or Vomiting      Allergies: No Known Allergies      Review of Systems:  Respiratory: No SOB, no cough  GI: No nausea, vomiting, abdominal pain  Endocrine: no polyuria, polydipsia      PHYSICAL EXAM:  VITALS: T(C): 37.3 (11-07-24 @ 12:31)  T(F): 99.2 (11-07-24 @ 12:31), Max: 99.2 (11-07-24 @ 12:31)  HR: 94 (11-07-24 @ 12:31) (76 - 94)  BP: 135/81 (11-07-24 @ 12:31) (135/81 - 149/79)  RR:  (17 - 18)  SpO2:  (97% - 99%)  Wt(kg): --  GENERAL: NAD, well-groomed, well-developed  RESPIRATORY: No labored breathing   GI: Soft, nontender, non distended  PSYCH: Alert and oriented x 3, normal affect, normal mood      CAPILLARY BLOOD GLUCOSE  POCT Blood Glucose.: 118 mg/dL (07 Nov 2024 12:48)  POCT Blood Glucose.: 114 mg/dL (07 Nov 2024 08:35)  POCT Blood Glucose.: 123 mg/dL (06 Nov 2024 22:24)  POCT Blood Glucose.: 181 mg/dL (06 Nov 2024 18:04)    A1C with Estimated Average Glucose in AM (11.02.24 @ 16:47)    A1C with Estimated Average Glucose Result: 11.9   Estimated Average Glucose: 295      11-07    142  |  101  |  7   ----------------------------<  114[H]  3.0[L]   |  27  |  0.44[L]    eGFR: 113    Ca    8.9      11-07  Mg     1.90     11-07  Phos  2.6     11-07    TPro  6.5  /  Alb  3.4  /  TBili  0.4  /  DBili  x   /  AST  11  /  ALT  17  /  AlkPhos  114  11-07    Diet, Regular:   Consistent Carbohydrate Evening Snack (CSTCHOSN)  DASH/TLC Sodium & Cholesterol Restricted (DASH)  Soft and Bite Sized (SOFTBTSZ)  Supplement Feeding Modality:  Oral  Ensure Max Cans or Servings Per Day:  1       Frequency:  Daily (11-04-24 @ 17:01) [Active]

## 2024-11-07 NOTE — PROGRESS NOTE ADULT - REASON FOR ADMISSION
initiation of Chemotherapy for possible T- Cell lymphoma

## 2024-11-07 NOTE — PHARMACOTHERAPY INTERVENTION NOTE - COMMENTS
Contacted Dr. Kera Bustamante to clarify infusion instructions for Brentuximab Vedotin.  On the order "50/ml first hour, if no infusion reaction, may increase by 50ml/hr to a max rate of 200ml/hr" does not belong on this order; Infusion instructions should be 30 minutes; Pegfilgrastrim is written on the order as IM, but the correct route for administration is SQ.  Provider corrected both issues. 
Pt educated on A1c, goal A1c, Trulicity pen administration, metformin dose and titration, hypoglycemia and treatment, healthy plate, goal blood glucose, basics of using a glucometer, and when to check BG, pt with good return demo (but asked for more practice with RN) and verbalized understanding. Patient would benefit from glucometer and review of Trulicity with her RN - informed RN. Pt encouraged for outpt f/u with medicine and endocrine - pending [facility] [place] appointments.

## 2024-11-07 NOTE — PROGRESS NOTE ADULT - PROBLEM SELECTOR PLAN 4
Lovenox ppx  form for transportation completed  possible dc on 11/7
Lovenox ppx  form for transportation completed
Lovenox ppx  form for transportation completed  dc 11/7 if cleared by onc and endo
Lovenox ppx  form for transportation completed

## 2024-11-07 NOTE — DISCHARGE NOTE NURSING/CASE MANAGEMENT/SOCIAL WORK - FINANCIAL ASSISTANCE
[facility] provides services at a reduced cost to those who are determined to be eligible through [facility]’s financial assistance program. Information regarding [facility]’s financial assistance program can be found by going to [email or web address] or by calling [phone].

## 2024-11-07 NOTE — PROGRESS NOTE ADULT - PROBLEM SELECTOR PLAN 1
Awaiting pathology results  [facility] consulted: Prednisone 100mg daily, allopurinol 300mg daily, TLS labs Q8, TTE.   S/p chemotherapy 11/2/24  continue allopurinol  monitor cr, today 0.46  per h.o, plan as follows:   Prednisone 100mg daily X5 days initiated 11/1/24  - completed Cytoxan/Adrimaycin 11/2/24 without incident  - plan to start growth factor support filgastrim at 480mcg daily for next 5 days.  - upon confirmation of T-cell origin by pathology, will add Brentuximab Vedotin (anticipate on 11/4/24)
Awaiting pathology results  [facility] consulted: Prednisone 100mg daily, allopurinol 300mg daily, TLS labs Q8, TTE.   Plan to initiate chemotherapy 11/2/24  continue allopurinol  monitor cr
mature T cell lymphoma  - s/p bx by ENT  - plan per heme: Prednisone 100mg daily x 5d 11/1-11/5/24 followed by Cytoxan/Adrimaycin 11/2/24, filgrastim at 480mcg daily for 2 days (11/3/24 - 11/4/24), adding Brentuximab-Vedotin 11/6  - pegfilgrastim on 11/7/24  - on allopurinol 300 mg  - f/u onc recs
- s/p bx by ENT, awaiting pathology results  - plan per heme: Prednisone 100mg daily x 5d 11/1-11/5/24 followed by Cytoxan/Adrimaycin 11/2/24, filgrastim at 480mcg daily for 2 days (11/3/24 - 11/4/24)  - upon confirmation of T-cell origin by pathology, will add other treatment, pending onc recs   - onc following
mature T cell lymphoma  - s/p bx by ENT  - plan per heme: Prednisone 100mg daily x 5d 11/1-11/5/24 followed by Cytoxan/Adrimaycin 11/2/24, filgrastim at 480mcg daily for 2 days (11/3/24 - 11/4/24), adding Brentuximab-Vedotin 11/6  - pegfilgrastim on 11/7/24  - on allopurinol 300 mg  - onc verbally recommended obtaining LP, now stating not needed   - onc following
- s/p bx by ENT, awaiting pathology results  - plan per heme: Prednisone 100mg daily x 5d 11/1-11/5/24 followed by Cytoxan/Adrimaycin 11/2/24, filgrastim at 480mcg daily for 5 days (11/3/24 - 11/7/24)  - upon confirmation of T-cell origin by pathology, will add Brentuximab Vedotin   - onc following

## 2024-11-08 ENCOUNTER — NON-APPOINTMENT (OUTPATIENT)
Age: 59
End: 2024-11-08

## 2024-11-08 PROBLEM — I10 ESSENTIAL (PRIMARY) HYPERTENSION: Chronic | Status: ACTIVE | Noted: 2024-11-01

## 2024-11-08 PROBLEM — E11.9 DIABETES MELLITUS TYPE 2 IN NONOBESE: Status: ACTIVE | Noted: 2024-11-08

## 2024-11-08 PROBLEM — I10 BENIGN ESSENTIAL HYPERTENSION: Status: ACTIVE | Noted: 2024-11-08

## 2024-11-08 PROBLEM — J35.8 TONSILLAR MASS: Status: ACTIVE | Noted: 2024-11-08

## 2024-11-12 ENCOUNTER — NON-APPOINTMENT (OUTPATIENT)
Age: 59
End: 2024-11-12

## 2024-11-12 ENCOUNTER — APPOINTMENT (OUTPATIENT)
Dept: HEMATOLOGY ONCOLOGY | Facility: CLINIC | Age: 59
End: 2024-11-12
Payer: MEDICAID

## 2024-11-12 ENCOUNTER — RESULT REVIEW (OUTPATIENT)
Age: 59
End: 2024-11-12

## 2024-11-12 VITALS
HEIGHT: 64.41 IN | HEART RATE: 139 BPM | SYSTOLIC BLOOD PRESSURE: 139 MMHG | DIASTOLIC BLOOD PRESSURE: 84 MMHG | RESPIRATION RATE: 16 BRPM | TEMPERATURE: 97.9 F | OXYGEN SATURATION: 98 % | BODY MASS INDEX: 36.51 KG/M2 | WEIGHT: 216.49 LBS

## 2024-11-12 DIAGNOSIS — R71.8 OTHER ABNORMALITY OF RED BLOOD CELLS: ICD-10-CM

## 2024-11-12 DIAGNOSIS — Z82.49 FAMILY HISTORY OF ISCHEMIC HEART DISEASE AND OTHER DISEASES OF THE CIRCULATORY SYSTEM: ICD-10-CM

## 2024-11-12 DIAGNOSIS — Z78.9 OTHER SPECIFIED HEALTH STATUS: ICD-10-CM

## 2024-11-12 DIAGNOSIS — J35.8 OTHER CHRONIC DISEASES OF TONSILS AND ADENOIDS: ICD-10-CM

## 2024-11-12 PROBLEM — C85.90 T-CELL LYMPHOMA: Status: ACTIVE | Noted: 2024-11-12

## 2024-11-12 LAB
HTLV I + HTLV II ANTIBODY SCREEN: POSITIVE
Lab: POSITIVE
Lab: POSITIVE

## 2024-11-12 PROCEDURE — 99215 OFFICE O/P EST HI 40 MIN: CPT

## 2024-11-12 PROCEDURE — G2211 COMPLEX E/M VISIT ADD ON: CPT | Mod: NC

## 2024-11-13 ENCOUNTER — NON-APPOINTMENT (OUTPATIENT)
Age: 59
End: 2024-11-13

## 2024-11-13 LAB
ALBUMIN SERPL ELPH-MCNC: 3.7 G/DL
ALP BLD-CCNC: 87 U/L
ALT SERPL-CCNC: 13 U/L
ANION GAP SERPL CALC-SCNC: 20 MMOL/L
APTT BLD: 33.7 SEC
AST SERPL-CCNC: 17 U/L
BILIRUB SERPL-MCNC: 0.4 MG/DL
BUN SERPL-MCNC: 10 MG/DL
CALCIUM SERPL-MCNC: 10 MG/DL
CHLORIDE SERPL-SCNC: 95 MMOL/L
CO2 SERPL-SCNC: 24 MMOL/L
CREAT SERPL-MCNC: 0.98 MG/DL
DNA PLOIDY SPEC FC-IMP: SIGNIFICANT CHANGE UP
EGFR: 67 ML/MIN/1.73M2
FERRITIN SERPL-MCNC: 2587 NG/ML
FOLATE SERPL-MCNC: 7.1 NG/ML
GLUCOSE SERPL-MCNC: 241 MG/DL
HBV CORE IGG+IGM SER QL: NONREACTIVE
INR PPP: 1.03 RATIO
IRON SATN MFR SERPL: 30 %
IRON SERPL-MCNC: 54 UG/DL
LDH SERPL-CCNC: 289 U/L
MAGNESIUM SERPL-MCNC: 1.6 MG/DL
PHOSPHATE SERPL-MCNC: 2.1 MG/DL
POTASSIUM SERPL-SCNC: 3.7 MMOL/L
PROT SERPL-MCNC: 7.1 G/DL
PT BLD: 12.1 SEC
RBC # BLD: 4.77 M/UL
RETICS # AUTO: 0.5 %
RETICS AGGREG/RBC NFR: 23.8 K/UL
SODIUM SERPL-SCNC: 138 MMOL/L
TIBC SERPL-MCNC: 181 UG/DL
UIBC SERPL-MCNC: 127 UG/DL
URATE SERPL-MCNC: 3.4 MG/DL
VIT B12 SERPL-MCNC: >2000 PG/ML

## 2024-11-14 ENCOUNTER — NON-APPOINTMENT (OUTPATIENT)
Age: 59
End: 2024-11-14

## 2024-11-14 LAB
HGB A MFR BLD: 61 %
HGB A2 MFR BLD: 3.2 %
HGB FRACT BLD-IMP: NORMAL
HGB S BLD QL SOLY: POSITIVE
HGB S MFR BLD: 35.8 %

## 2024-11-15 ENCOUNTER — NON-APPOINTMENT (OUTPATIENT)
Age: 59
End: 2024-11-15

## 2024-11-19 ENCOUNTER — APPOINTMENT (OUTPATIENT)
Dept: INTERNAL MEDICINE | Facility: CLINIC | Age: 59
End: 2024-11-19

## 2024-11-19 VITALS
HEIGHT: 64.41 IN | WEIGHT: 216 LBS | HEART RATE: 142 BPM | OXYGEN SATURATION: 99 % | SYSTOLIC BLOOD PRESSURE: 141 MMHG | BODY MASS INDEX: 36.43 KG/M2 | DIASTOLIC BLOOD PRESSURE: 89 MMHG

## 2024-11-19 DIAGNOSIS — Z00.00 ENCOUNTER FOR GENERAL ADULT MEDICAL EXAMINATION W/OUT ABNORMAL FINDINGS: ICD-10-CM

## 2024-11-19 DIAGNOSIS — E11.9 TYPE 2 DIABETES MELLITUS W/OUT COMPLICATIONS: ICD-10-CM

## 2024-11-19 DIAGNOSIS — I10 ESSENTIAL (PRIMARY) HYPERTENSION: ICD-10-CM

## 2024-11-19 PROCEDURE — 99495 TRANSJ CARE MGMT MOD F2F 14D: CPT | Mod: GC

## 2024-11-20 LAB
HTLV I+II AB SER QL: POSITIVE
Lab: POSITIVE
Lab: POSITIVE

## 2024-11-20 RX ORDER — ALLOPURINOL 300 MG/1
300 TABLET ORAL DAILY
Qty: 30 | Refills: 3 | Status: ACTIVE | COMMUNITY
Start: 2024-11-19 | End: 1900-01-01

## 2024-11-20 RX ORDER — METFORMIN HYDROCHLORIDE 1000 MG/1
1000 TABLET, COATED ORAL TWICE DAILY
Qty: 180 | Refills: 2 | Status: ACTIVE | COMMUNITY
Start: 2024-11-19 | End: 1900-01-01

## 2024-11-20 RX ORDER — AMLODIPINE BESYLATE 5 MG/1
5 TABLET ORAL DAILY
Qty: 30 | Refills: 2 | Status: ACTIVE | COMMUNITY
Start: 2024-11-19 | End: 1900-01-01

## 2024-11-22 ENCOUNTER — APPOINTMENT (OUTPATIENT)
Dept: NUCLEAR MEDICINE | Facility: IMAGING CENTER | Age: 59
End: 2024-11-22

## 2024-11-22 ENCOUNTER — NON-APPOINTMENT (OUTPATIENT)
Age: 59
End: 2024-11-22

## 2024-11-22 RX ORDER — PEN NEEDLE, DIABETIC 29 G X1/2"
32G X 4 MM NEEDLE, DISPOSABLE MISCELLANEOUS
Qty: 1 | Refills: 1 | Status: ACTIVE | COMMUNITY
Start: 2024-11-22 | End: 1900-01-01

## 2024-11-22 RX ORDER — HUMAN INSULIN 100 [IU]/ML
100 INJECTION, SUSPENSION SUBCUTANEOUS
Qty: 1 | Refills: 0 | Status: ACTIVE | COMMUNITY
Start: 2024-11-22 | End: 1900-01-01

## 2024-11-25 RX ORDER — BLOOD SUGAR DIAGNOSTIC
STRIP MISCELLANEOUS 3 TIMES DAILY
Qty: 100 | Refills: 5 | Status: DISCONTINUED | COMMUNITY
Start: 2024-11-22 | End: 2024-11-25

## 2024-11-25 RX ORDER — LANCETS 33 GAUGE
EACH MISCELLANEOUS
Qty: 100 | Refills: 5 | Status: DISCONTINUED | COMMUNITY
Start: 2024-11-22 | End: 2024-11-25

## 2024-11-27 RX ORDER — DULAGLUTIDE 1.5 MG/.5ML
1.5 INJECTION, SOLUTION SUBCUTANEOUS
Qty: 4 | Refills: 2 | Status: ACTIVE | COMMUNITY
Start: 2024-11-19

## 2024-12-02 NOTE — ED ADULT NURSE REASSESSMENT NOTE - NS ED NURSE REASSESS COMMENT FT1
Mobile Critical Care RN: patient is 59/F PMHx of lymphoma, DM2, arriving to the ER with sepsis, found to be in DKA. patient remains in room 25 at this time, lethargic but oriented x3, denies pain and afebrile. maintained on 6LNC, sinus tachy to 130, normotensive MAP above 65. PIV x2. receiving 3rd L of IVF. NPO on insulin gtt no complaints of nausea/vomiting/diarrhea at this time. Primafit in place. Left neck mass noted with skin hyperpigmentation. MICU admitted for DKA and insulin gtt management. repeat labs drawn and sent.

## 2024-12-02 NOTE — H&P ADULT - ASSESSMENT
59y Female with DMT2 on trulicity/metformin, HTN on amlodipine, and lymphoma on chemo presents to ED with progressive SOB and weakness for 1 week and no PO intake in the last day.    NEURO:  #AMS  -Likely iso DKA  -AOx3  -Treat underlying DKA    PULM:  #Hypoxic respiratory failure  -Unclear atelectasis vs infection vs PE  -CXR clear  -Pending chest CTA  -On 5L NC on admission, wean as tolerated    CARDIOVASCULAR:  #HTN  -Monitor off home amlodipine iso sepsis    ENDOCRINE:  #DKA  #T2DM  -On admission, serum glucose 554. Anion gap 35. Blood lactate 4.1. UA positive for glucosuria and ketones.  -Start Insulin drip and fluids with K  -Monitor BMP and BHB q4h  -Obtain A1C  -Hold home trulicity and metformin while inpatient      HEME/ONC:  #T-cell lymphoma  -Monitor TLS labs daily  -Heme/onc consult tomorrow morning  -Monitor CBCs  -Follows with Dr. Womack for outpatient heme-onc; last chemo on 11/25 with Brentuximab/Doxorubicin/Cytoxan/onpro    #Pancytopenic - likely 2/2 chemo vs infection  -Hgb >7  -Plt >10  -Heme onc consult      GI/:  No active issues  #Diet  -NPO for AMS  -Obtain nutrition consult    RENAL:  No active issues    ID:  #Sepsis  #Neutropenic fever  -Obtain pan culture  -C/w cefepime    PROPHYLAXIS:  -Heparin for DVT prophylaxis    LINES:  -Peripheral IV    ETHICS:  Code:  59y Female with DMT2 on trulicity/metformin, HTN on amlodipine, and lymphoma on chemo presents to ED with progressive SOB and weakness for 1 week and no PO intake in the last day.    NEURO:  #AMS  -Likely iso DKA  -AOx3  -Treat underlying DKA    PULM:  #Hypoxic respiratory failure  -Unclear atelectasis vs infection vs PE  -CXR clear  -Pending chest CTA  -On 5L NC on admission, wean as tolerated    CARDIOVASCULAR:  #HTN  -Monitor off home amlodipine iso sepsis    ENDOCRINE:  #DKA  #T2DM  -On admission, serum glucose 554. Anion gap 35. Blood lactate 4.1. UA positive for glucosuria and ketones.  -Start Insulin drip and fluids with K  -Monitor BMP and BHB q4h  -Obtain A1C  -Hold home trulicity and metformin while inpatient      HEME/ONC:  #T-cell lymphoma  -Monitor TLS labs daily  -Heme/onc consult tomorrow morning  -Monitor CBCs  -Follows with Dr. Womack for outpatient heme-onc; last chemo on 11/25 with Brentuximab/Doxorubicin/Cytoxan/onpro    #Pancytopenic - likely 2/2 chemo vs infection  -Hgb >7  -Plt >10  -Heme onc consult      GI/:  No active issues  #Diet  -NPO for AMS  -Obtain nutrition consult    RENAL:  No active issues    ID:  #Sepsis  #Neutropenic fever  -Obtain pan culture  -C/w cefepime    PROPHYLAXIS:  -Heparin for DVT prophylaxis    LINES:  -Peripheral IV    ETHICS:  Code: Full 59y Female with DMT2 on trulicity/metformin, HTN on amlodipine, and lymphoma on chemo presents to ED with progressive SOB and weakness for 1 week and no PO intake in the last day.    NEURO:  #AMS  -Likely iso DKA  -AOx3  -Treat underlying DKA    PULM:  #Hypoxic respiratory failure  -Unclear atelectasis vs infection  -CXR and CTA clear with no evidence of PE  -On 5L NC on admission, wean as tolerated    CARDIOVASCULAR:  #HTN  -Monitor off home amlodipine iso sepsis    ENDOCRINE:  #DKA  #T2DM  -On admission, serum glucose 554. Anion gap 35. Blood lactate 4.1. UA positive for glucosuria and ketones.  -Start Insulin drip and fluids with K  -Monitor BMP and BHB q4h  -Obtain A1C  -Hold home trulicity and metformin while inpatient      HEME/ONC:  #T-cell lymphoma  -Monitor TLS labs daily  -Heme/onc consult tomorrow morning  -Monitor CBCs  -Follows with Dr. Womack for outpatient heme-onc; last chemo on 11/25 with Brentuximab/Doxorubicin/Cytoxan/onpro    #Pancytopenic - likely 2/2 chemo vs infection  -Hgb >7  -Plt >10  -Heme onc consult      GI/:  No active issues  #Diet  -NPO for AMS  -Obtain nutrition consult    RENAL:  No active issues    ID:  #Sepsis  #Neutropenic fever  -Obtain pan culture  -C/w cefepime    PROPHYLAXIS:  -Heparin for DVT prophylaxis    LINES:  -Peripheral IV    ETHICS:  Code: Full

## 2024-12-02 NOTE — H&P ADULT - NSHPSOCIALHISTORY_GEN_ALL_CORE
No tobacco or substance use. Minor social drinking history. Patient lives with son. Has daughter who is next of kin (Sisi Martinez [phone]).

## 2024-12-02 NOTE — ED PROVIDER NOTE - PROGRESS NOTE DETAILS
[place] PGY2: labs consistent with DKA.  BHB>9.0.  will start insulin drip.  MICU consulted.  CTa pending.

## 2024-12-02 NOTE — PATIENT PROFILE ADULT - HEALTH LITERACY
Patient Instructions by Harinder Mclaughlin OD at 05/05/17 03:38 PM     Author:  Harinder Mclaughlin OD Service:  (none) Author Type:  Optometrist     Filed:  05/05/17 03:43 PM Encounter Date:  5/5/2017 Status:  Addendum     :  Harinder Mclaughlin OD (Optometrist)            PLAN:    Use warm compress 2x/day and then clean the base of the eyelashes with Hypochlor  Schedule follow up for 2 weeks  Additional Educational Resources:  For additional resources regarding your symptoms, diagnosis, or further health information, please visit the Health Resources section on [email or web address] or the Online Health Resources section in [employer].        Revision History        User Key Date/Time User Provider Type Action    > [N/A] 05/05/17 03:43 PM Harinder Mclaughlin OD Optometrist Addend     [N/A] 05/05/17 03:41 PM Harinder Mclaughlin OD Optometrist Sign             no

## 2024-12-02 NOTE — ED ADULT NURSE NOTE - NSFALLRISKINTERV_ED_ALL_ED

## 2024-12-02 NOTE — PATIENT PROFILE ADULT - FALL HARM RISK - HARM RISK INTERVENTIONS
Problem: Pain  Goal: #Acceptable pain level achieved/maintained at rest using NRS/Faces  Description: This goal is used for patients who can self-report.  Acceptable means the level is at or below the identified comfort/function goal.  Outcome: Outcome Not Met, Continue to Monitor  Goal: # Acceptable pain level achieved/maintained at rest using NRS/Faces without oversedation (opioid naive or PCA/Epidural infusion)  Description: This goal is used if Opioid-naïve or on PCA/Epidural Infusion.  Outcome: Outcome Not Met, Continue to Monitor  Goal: # Acceptable pain level achieved/maintained with activity using NRS/Faces  Description: This goal is used for patients who can self-report and are not achieving acceptable pain control during activity.  Outcome: Outcome Not Met, Continue to Monitor  Goal: Acceptable pain/comfort level is achieved/maintained at rest (based on Pain Behaviors Scale)  Description: This goal is used for patients who are not able to self-report pain and are assessed for pain using the Pain Behaviors Scale  Outcome: Outcome Not Met, Continue to Monitor  Goal: Acceptable pain/comfort level is achieved/maintained at rest based on PAINAID scale (Dementia)  Description: This goal is used for patients who are not able to self-report pain, have dementia, and assessed using the PAINAD scale.  Outcome: Outcome Not Met, Continue to Monitor  Goal: Acceptable pain/comfort level is achieved/maintained at rest (based on pediatric behavior tool: NIPS, NPASS, or FLACC)  Description: This goal is used for pediatric patients who are not able to self report pain.  Outcome: Outcome Not Met, Continue to Monitor  Goal: # Verbalizes understanding of pain management  Description: Documented in Patient Education Activity  Outcome: Outcome Not Met, Continue to Monitor  Goal: Verbalizes understanding and effective use of Patient Controlled Analgesia (PCA)  Description: Documented in Patient Education Activity  This goal is  used for patients with PCA  Outcome: Outcome Not Met, Continue to Monitor  Goal: Maximum comfort achieved/maintained at end of life (Hospice)  Outcome: Outcome Not Met, Continue to Monitor      Assistance with ambulation/Assistance OOB with selected safe patient handling equipment/Communicate Risk of Fall with Harm to all staff/Monitor gait and stability/Reinforce activity limits and safety measures with patient and family/Sit up slowly, dangle for a short time, stand at bedside before walking/Tailored Fall Risk Interventions/Visual Cue: Yellow wristband and red socks/Bed in lowest position, wheels locked, appropriate side rails in place/Call bell, personal items and telephone in reach/Instruct patient to call for assistance before getting out of bed or chair/Non-slip footwear when patient is out of bed/[place] to call system/Physically safe environment - no spills, clutter or unnecessary equipment/Purposeful Proactive Rounding/Room/bathroom lighting operational, light cord in reach

## 2024-12-02 NOTE — ED PROVIDER NOTE - CLINICAL SUMMARY MEDICAL DECISION MAKING FREE TEXT BOX
59-year-old female, history of diabetes, lymphoma undergoing chemo, here for 2 days of CP and shortness of breath worse with exertion.  Patient arrives on nonrebreather, rate tachypneic, history limited secondary to patient condition.  Patient tachycardic to 131, satting 100% on nonrebreather, oral temp 99.9, speaking few word sentences, in moderate distress secondary to shortness of breath, heart tachycardic with regular rhythm, lungs clear, abdomen soft and nontender, no gross motor or sensory deficits.  Will assess for metabolic\infectious process, PE.  Plan for sepsis labs, chest x-ray, urinalysis, CTA chest with IV contrast.  Will give IV fluids, broad-spectrum antibiotics.

## 2024-12-02 NOTE — H&P ADULT - NSHPLABSRESULTS_GEN_ALL_CORE
7.3    0.12  )-----------( 73       ( 02 Dec 2024 14:50 )             21.4     12    135  |  93[L]  |  22  ----------------------------<  554[HH]  4.3   |  7[LL]  |  1.20    Ca    10.1      02 Dec 2024 14:50    TPro  7.3  /  Alb  3.0[L]  /  TBili  0.6  /  DBili  x   /  AST  8   /  ALT  20  /  AlkPhos  108  12    PT/INR - ( 02 Dec 2024 14:50 )   PT: 12.6 sec;   INR: 1.06 ratio         PTT - ( 02 Dec 2024 14:50 )  PTT:28.1 sec    Blood Gas Profile - Venous (24 @ 16:45)    pH, Venous: 7.10    pCO2, Venous: 26 mmHg    pO2, Venous: 56 mmHg    HCO3, Venous: 8: TYPE:(C=Critical, N=Notification, A=Abnormal) _    Urinalysis Basic - ( 02 Dec 2024 14:50 )    Color: Yellow / Appearance: Clear / S.021 / pH: x  Gluc: 554 mg/dL / Ketone: 80 mg/dL  / Bili: Negative / Urobili: 0.2 mg/dL   Blood: x / Protein: 30 mg/dL / Nitrite: Negative   Leuk Esterase: Negative / RBC: 2 /HPF / WBC 0 /HPF   Sq Epi: x / Non Sq Epi: 2 /HPF / Bacteria: Many /HPF    < from: Xray Chest 2 Views PA/Lat (24 @ 18:36) >      FINDINGS:  Redemonstrated elevated right hemidiaphragm  No focal consolidations  There is no pleural effusion or pneumothorax.  The heart is normal in size  The visualized osseous structures demonstrate no acute pathology.    IMPRESSION:  No focal consolidations.    --- End of Report ---    < end of copied text >

## 2024-12-02 NOTE — ED PROVIDER NOTE - ATTENDING CONTRIBUTION TO CARE
Dr. Liz, Attending Physician-  I performed a face to face bedside interview with patient regarding history of present illness, review of symptoms and past medical history. I completed an independent physical exam.  I have discussed patient's plan of care with the resident.    59-year-old F w/Hx of diabetes, lymphoma undergoing chemo, here for 2 days of CP and shortness of breath worse with exertion.  Patient arrives on nonrebreather, rate tachypneic, history limited secondary to patient condition.  Patient tachycardic to 131, satting 100% on NRB and limited oral Hx given CP and SOB. Oral temp 99.9, speaking few word sentences, in moderate distress secondary to shortness of breath, heart tachycardic with regular rhythm, lungs clear, abdomen soft and nontender, no gross motor or sensory deficits, no hoarseness/obvious voice changes, neck supple. Pt immunocompromised, sepsis w/u indicated.  Will assess for metabolic\infectious process, PE.  Plan for sepsis labs, chest x-ray, urinalysis, CTA chest with IV contrast.  Will give IV fluids, broad-spectrum antibiotics. Consider bipap if pt continues to have inc. WOB. Pt likely TBA for further eval pending workup. - Axel Liz MD. EM Attending

## 2024-12-02 NOTE — ED ADULT NURSE NOTE - OBJECTIVE STATEMENT
Pt presents to ED c/o SOB for two days. States she has been getting increasingly worse since it began. Denies any chest pain or abd pain. Endorses black stools and hx of lymphoma. Pt noted to be tachypneic, arrived on NRBM @15L/min. MD reduced 02 to 5L/min with 02 sat 100%. Pt continues to be mildly tachypneic. Labs drawn and sent. Fluids and abx initiated. Will continue to monitor. Pending imaging.

## 2024-12-02 NOTE — H&P ADULT - ATTENDING COMMENTS
58yo F with PMHx T-Cell lymphoma (on chemo and with large necrotic neck mass), HTN, DM p/w DKA and febrile neutropenia    1) Neuro  - Patient lethargic, but answering questions appropriately    2) CV  - PMHx of HTN; hold home meds    3) Respiratory  - Patient with tachycardia and increased work of breathing  - Tachypnea likely secondary to metabolic acidosis and attempt to compensate  - CT chest without any PE or pneumonia  - Currently without any stridor to suggest airway compromise from neck mass    4) GI  - NPO except for water given DKA  - Consistent carb diet once off insulin drip    5) Renal  - Baseline creatinine of roughly .5; currently with LOUISA; improving with IV fluids. Likely pre-renal in the setting of dehydration from fluid loss.  - Received 2L of NS in the ED; will continue with LR while here  - Potassium relatively low and hemolyzed; while continue with LR with potassium at 150/hr  - Initial sodium corrects to 145    6) ID  - Patient febrile and with neutrophil count < 500 (currently 10)  - Will cover with cefepime for now; no signs of skin or soft tissue infection to require MRSA coverage    7) Heme/Onc  - PMHx of lymphoma and currently on chemotherapy  - F/U TLS labs; low suspicion for tumor lysis at this time. F/U G6PD.  - Consult Heme in AM  - HSQ for dvt phx    8) Endocrine  - AG>25, beta hydroxy >9, acidemic all supportive of DKA  - Continue insulin drip for now with standard titration  - F/U A1C    8) Ethics  - Full Code

## 2024-12-02 NOTE — H&P ADULT - HISTORY OF PRESENT ILLNESS
59y Female with DMT2 on trulicity/metformin, HTN on amlodipine, and lymphoma on chemo presents to ED with progressive SOB and weakness for 1 week and no PO intake in the last day. Patient also endorses black stool. Patient was admitted at [facility] from 10/25-10/29/24 during which she was diagnosed with a tonsillar mass with lymphadenopathy, concerning for T-Cell lymphoma, and new HTN. She was readmitted to [facility] from 11/1-11/7/24 where she was initiated on chemotherapy and allopurinol. She was also found to have new DMT2 and was started on trulicity and metformin. Last chemo on 11/25 with Brentuximab/Doxorubicin/Cytoxan/onpro. Denies chest or abdominal pain, all other ROS negative.    In the ED, patient was tachycardic and tachypneic, arrived on NRBM @15L/min. Patient was leukopenic to 0.12, anemic to 7.3, thrombocytopenic to 73K. Serum glucose was 554. Anion gap elevated to 35. Blood lactate 4.1. UA positive for glucosuria and ketones. Fluids and abx initiated.

## 2024-12-02 NOTE — ED ADULT TRIAGE NOTE - CHIEF COMPLAINT QUOTE
pt living with DM type2, lymphoma on chemo, c/o of sob for 2 days, pt appears tachypneic, poor historian

## 2024-12-02 NOTE — H&P ADULT - NSHPPHYSICALEXAM_GEN_ALL_CORE
T(C): 36.2 (12-02-24 @ 19:20), Max: 37.7 (12-02-24 @ 13:26)  HR: 131 (12-02-24 @ 19:20) (131 - 144)  BP: 131/58 (12-02-24 @ 19:20) (110/69 - 131/58)  RR: 21 (12-02-24 @ 19:20) (20 - 28)  SpO2: 100% (12-02-24 @ 19:20) (95% - 100%)    CONSTITUTIONAL: AOx3 but lethargic  EYES: PERRLA and symmetric, EOMI, No conjunctival or scleral injection, non-icteric  NECK: Hyperpigmented, large left neck mass  RESP: Tachypneic, no use of accessory muscles; CTA b/l, no WRR  CV: RRR, +S1S2, no MRG; no JVD; no peripheral edema  GI: Soft, NT, ND, no rebound, no guarding; no palpable masses  LYMPH: No cervical LAD or tenderness; no axillary LAD or tenderness; no inguinal LAD or tenderness  MSK: Normal gait; Normal ROM without pain  SKIN: No rashes or ulcers noted; no subcutaneous nodules or induration palpable  PSYCH: Confused but A+O x 3

## 2024-12-02 NOTE — PATIENT PROFILE ADULT - FLU SEASON?
Verbally reviewed discharge instructions for care and follow up. Previous print out of these instructions were given with prior treatment. Patient verbalized understanding of these instructions. Today's copy offered and declined. Yes...

## 2024-12-03 NOTE — PROGRESS NOTE ADULT - ASSESSMENT
59y Female with DMT2 on trulicity/metformin, HTN on amlodipine, and lymphoma on chemo presents to ED with progressive SOB and weakness for 1 week and no PO intake in the last day.    NEURO:  #AMS  -Likely iso DKA  -AOx3  -Treat underlying DKA    PULM:  #Hypoxic respiratory failure  -Unclear atelectasis vs infection  -CXR and CTA clear with no evidence of PE  -On 5L NC on admission, wean as tolerated    CARDIOVASCULAR:  #HTN  -Monitor off home amlodipine iso sepsis    ENDOCRINE:  #DKA  #T2DM  -On admission, serum glucose 554. Anion gap 35. Blood lactate 4.1. UA positive for glucosuria and ketones.  -Start Insulin drip and fluids with K  -Monitor BMP and BHB q4h  -Obtain A1C  -Hold home trulicity and metformin while inpatient      HEME/ONC:  #T-cell lymphoma  -Monitor TLS labs daily  -Heme/onc consult tomorrow morning  -Monitor CBCs  -Follows with Dr. Womack for outpatient heme-onc; last chemo on 11/25 with Brentuximab/Doxorubicin/Cytoxan/onpro    #Pancytopenic - likely 2/2 chemo vs infection  -S/p 2 units pRBCs  -Hgb >7  -Plt >10  -Heme onc consult      GI/:  No active issues  #Diet  -NPO for AMS  -Obtain nutrition consult    RENAL:  No active issues    ID:  #Sepsis  #Neutropenic fever  -E. Coli bacteremia  -F/u pan culture  -C/w cefepime 2g q8h    PROPHYLAXIS:  -Heparin for DVT prophylaxis    LINES:  -L upper arm peripheral IV    ETHICS:  Code: Full 59y Female with DMT2 on trulicity/metformin, HTN on amlodipine, and lymphoma on chemo presents to ED with progressive SOB and weakness for 1 week and no PO intake in the last day.    NEURO:  #AMS  -Likely iso DKA  -AOx3  -Treat underlying DKA    PULM:  #Hypoxic respiratory failure  -Unclear atelectasis vs infection  -CXR and CTA clear with no evidence of PE  -On 5L NC on admission, wean as tolerated    CARDIOVASCULAR:  #HTN  -Monitor off home amlodipine iso sepsis    ENDOCRINE:  #DKA  #T2DM  -On admission, serum glucose 554. Anion gap 35. Blood lactate 4.1. UA positive for glucosuria and ketones.  -Start Insulin drip and fluids with K  -Monitor BMP and BHB q4h  -A1C 12,2,   -Hold home trulicity and metformin while inpatient  -Endocrinology consult appreciated      HEME/ONC:  #T-cell lymphoma  -Monitor TLS labs daily  -Heme/onc consult appreciated  -Monitor CBCs  -Follows with Dr. Womack for outpatient heme-onc; last chemo on 11/25 with Brentuximab/Doxorubicin/Cytoxan/onpro    #Pancytopenic - likely 2/2 chemo vs infection  -S/p 2 units pRBCs  -Hgb >7  -Plt >10  -Heme onc consult appreciated      GI/:  No active issues  #Diarrhea  -Maroon colored diarrhea noted today  -Obtain GI PCR panel +/- stool culture  -Fluid replacement prn    #Diet  -NPO for AMS  -Obtain nutrition consult    RENAL:  No active issues    ID:  #Sepsis  #Neutropenic fever  -E. Coli bacteremia, may be iso GI illness  -F/u pan culture  -C/w cefepime 2g q8h    PROPHYLAXIS:  -Hold heparin iso thrombocytopenia    LINES:  -L and R upper arm peripheral IV    ETHICS:  Code: Full 59y Female with DMT2 on trulicity/metformin, HTN on amlodipine, and lymphoma on chemo presents to ED with progressive SOB and weakness for 1 week and no PO intake in the last day.    NEURO:  #AMS  -Likely iso DKA  -AOx3  -Treat underlying DKA    PULM:  #Hypoxic respiratory failure  -Unclear atelectasis vs infection  -CXR and CTA clear with no evidence of PE  -On 5L NC on admission, wean as tolerated    CARDIOVASCULAR:  #HTN  #Dehydration - IVC 1.5  -Monitor off home amlodipine iso sepsis  -Fluid bolus    ENDOCRINE:  #DKA  #T2DM  -On admission, serum glucose 554. Anion gap 35. Blood lactate 4.1. UA positive for glucosuria and ketones.  -Start Insulin drip and fluids with K  -Monitor BMP and BHB q4h  -A1C 12,2,   -Hold home trulicity and metformin while inpatient  -Endocrinology consult appreciated      HEME/ONC:  #T-cell lymphoma  -Monitor TLS labs daily  -Heme/onc consult appreciated  -Monitor CBCs  -Follows with Dr. Womack for outpatient heme-onc; last chemo on 11/25 with Brentuximab/Doxorubicin/Cytoxan/onpro    #Pancytopenic - likely 2/2 chemo vs infection  -S/p 2 units pRBCs  -Hgb >7  -Plt >10  -Heme onc consult appreciated      GI/:  No active issues  #Diarrhea  -Maroon colored diarrhea noted today  -Obtain GI PCR panel +/- stool culture  -Fluid replacement prn    #Diet  -NPO for AMS  -Obtain nutrition consult    RENAL:  #Hypokalemia, hypomagnesemia  -Replete electrolytes  -Monitor BMP q4h    ID:  #Sepsis  #Neutropenic fever  -E. Coli bacteremia, may be iso GI illness  -F/u pan culture  -C/w cefepime 2g q8h    PROPHYLAXIS:  -Hold heparin iso thrombocytopenia    LINES:  -L and R upper arm peripheral IV    ETHICS:  Code: Full

## 2024-12-03 NOTE — CONSULT NOTE ADULT - SUBJECTIVE AND OBJECTIVE BOX
HPI:  59y Female with DMT2 on trulicity/metformin, HTN on amlodipine, and T cell lymphoma on chemo presents to ED with progressive SOB and weakness for 1 week and no PO intake in the last day. Patient also endorses black stool. Patient was admitted at [facility] from 10/25-10/29/24 during which she was diagnosed with a tonsillar mass with lymphadenopathy, concerning for T-Cell lymphoma, and new HTN. She was readmitted to [facility] from 11/1-11/7/24 where she was initiated on chemotherapy and allopurinol. She was also found to have new DMT2 and was started on trulicity and metformin. Last chemo on 11/25 with Brentuximab/Doxorubicin/Cytoxan/onpro. Denies chest or abdominal pain, all other ROS negative.    In the ED, patient was tachycardic and tachypneic, arrived on NRBM @15L/min. Patient was leukopenic to 0.12, anemic to 7.3, thrombocytopenic to 73K. Serum glucose was 554. Anion gap elevated to 35. Blood lactate 4.1. UA positive for glucosuria and ketones. Fluids and abx initiated.  (02 Dec 2024 19:21)      PAST MEDICAL & SURGICAL HISTORY:  Tonsillar mass      Tongue lesion      Benign essential HTN      S/P appendectomy          Allergies    No Known Allergies    Intolerances        MEDICATIONS  (STANDING):  cefepime   IVPB      cefepime   IVPB 2000 milliGRAM(s) IV Intermittent every 8 hours  chlorhexidine 2% Cloths 1 Application(s) Topical <User Schedule>  dextrose 5% + lactated ringers 1000 milliLiter(s) (150 mL/Hr) IV Continuous <Continuous>  insulin regular Infusion 14 Unit(s)/Hr (14 mL/Hr) IV Continuous <Continuous>  lactated ringers Bolus 1000 milliLiter(s) IV Bolus once  magnesium sulfate  IVPB 2 Gram(s) IV Intermittent once  magnesium sulfate  IVPB 2 Gram(s) IV Intermittent once  potassium chloride  10 mEq/100 mL IVPB 10 milliEquivalent(s) IV Intermittent every 1 hour  potassium phosphate / sodium phosphate Powder (PHOS-NaK) 2 Packet(s) Oral every 2 hours    MEDICATIONS  (PRN):  ondansetron Injectable 4 milliGRAM(s) IV Push every 6 hours PRN Nausea and/or Vomiting      FAMILY HISTORY:  No pertinent family history in first degree relatives        SOCIAL HISTORY: No EtOH, no tobacco    REVIEW OF SYSTEMS:    CONSTITUTIONAL: No weakness, fevers or chills  EYES/ENT: No visual changes;  No vertigo or throat pain   NECK: No pain or stiffness  RESPIRATORY: No cough, wheezing, hemoptysis; No shortness of breath  CARDIOVASCULAR: No chest pain or palpitations  GASTROINTESTINAL: No abdominal or epigastric pain. No nausea, vomiting, or hematemesis; No diarrhea or constipation. No melena or hematochezia.  GENITOURINARY: No dysuria, frequency or hematuria  NEUROLOGICAL: No numbness or weakness  SKIN: No itching, burning, rashes, or lesions   All other review of systems is negative unless indicated above.    Height (cm): 165.1 (12-02 @ 20:30)  Weight (kg): 95.8 (12-02 @ 20:30)  BMI (kg/m2): 35.1 (12-02 @ 20:30)  BSA (m2): 2.02 (12-02 @ 20:30)    T(F): 99.3 (12-03-24 @ 12:00), Max: 99.9 (12-02-24 @ 13:26)  HR: 125 (12-03-24 @ 12:00)  BP: 140/62 (12-03-24 @ 12:00)  RR: 23 (12-03-24 @ 12:00)  SpO2: 100% (12-03-24 @ 12:00)  Wt(kg): --    CONSTITUTIONAL: AOx3 but lethargic  EYES: PERRLA and symmetric, EOMI, No conjunctival or scleral injection, non-icteric  NECK: Hyperpigmented, large tender left neck mass  RESP: Tachypneic, no use of accessory muscles; CTA b/l, no WRR  CV: RRR, +S1S2, no MRG; no JVD; no peripheral edema  GI: Soft, NT, ND, no rebound, no guarding; no palpable masses  LYMPH: No cervical LAD or tenderness; no axillary LAD or tenderness; no inguinal LAD or tenderness  MSK: Normal gait; Normal ROM without pain  SKIN: No rashes or ulcers noted; no subcutaneous nodules or induration palpable  PSYCH: Confused but AO x 3                        7.5    0.06  )-----------( 38       ( 03 Dec 2024 04:38 )             20.3       12-03    148[H]  |  115[H]  |  11  ----------------------------<  181[H]  3.3[L]   |  18[L]  |  0.57    Ca    8.9      03 Dec 2024 08:40  Phos  1.1     12-03  Mg     1.50     12-03    TPro  5.6[L]  /  Alb  2.4[L]  /  TBili  0.6  /  DBili  x   /  AST  9   /  ALT  16  /  AlkPhos  80  12-03      Magnesium: 1.50 mg/dL (12-03 @ 08:40)  Phosphorus: 1.1 mg/dL (12-03 @ 08:40)  Magnesium: 1.60 mg/dL (12-03 @ 04:38)  Phosphorus: 1.5 mg/dL (12-03 @ 04:38)  Magnesium: 1.50 mg/dL (12-03 @ 00:49)  Phosphorus: 1.5 mg/dL (12-03 @ 00:49)  Lactate Dehydrogenase, Serum: 445 U/L (12-03 @ 00:49)  Uric Acid: 3.9 mg/dL (12-03 @ 00:49)  Magnesium: 1.60 mg/dL (12-02 @ 21:23)  Phosphorus: 0.9 mg/dL (12-02 @ 21:23)  Uric Acid: 4.2 mg/dL (12-02 @ 21:23)  Lactate Dehydrogenase, Serum: 480 U/L (12-02 @ 21:23)      PT/INR - ( 03 Dec 2024 00:49 )   PT: 13.0 sec;   INR: 1.09 ratio         PTT - ( 03 Dec 2024 00:49 )  PTT:26.8 sec    Clean Catch Clean Catch (Midstream)  12-02 @ 15:30   <10,000 CFU/mL Normal Urogenital Myehsa  --  --      .Blood BLOOD  12-02 @ 15:10   Growth in aerobic bottle: Gram Negative Rods  Growth in anaerobic bottle: Gram Negative Rods  --    Growth in aerobic bottle: Gram Negative Rods  Growth in anaerobic bottle: Gram Negative Rods      .Blood BLOOD  12-02 @ 14:50   Growth in aerobic bottle: Gram Negative Rods  Growth in anaerobic bottle: Gram Negative Rods  Direct identification is available within approximately 3-5  hours either by Blood Panel Multiplexed PCR or Direct  MALDI-TOF. Details: [email or web address]  --  Blood Culture PCR       HPI:  59y Female with DMT2 on trulicity/metformin, HTN on amlodipine, and T cell lymphoma on chemo presents to ED with progressive SOB and weakness for 1 week and no PO intake in the last day. Patient also endorses black stool. Patient was admitted at [facility] from 10/25-10/29/24 during which she was diagnosed with a tonsillar mass with lymphadenopathy, concerning for T-Cell lymphoma, and new HTN. She was readmitted to [facility] from 11/1-11/7/24 where she was initiated on chemotherapy and allopurinol. She was also found to have new DMT2 and was started on trulicity and metformin. Last chemo on 11/25 with Brentuximab/Doxorubicin/Cytoxan/onpro. Denies chest or abdominal pain, all other ROS negative.    In the ED, patient was tachycardic and tachypneic, arrived on NRBM @15L/min. Patient was leukopenic to 0.12, anemic to 7.3, thrombocytopenic to 73K. Serum glucose was 554. Anion gap elevated to 35. Blood lactate 4.1. UA positive for glucosuria and ketones. Fluids and abx initiated.  (02 Dec 2024 19:21)      PAST MEDICAL & SURGICAL HISTORY:  Tonsillar mass      Tongue lesion      Benign essential HTN      S/P appendectomy          Allergies    No Known Allergies    Intolerances        MEDICATIONS  (STANDING):  cefepime   IVPB      cefepime   IVPB 2000 milliGRAM(s) IV Intermittent every 8 hours  chlorhexidine 2% Cloths 1 Application(s) Topical <User Schedule>  dextrose 5% + lactated ringers 1000 milliLiter(s) (150 mL/Hr) IV Continuous <Continuous>  insulin regular Infusion 14 Unit(s)/Hr (14 mL/Hr) IV Continuous <Continuous>  lactated ringers Bolus 1000 milliLiter(s) IV Bolus once  magnesium sulfate  IVPB 2 Gram(s) IV Intermittent once  magnesium sulfate  IVPB 2 Gram(s) IV Intermittent once  potassium chloride  10 mEq/100 mL IVPB 10 milliEquivalent(s) IV Intermittent every 1 hour  potassium phosphate / sodium phosphate Powder (PHOS-NaK) 2 Packet(s) Oral every 2 hours    MEDICATIONS  (PRN):  ondansetron Injectable 4 milliGRAM(s) IV Push every 6 hours PRN Nausea and/or Vomiting      FAMILY HISTORY:  No pertinent family history in first degree relatives        SOCIAL HISTORY: No EtOH, no tobacco    REVIEW OF SYSTEMS:    CONSTITUTIONAL: lethargy  EYES/ENT: No visual changes;  No vertigo or throat pain   NECK: No pain or stiffness  RESPIRATORY: No cough, wheezing, hemoptysis; No shortness of breath  CARDIOVASCULAR: No chest pain or palpitations  GASTROINTESTINAL: No abdominal or epigastric pain. No nausea, vomiting, or hematemesis; No diarrhea or constipation. No melena or hematochezia.  GENITOURINARY: No dysuria, frequency or hematuria  NEUROLOGICAL: No numbness or weakness  All other review of systems is negative unless indicated above.    Height (cm): 165.1 (12-02 @ 20:30)  Weight (kg): 95.8 (12-02 @ 20:30)  BMI (kg/m2): 35.1 (12-02 @ 20:30)  BSA (m2): 2.02 (12-02 @ 20:30)    T(F): 99.3 (12-03-24 @ 12:00), Max: 99.9 (12-02-24 @ 13:26)  HR: 125 (12-03-24 @ 12:00)  BP: 140/62 (12-03-24 @ 12:00)  RR: 23 (12-03-24 @ 12:00)  SpO2: 100% (12-03-24 @ 12:00)  Wt(kg): --    CONSTITUTIONAL: AOx3 but lethargic  EYES: PERRLA and symmetric, EOMI, No conjunctival or scleral injection, non-icteric  RESP: CTA b/l  CV: RRR  GI: Soft, NT, ND  LYMPH: No cervical LAD or tenderness; no axillary LAD or tenderness; no inguinal LAD or tenderness  MSK: Normal gait; Normal ROM without pain  PSYCH: Confused but AO x 3                        7.5    0.06  )-----------( 38       ( 03 Dec 2024 04:38 )             20.3       12-03    148[H]  |  115[H]  |  11  ----------------------------<  181[H]  3.3[L]   |  18[L]  |  0.57    Ca    8.9      03 Dec 2024 08:40  Phos  1.1     12-03  Mg     1.50     12-03    TPro  5.6[L]  /  Alb  2.4[L]  /  TBili  0.6  /  DBili  x   /  AST  9   /  ALT  16  /  AlkPhos  80  12-03      Magnesium: 1.50 mg/dL (12-03 @ 08:40)  Phosphorus: 1.1 mg/dL (12-03 @ 08:40)  Magnesium: 1.60 mg/dL (12-03 @ 04:38)  Phosphorus: 1.5 mg/dL (12-03 @ 04:38)  Magnesium: 1.50 mg/dL (12-03 @ 00:49)  Phosphorus: 1.5 mg/dL (12-03 @ 00:49)  Lactate Dehydrogenase, Serum: 445 U/L (12-03 @ 00:49)  Uric Acid: 3.9 mg/dL (12-03 @ 00:49)  Magnesium: 1.60 mg/dL (12-02 @ 21:23)  Phosphorus: 0.9 mg/dL (12-02 @ 21:23)  Uric Acid: 4.2 mg/dL (12-02 @ 21:23)  Lactate Dehydrogenase, Serum: 480 U/L (12-02 @ 21:23)      PT/INR - ( 03 Dec 2024 00:49 )   PT: 13.0 sec;   INR: 1.09 ratio         PTT - ( 03 Dec 2024 00:49 )  PTT:26.8 sec    Clean Catch Clean Catch (Midstream)  12-02 @ 15:30   <10,000 CFU/mL Normal Urogenital Myesha  --  --      .Blood BLOOD  12-02 @ 15:10   Growth in aerobic bottle: Gram Negative Rods  Growth in anaerobic bottle: Gram Negative Rods  --    Growth in aerobic bottle: Gram Negative Rods  Growth in anaerobic bottle: Gram Negative Rods      .Blood BLOOD  12-02 @ 14:50   Growth in aerobic bottle: Gram Negative Rods  Growth in anaerobic bottle: Gram Negative Rods  Direct identification is available within approximately 3-5  hours either by Blood Panel Multiplexed PCR or Direct  MALDI-TOF. Details: [email or web address]  --  Blood Culture PCR

## 2024-12-03 NOTE — PROGRESS NOTE ADULT - SUBJECTIVE AND OBJECTIVE BOX
INTERVAL HPI/OVERNIGHT EVENTS: Patient received 2 units of blood, with improvement in Hgb.    SUBJECTIVE: Patient seen and examined at bedside.       VITAL SIGNS:  ICU Vital Signs Last 24 Hrs  T(C): 36.9 (03 Dec 2024 04:00), Max: 37.7 (02 Dec 2024 13:26)  T(F): 98.4 (03 Dec 2024 04:00), Max: 99.9 (02 Dec 2024 13:26)  HR: 122 (03 Dec 2024 07:13) (120 - 144)  BP: 118/63 (03 Dec 2024 07:00) (110/69 - 137/65)  BP(mean): 80 (03 Dec 2024 07:00) (76 - 88)  ABP: --  ABP(mean): --  RR: 19 (03 Dec 2024 07:13) (17 - 28)  SpO2: 100% (03 Dec 2024 07:13) (95% - 100%)    O2 Parameters below as of 03 Dec 2024 07:13  Patient On (Oxygen Delivery Method): nasal cannula  O2 Flow (L/min): 5      Plateau pressure:   P/F ratio:     12-02 @ 07:01  -  12-03 @ 07:00  --------------------------------------------------------  IN: 2392 mL / OUT: 2450 mL / NET: -58 mL      CAPILLARY BLOOD GLUCOSE  POCT Blood Glucose.: 259 mg/dL (03 Dec 2024 07:00)    ECG: Sinus tach.    PHYSICAL EXAM:  CONSTITUTIONAL: AOx3 but lethargic  EYES: PERRLA and symmetric, EOMI, No conjunctival or scleral injection, non-icteric  NECK: Hyperpigmented, large left neck mass  RESP: Tachypneic, no use of accessory muscles; CTA b/l, no WRR  CV: RRR, +S1S2, no MRG; no JVD; no peripheral edema  GI: Soft, NT, ND, no rebound, no guarding; no palpable masses  LYMPH: No cervical LAD or tenderness; no axillary LAD or tenderness; no inguinal LAD or tenderness  MSK: Normal gait; Normal ROM without pain  SKIN: No rashes or ulcers noted; no subcutaneous nodules or induration palpable  PSYCH: Confused but A+O x 3    MEDICATIONS:  MEDICATIONS  (STANDING):  cefepime   IVPB      cefepime   IVPB 2000 milliGRAM(s) IV Intermittent every 8 hours  chlorhexidine 2% Cloths 1 Application(s) Topical <User Schedule>  heparin   Injectable 5000 Unit(s) SubCutaneous every 8 hours  insulin regular Infusion 14 Unit(s)/Hr (14 mL/Hr) IV Continuous <Continuous>  lactated ringers 1000 milliLiter(s) (150 mL/Hr) IV Continuous <Continuous>  sodium phosphate 30 milliMole(s)/500 mL IVPB 30 milliMole(s) IV Intermittent once    MEDICATIONS  (PRN):    ALLERGIES:  Allergies    No Known Allergies    Intolerances        LABS:                        7.5    0.06  )-----------( 38       ( 03 Dec 2024 04:38 )             20.3     12-03    146[H]  |  113[H]  |  14  ----------------------------<  272[H]  3.8   |  13[L]  |  0.64    Ca    8.8      03 Dec 2024 04:38  Phos  1.5     12-03  Mg     1.60     12-03    TPro  5.3[L]  /  Alb  2.4[L]  /  TBili  0.5  /  DBili  x   /  AST  8   /  ALT  17  /  AlkPhos  79  12-03    PT/INR - ( 03 Dec 2024 00:49 )   PT: 13.0 sec;   INR: 1.09 ratio         PTT - ( 03 Dec 2024 00:49 )  PTT:26.8 sec  Urinalysis Basic - ( 03 Dec 2024 04:38 )    Color: x / Appearance: x / SG: x / pH: x  Gluc: 272 mg/dL / Ketone: x  / Bili: x / Urobili: x   Blood: x / Protein: x / Nitrite: x   Leuk Esterase: x / RBC: x / WBC x   Sq Epi: x / Non Sq Epi: x / Bacteria: x        RADIOLOGY & ADDITIONAL TESTS: Reviewed.   INTERVAL HPI/OVERNIGHT EVENTS: Patient received 2 units of blood, with improvement in Hgb. Blood culture positive for E. Coli.    SUBJECTIVE: Patient seen and examined at bedside. She reports feeling better and admits diarrhea. Denies chest pain, shortness of breath, fatigue, abdominal pain, any other symptoms.      VITAL SIGNS:  ICU Vital Signs Last 24 Hrs  T(C): 36.9 (03 Dec 2024 04:00), Max: 37.7 (02 Dec 2024 13:26)  T(F): 98.4 (03 Dec 2024 04:00), Max: 99.9 (02 Dec 2024 13:26)  HR: 122 (03 Dec 2024 07:13) (120 - 144)  BP: 118/63 (03 Dec 2024 07:00) (110/69 - 137/65)  BP(mean): 80 (03 Dec 2024 07:00) (76 - 88)  ABP: --  ABP(mean): --  RR: 19 (03 Dec 2024 07:13) (17 - 28)  SpO2: 100% (03 Dec 2024 07:13) (95% - 100%)    O2 Parameters below as of 03 Dec 2024 07:13  Patient On (Oxygen Delivery Method): nasal cannula  O2 Flow (L/min): 5      Plateau pressure:   P/F ratio:     12-02 @ 07:01  -  12-03 @ 07:00  --------------------------------------------------------  IN: 2392 mL / OUT: 2450 mL / NET: -58 mL    I&O's Detail    02 Dec 2024 07:01  -  03 Dec 2024 07:00  --------------------------------------------------------  IN:    Insulin: 10 mL    Insulin: 82 mL    IV PiggyBack: 800 mL    Lactated Ringers w/ Additives: 1200 mL    PRBCs (Packed Red Blood Cells): 300 mL  Total IN: 2392 mL    OUT:    Voided (mL): 2450 mL  Total OUT: 2450 mL    Total NET: -58 mL      CAPILLARY BLOOD GLUCOSE  POCT Blood Glucose.: 259 mg/dL (03 Dec 2024 07:00)    ECG: Sinus tach.    PHYSICAL EXAM:  CONSTITUTIONAL: AOx3 but lethargic  EYES: PERRLA and symmetric, EOMI, No conjunctival or scleral injection, non-icteric  NECK: Hyperpigmented, large tender left neck mass  RESP: Tachypneic, no use of accessory muscles; CTA b/l, no WRR  CV: RRR, +S1S2, no MRG; no JVD; no peripheral edema  GI: Soft, NT, ND, no rebound, no guarding; no palpable masses  LYMPH: No cervical LAD or tenderness; no axillary LAD or tenderness; no inguinal LAD or tenderness  MSK: Normal gait; Normal ROM without pain  SKIN: No rashes or ulcers noted; no subcutaneous nodules or induration palpable  PSYCH: Confused but AO x 3    MEDICATIONS:  MEDICATIONS  (STANDING):  cefepime   IVPB      cefepime   IVPB 2000 milliGRAM(s) IV Intermittent every 8 hours  chlorhexidine 2% Cloths 1 Application(s) Topical <User Schedule>  heparin   Injectable 5000 Unit(s) SubCutaneous every 8 hours  insulin regular Infusion 14 Unit(s)/Hr (14 mL/Hr) IV Continuous <Continuous>  lactated ringers 1000 milliLiter(s) (150 mL/Hr) IV Continuous <Continuous>  sodium phosphate 30 milliMole(s)/500 mL IVPB 30 milliMole(s) IV Intermittent once    MEDICATIONS  (PRN):    ALLERGIES:  Allergies    No Known Allergies    Intolerances        LABS:                        7.5    0.06  )-----------( 38       ( 03 Dec 2024 04:38 )             20.3     12-03    146[H]  |  113[H]  |  14  ----------------------------<  272[H]  3.8   |  13[L]  |  0.64    Ca    8.8      03 Dec 2024 04:38  Phos  1.5     12-03  Mg     1.60     12-03    TPro  5.3[L]  /  Alb  2.4[L]  /  TBili  0.5  /  DBili  x   /  AST  8   /  ALT  17  /  AlkPhos  79  12-03    PT/INR - ( 03 Dec 2024 00:49 )   PT: 13.0 sec;   INR: 1.09 ratio         PTT - ( 03 Dec 2024 00:49 )  PTT:26.8 sec  Urinalysis Basic - ( 03 Dec 2024 04:38 )    Color: x / Appearance: x / SG: x / pH: x  Gluc: 272 mg/dL / Ketone: x  / Bili: x / Urobili: x   Blood: x / Protein: x / Nitrite: x   Leuk Esterase: x / RBC: x / WBC x   Sq Epi: x / Non Sq Epi: x / Bacteria: x        RADIOLOGY & ADDITIONAL TESTS: Reviewed.

## 2024-12-03 NOTE — CONSULT NOTE ADULT - SUBJECTIVE AND OBJECTIVE BOX
HPI:  59y Female with DMT2 on trulicity/metformin, HTN on amlodipine, and lymphoma on chemo presents to ED with progressive SOB and weakness for 1 week and no PO intake in the last day. Patient also endorses black stool. Patient was admitted at [facility] from 10/25-10/29/24 during which she was diagnosed with a tonsillar mass with lymphadenopathy, concerning for T-Cell lymphoma, and new HTN. She was readmitted to [facility] from -24 where she was initiated on chemotherapy and allopurinol. She was also found to have new DMT2 and was started on trulicity and metformin. Last chemo on  with Brentuximab/Doxorubicin/Cytoxan/onpro. Denies chest or abdominal pain, all other ROS negative.    In the ED, patient was tachycardic and tachypneic, arrived on NRBM @15L/min. Patient was leukopenic to 0.12, anemic to 7.3, thrombocytopenic to 73K. Serum glucose was 554. Anion gap elevated to 35. Blood lactate 4.1. UA positive for glucosuria and ketones. Fluids and abx initiated.  (02 Dec 2024 19:21)      Endocrine History:    Patient is a 59 y.o. female with diagnosed with T cell lymphoma in 2024 and following with heme/onc for chemo. Her last chemo on  with Brentuximab/Doxorubicin/Cytoxan/onpro. While inpatient in 2024 she was diagnosed with Type 2 DM and Hba1c was 11.9% at the time. She was seen by our endocrine team at the time and discharged on Trulicity 0.75 mg SQ weekly and Metformin 500 mg BID which was titrated up to 1,000 mg BID as outpatient. She was supposed to follow up at our endocrine clinic in [place] but appointment wwas never scheduled.     She reports blood glucose levels were in the 200s. Never saw ophthalmology. No blurry vision. No neuropathy or foot complaints. Not aware of kidney disease.    She was admitted on 24 with DKA and c/o SOB with weakness. No nausea, vomiting or abdominal pain. She does report diarrhea. Also poor appetite and polydipsia.     She is currently in MICU on IVFs, insulin drip at 14 units per hour and receiving supplementation for low magnesium, potassium and phosphorus.    She reports feeling better now.     Blood glucose levels are in the upper 10ss and low 200s on insulin drip  Last CO2 was 18 with AG of 15, BHB 2.1          PAST MEDICAL & SURGICAL HISTORY:  Type 2 DM  T cell lymphoma  Tonsillar mass      Tongue lesion      Benign essential HTN      S/P appendectomy          FAMILY HISTORY:  No pertinent family history in first degree relatives  Denies family history of diabetes  Both parents are  but not aware of their medical history        Social History:  No smoking or ETOH use    Outpatient Medications:  Trulicity ? 1.5 mg SQ weekly  Metformin 1,000 mg BID  Amlodipine    MEDICATIONS  (STANDING):  cefepime   IVPB      cefepime   IVPB 2000 milliGRAM(s) IV Intermittent every 8 hours  chlorhexidine 2% Cloths 1 Application(s) Topical <User Schedule>  dextrose 5% + lactated ringers 1000 milliLiter(s) (150 mL/Hr) IV Continuous <Continuous>  insulin regular Infusion 14 Unit(s)/Hr (14 mL/Hr) IV Continuous <Continuous>  lactated ringers Bolus 1000 milliLiter(s) IV Bolus once  magnesium sulfate  IVPB 2 Gram(s) IV Intermittent once  magnesium sulfate  IVPB 2 Gram(s) IV Intermittent once  potassium chloride  10 mEq/100 mL IVPB 10 milliEquivalent(s) IV Intermittent every 1 hour  potassium phosphate / sodium phosphate Powder (PHOS-NaK) 2 Packet(s) Oral every 2 hours    MEDICATIONS  (PRN):  ondansetron Injectable 4 milliGRAM(s) IV Push every 6 hours PRN Nausea and/or Vomiting      Allergies    No Known Allergies    Intolerances      Review of Systems:  Constitutional: Reports fever  Eyes: No blurry vision  Neuro: No tremors  HEENT: No pain  Cardiovascular: No chest pain, palpitations  Respiratory: Reports SOB, no cough  GI: No nausea, vomiting, abdominal pain, does report diarrhea  : No dysuria  Skin: no rash  Psych: no depression  Endocrine: no polyuria, reports polydipsia  Hem/lymph: no swelling    ALL OTHER SYSTEMS REVIEWED AND NEGATIVE      PHYSICAL EXAM:  VITALS: T(C): 37.4 (24 @ 12:00)  T(F): 99.3 (24 @ 12:00), Max: 99.9 (24 @ 13:26)  HR: 125 (24 @ 12:00) (120 - 144)  BP: 140/62 (24 @ 12:00) (106/91 - 140/62)  RR:  (17 - 28)  SpO2:  (95% - 100%)  Wt(kg): --  GENERAL: NAD  EYES: No proptosis, no lid lag, anicteric  HEENT:  Atraumatic, Normocephalic  THYROID: Normal size, no palpable nodules  RESPIRATORY: Clear to auscultation bilaterally; No rales, rhonchi, wheezing  CARDIOVASCULAR: Regular rate and rhythm; no peripheral edema  GI: Soft, nontender, non distended, normal bowel sounds  SKIN: Dry, intact, No rashes or lesions  MUSCULOSKELETAL: Full range of motion, normal strength  NEURO: sensation intact, extraocular movements intact, no tremor  PSYCH: normal affect, normal mood      POCT Blood Glucose.: 227 mg/dL (24 @ 12:04)  POCT Blood Glucose.: 215 mg/dL (24 @ 11:01)  POCT Blood Glucose.: 197 mg/dL (24 @ 10:04)  POCT Blood Glucose.: 199 mg/dL (24 @ 09:03)  POCT Blood Glucose.: 190 mg/dL (24 @ 08:08)  POCT Blood Glucose.: 259 mg/dL (24 @ 07:00)  POCT Blood Glucose.: 263 mg/dL (24 @ 06:03)  POCT Blood Glucose.: 253 mg/dL (24 @ 05:09)  POCT Blood Glucose.: 304 mg/dL (24 @ 04:03)  POCT Blood Glucose.: 341 mg/dL (24 @ 02:57)  POCT Blood Glucose.: 340 mg/dL (24 @ 02:03)  POCT Blood Glucose.: 365 mg/dL (24 @ 00:54)  POCT Blood Glucose.: 361 mg/dL (24 @ 00:08)  POCT Blood Glucose.: 365 mg/dL (24 @ 23:08)  POCT Blood Glucose.: 375 mg/dL (24 @ 22:03)  POCT Blood Glucose.: 454 mg/dL (24 @ 21:04)  POCT Blood Glucose.: 475 mg/dL (24 @ 20:30)  POCT Blood Glucose.: 472 mg/dL (24 @ 19:48)  POCT Blood Glucose.: 446 mg/dL (24 @ 18:39)  POCT Blood Glucose.: 475 mg/dL (24 @ 18:37)  POCT Blood Glucose.: 504 mg/dL (24 @ 16:23)  POCT Blood Glucose.: 530 mg/dL (24 @ 16:21)  POCT Blood Glucose.: 506 mg/dL (24 @ 13:25)                            7.5    0.06  )-----------( 38       ( 03 Dec 2024 04:38 )             20.3           148[H]  |  115[H]  |  11  ----------------------------<  181[H]  3.3[L]   |  18[L]  |  0.57    eGFR: 105    Ca    8.9        Mg     1.50       Phos  1.1         TPro  5.6[L]  /  Alb  2.4[L]  /  TBili  0.6  /  DBili  x   /  AST  9   /  ALT  16  /  AlkPhos  80        Thyroid Function Tests:   @ 00:49 TSH 1.74 FreeT4 -- T3 -- Anti TPO -- Anti Thyroglobulin Ab -- TSI --           Chol 84 Direct LDL -- LDL calculated 46 HDL 15[L] Trig 121,  Chol 113 Direct LDL -- LDL calculated 58 HDL 39[L] Trig 82    Radiology:

## 2024-12-03 NOTE — PROGRESS NOTE ADULT - ATTENDING COMMENTS
59 F with T cell lymphoma on chemo m, htn, DM here with sepsis due to e coli bacteremia and found to have HAGMA/LOUISA/lactic acidosis due to DKA    on insulin gtt, q1h finger sticks  c/w IVF, will give another fluid bolus  monitor for hypokalemia and hypophosphatemia   unclear source of bacteremia, c/w cefepime and wait for sensitivities  suspect acute hypoxemic respiratory failure due to sepsis due to e coli bacteremia, wean as tolerated  suspect acute metabolic encephalopathy due to DKA, sepsis, slowly improving as patient more alert this AM than overnight  f/u heme/onc reccs, ?filgastrim; acute blood loss anemia likely due to cytopenias from chemo, s/p transfusion  f/u endocrine    lvsf normal  full code per patient  scds

## 2024-12-03 NOTE — CHART NOTE - NSCHARTNOTEFT_GEN_A_CORE
: Kamlesh    INDICATION: Heart/Lung POCUS    PROCEDURE:  [x] LIMITED ECHO  [ ] LIMITED CHEST  [ ] LIMITED RETROPERITONEAL  [ ] LIMITED ABDOMINAL  [ ] LIMITED DVT  [ ] NEEDLE GUIDANCE VASCULAR  [ ] NEEDLE GUIDANCE THORACENTESIS  [ ] NEEDLE GUIDANCE PARACENTESIS  [ ] NEEDLE GUIDANCE PERICARDIOCENTESIS  [ ] OTHER    FINDINGS:       INTERPRETATION:      Images stored on Qpath. : Kamlesh    INDICATION: Heart/Lung POCUS    PROCEDURE:  [x] LIMITED ECHO  [ ] LIMITED CHEST  [ ] LIMITED RETROPERITONEAL  [ ] LIMITED ABDOMINAL  [ ] LIMITED DVT  [ ] NEEDLE GUIDANCE VASCULAR  [ ] NEEDLE GUIDANCE THORACENTESIS  [ ] NEEDLE GUIDANCE PARACENTESIS  [ ] NEEDLE GUIDANCE PERICARDIOCENTESIS  [ ] OTHER    FINDINGS: Tachycardic, LV systolic function appears grossly normal. RV appears smaller than LV. No pericardial effusion seen. IVC appears collapsible and is measured at 1.58cm. Predominant A-line pattern on lung POCUS.      Images stored on Qpath. : Kamlesh    INDICATION: acute hypoxemic respiratory failure     PROCEDURE:  [x] LIMITED ECHO  [ x] LIMITED CHEST  [ ] LIMITED RETROPERITONEAL  [ ] LIMITED ABDOMINAL  [ ] LIMITED DVT  [ ] NEEDLE GUIDANCE VASCULAR  [ ] NEEDLE GUIDANCE THORACENTESIS  [ ] NEEDLE GUIDANCE PARACENTESIS  [ ] NEEDLE GUIDANCE PERICARDIOCENTESIS  [ ] OTHER    FINDINGS: Tachycardic, LV systolic function appears grossly normal. RV appears smaller than LV. No pericardial effusion seen. IVC appears collapsible and is measured at 1.58cm. Predominant A-line pattern on lung POCUS.      Images stored on Qpath.    Attending Attestation:  I was present during the key portions of the procedure and immediately available during the entire procedure.  I have personally reviewed the images and agree with the interpretation above by the resident/fellow/ACP.    Eder Gaxiola MD  Attending  Pulmonary & Critical Care Medicine

## 2024-12-03 NOTE — CONSULT NOTE ADULT - ASSESSMENT
59y Female with DMT2 on trulicity/metformin, HTN on amlodipine, and recently diagnosed T cell lymphoma (dx 10/25/24) on BVCHP presents to ED with progressive SOB and weakness for 1 week and no PO intake in the last day. Patient also endorses black stool. Last chemotherapy C2D1 BVCHP was on 11/25/24 with  Brentuximab/Doxorubicin/Cytoxan/onpro. Pt was found to have DKA and neutropenic fever.      # T Cell Lymphoma  # Neutropenic Fever  # DKA  # Dark Stool  - Dx T cell lymphoma on 10/25/24 from left tonsillar mass biopsy. Initially got cytoxan+adriamycin on 11/2/24. After tumor board discussion, pt was switched to BVCHP regimen and received C1D1 on 11/6/24. C2D1 [facility]CHP was on 11/25/24 with Onpro.  - as patient got Neulasta (peg-Filgrastrim) on 11/25/24 it can take upto two weeks to see increase in the WBC count. At this time, we will not give any futher GCSF support  - continue with broad spectrum antibiotics  - f/u BCx, stool culture, GI PCR  - DKA managements as per primary team  - Transfuse to keep Hgb>7. Transfuse platelet if count <10k,  bleeding and count <50k    --incomplete note--    Woodrow Rosales MD  PGY4  Hematology-Oncology Fellow  [facility]/GERI 59y Female with DMT2 on trulicity/metformin, HTN on amlodipine, and recently diagnosed T cell lymphoma (dx 10/25/24) on BVCHP presents to ED with progressive SOB and weakness for 1 week and no PO intake in the last day. Patient also endorses black stool. Last chemotherapy C2D1 [facility]P was on 11/25/24 with  Brentuximab/Doxorubicin/Cytoxan/onpro. Pt was found to have DKA and E.Coli Bacteremia.      # T Cell Lymphoma  # Neutropenic Fever  # E. Coli Bacteremia  # DKA  # Dark Stool  - Dx T cell lymphoma on 10/25/24 from left tonsillar mass biopsy. Initially got cytoxan+adriamycin on 11/2/24. After tumor board discussion, pt was switched to BVCHP regimen and received C1D1 on 11/6/24. C2D1 [facility]P was on 11/25/24 with Onpro.  - as patient got Neulasta (peg-Filgrastrim) on 11/25/24 it can take upto two weeks to see increase in the WBC count. At this time, we will not give any futher GCSF support  - continue with broad spectrum antibiotics  - f/u BCx, stool culture, GI PCR  - DKA managements as per primary team  - Transfuse to keep Hgb>7. Transfuse platelet if count <10k,  bleeding and count <50k    --incomplete note--    Woodrow Rosales MD  PGY4  Hematology-Oncology Fellow  [facility]/GERI 59y Female with DMT2 on trulicity/metformin, HTN on amlodipine, and recently diagnosed T cell lymphoma (dx 10/25/24) on BVCHP presents to ED with progressive SOB and weakness for 1 week and no PO intake in the last day. Patient also endorses black stool. Last chemotherapy C2D1 [facility]P was on 11/25/24 with  Brentuximab/Doxorubicin/Cytoxan/onpro. Pt was found to have DKA and E.Coli Bacteremia.      # T Cell Lymphoma  # Neutropenic Fever  # E. Coli Bacteremia  # DKA  # Dark Stool  - Dx T cell lymphoma on 10/25/24 from left tonsillar mass biopsy. Initially got cytoxan+adriamycin on 11/2/24. After tumor board discussion, pt was switched to BVCHP regimen and received C1D1 on 11/6/24. C2D1 [facility]P was on 11/25/24 with Neulasta.  - as patient got Neulasta (peg-Filgrastrim) on 11/25/24 it can take upto two weeks to see increase in the WBC count. At this time, we will not give any futher GCSF support.  - continue with broad spectrum antibiotics  - f/u BCx, stool culture, GI PCR  - DKA managements as per primary team  - Transfuse to keep Hgb>7. Transfuse platelet if count <10k,  bleeding and count <50k      Woodrow Rosales MD  PGY4  Hematology-Oncology Fellow  [facility]/GERI

## 2024-12-03 NOTE — CONSULT NOTE ADULT - ASSESSMENT
59 y.o. Female with DMT2 on trulicity/metformin, HTN on amlodipine, and T cell lymphoma on chemo presents to ED with progressive SOB and weakness for 1 week and no PO intake in the last day. Admitted for DKA, neutropenic fever with sepsis (E.coli bacteremia).      High level medical complexity with high level decision making      1. DKA- h/o Type 2 DM with original Hba1c of 11.9% on 11/2/24 and now Hba1c is 10.6% today  -Blood glucose target is 100 to 180  -Keep NPO for now and once diet is ordered should be carbohydrate consistent diet  -Agree with aggressive IVFs  -Agree with continuing IV insulin drip with FS q1 hour  -Need to monitor potassium, magnesium and phos along with CO2/AG and BHB every 4 hours  -Agree with electrolyte supplementation  -Once insulin requirements decrease and AG has closed along with CO2 remaining > 20, would consider transition to basal bolus regimen  -Would check islet antibody and anti-SOHA ab to evaluate for autoimmune diabetes  -Hyperglycemia is multifactorial including dehydration, sepsis and chemo such as Brentuximab  -Will need diabetic teaching including insulin pen teach before discharge  -Discharge plan will be basal bolus and will follow up in our endocrine clinic.       2. HTN  -BP is at goal with IVFs  -Agree with holding Amlodipine for now      3. Hyperlipidemia/CV risk  -Lipids were at goal in 11/2024  -Will monitor for now and address statin use as outpatient      Case discussed with MICU team, Dr. Gaxiola    Will follow closely    Carrie Thao DO  Attending Division of Endocrinology  [phone]

## 2024-12-04 NOTE — CHART NOTE - NSCHARTNOTEFT_GEN_A_CORE
: Kamlesh    INDICATION: Heart/Lung Ultrasound    PROCEDURE:  [x] LIMITED ECHO  [ ] LIMITED CHEST  [ ] LIMITED RETROPERITONEAL  [ ] LIMITED ABDOMINAL  [ ] LIMITED DVT  [ ] NEEDLE GUIDANCE VASCULAR  [ ] NEEDLE GUIDANCE THORACENTESIS  [ ] NEEDLE GUIDANCE PARACENTESIS  [ ] NEEDLE GUIDANCE PERICARDIOCENTESIS  [ ] OTHER    FINDINGS: LV systolic function appears grossly normal. RV appears smaller than LV. No pericardial effusion seen. IVC measured at 1.58 cm. Lung POCUS with predominant A-line pattern        Images stored on Qpath. : Kamlesh    INDICATION: acute hypoxemic respiratory failure     PROCEDURE:  [x] LIMITED ECHO  [ ] LIMITED CHEST  [ ] LIMITED RETROPERITONEAL  [ ] LIMITED ABDOMINAL  [ ] LIMITED DVT  [ ] NEEDLE GUIDANCE VASCULAR  [ ] NEEDLE GUIDANCE THORACENTESIS  [ ] NEEDLE GUIDANCE PARACENTESIS  [ ] NEEDLE GUIDANCE PERICARDIOCENTESIS  [ ] OTHER    FINDINGS: LV systolic function appears grossly normal. RV appears smaller than LV. No pericardial effusion seen. IVC measured at 1.58 cm. Lung POCUS with predominant A-line pattern  interpretation: fluid responsive      Images stored on Qpath.    Attending Attestation:  I was present during the key portions of the procedure and immediately available during the entire procedure.  I have personally reviewed the images and agree with the interpretation above by the resident/fellow/ACP.    Eder Gaxiola MD  Attending  Pulmonary & Critical Care Medicine

## 2024-12-04 NOTE — DIETITIAN INITIAL EVALUATION ADULT - OTHER CALCULATIONS
Ideal Body Weight = 125lbs (+ - 10%)    Weight Hx, Per Chart Review:  224.6lbs / 102.1kg (11/2)  226lbs / 102.7kg (10/26)

## 2024-12-04 NOTE — PROGRESS NOTE ADULT - ATTENDING COMMENTS
59y Female with DMT2 on trulicity/metformin, HTN on amlodipine, and recently diagnosed T cell lymphoma (dx 10/25/24) on [facility]P presents to ED with progressive SOB and weakness for 1 week and no PO intake in the last day. Patient also endorses black stool. Last chemotherapy C2D1 [facility]P was on 11/25/24 with  Brentuximab/Doxorubicin/Cytoxan/onpro. Pt was found to have DKA and E.Coli Bacteremia.    # T Cell Lymphoma  # Neutropenic Fever  # E. Coli Bacteremia  # DKA  # Dark Stool  *GI PCR -ve  - Dx T cell lymphoma on 10/25/24 from left tonsillar mass biopsy. Initially got cytoxan+adriamycin on 11/2/24. After tumor board discussion, pt was switched to BVCHP regimen and received C1D1 on 11/6/24. C2D1 [facility]P was on 11/25/24 with Neulasta.  - as patient got Neulasta (peg-Filgrastrim) on 11/25/24 it can take upto two weeks to see increase in the WBC count. At this time, we will not give any futher GCSF support.  - continue with broad spectrum antibiotics  - Transfuse to keep Hgb>7. Transfuse platelet if count <10k,  bleeding and count <50k  - Plt count 8k today, s/p 1 U Platelet transfusion  - obtain daily CBC w Diff

## 2024-12-04 NOTE — DIETITIAN INITIAL EVALUATION ADULT - NSFNSNUTRCHEWSWALLOWFT_GEN_A_CORE
S/p MBS (10/28/2024) with finding of dysphagia and SLP recommendation at that time for 'easy to chew' foods with 'mildly thickened liquids'.   S/p MBS (10/28/2024) with finding of dysphagia and SLP recommendation at that time for 'easy to chew' foods with 'mildly thickened liquids'.  Informed resident physician.

## 2024-12-04 NOTE — DIETITIAN INITIAL EVALUATION ADULT - PERTINENT LABORATORY DATA
12-04    151[H]  |  121[H]  |  3[L]  ----------------------------<  111[H]  4.3   |  17[L]  |  0.37[L]    Ca    7.5[L]      04 Dec 2024 12:06  Phos  2.4     12-04  Mg     1.90     12-04    POCT Blood Glucose.: 140 mg/dL (12.04.24 @ 13:43)  POCT Blood Glucose.: 104: RN Notified Readback mg/dL (12.04.24 @ 13:09)  POCT Blood Glucose.: 120: RN Notified Readback mg/dL (12.04.24 @ 12:07)      A1C with Estimated Average Glucose Result: 10.6 % (12-03-24 @ 00:49)  A1C with Estimated Average Glucose Result: 12.2 % (12-02-24 @ 14:50)  A1C with Estimated Average Glucose Result: 11.9 % (11-02-24 @ 16:47)   12-04    151[H]  |  121[H]  |  3[L]  ----------------------------<  111[H]  4.3   |  17[L]  |  0.37[L]    Ca    7.5[L]      04 Dec 2024 12:06  Phos  2.4     12-04  Mg     1.90     12-04    POCT Blood Glucose.: 152 mg/dL (12.04.24 @ 14:20)  POCT Blood Glucose.: 140 mg/dL (12.04.24 @ 13:43)  POCT Blood Glucose.: 104: RN Notified Readback mg/dL (12.04.24 @ 13:09)  POCT Blood Glucose.: 120: RN Notified Readback mg/dL (12.04.24 @ 12:07)      A1C with Estimated Average Glucose Result: 10.6 % (12-03-24 @ 00:49)  A1C with Estimated Average Glucose Result: 12.2 % (12-02-24 @ 14:50)  A1C with Estimated Average Glucose Result: 11.9 % (11-02-24 @ 16:47)

## 2024-12-04 NOTE — STROKE CODE NOTE - DISPOSITION
Patient in MICU for DKA and sepsis. Has been having flucutuating LOC and mental status, but overall improvement. Today, L arm with decreased movement preceded by L facial twitching. Patient has hx of Lymphoma. Platelets with slow decline of thrombocytopenia to now 8 platelets. Out of abundance of caution, reasonable to screen for ICH given drop in PLT. Exam more consistent with toxic metabolic septic encephalopathy without lateralization to the exam. No seizures witnessed by neurology team, but episode witnessed could be consistent with focal seizure and impaired awareness. Pending noncontrast CTH. Full consult note to follow.

## 2024-12-04 NOTE — PROGRESS NOTE ADULT - SUBJECTIVE AND OBJECTIVE BOX
INTERVAL HPI/OVERNIGHT EVENTS: Patient's anion gap closed.    SUBJECTIVE: Patient seen and examined at bedside. She reports feeling well with no acute complaints. Denies chest pain, shortness of breath, fatigue, abdominal pain, any other symptoms.      VITAL SIGNS:  ICU Vital Signs Last 24 Hrs  T(C): 36.9 (04 Dec 2024 08:00), Max: 37.7 (03 Dec 2024 16:00)  T(F): 98.4 (04 Dec 2024 08:00), Max: 99.9 (03 Dec 2024 16:00)  HR: 122 (04 Dec 2024 08:00) (120 - 129)  BP: 109/53 (04 Dec 2024 08:00) (108/42 - 146/65)  BP(mean): 70 (04 Dec 2024 08:00) (58 - 94)  ABP: --  ABP(mean): --  RR: 23 (04 Dec 2024 08:00) (17 - 28)  SpO2: 100% (04 Dec 2024 08:00) (97% - 100%)    O2 Parameters below as of 04 Dec 2024 08:00  Patient On (Oxygen Delivery Method): nasal cannula  O2 Flow (L/min): 4        Plateau pressure:   P/F ratio:     12-02 @ 07:01  -  12-03 @ 07:00  --------------------------------------------------------  IN: 2392 mL / OUT: 2450 mL / NET: -58 mL        CAPILLARY BLOOD GLUCOSE  POCT Blood Glucose.: 259 mg/dL (03 Dec 2024 07:00)      I&O's Detail    03 Dec 2024 07:01  -  04 Dec 2024 07:00  --------------------------------------------------------  IN:    dextrose 5% + lactated ringers w/ Additives: 300 mL    dextrose 5% + lactated ringers w/ Additives: 150 mL    dextrose 5% + lactated ringers w/ Additives: 3000 mL    Insulin: 166 mL    Insulin: 26 mL    Insulin: 42 mL    Insulin: 24 mL    Insulin: 40 mL    IV PiggyBack: 500 mL    IV PiggyBack: 500 mL    Lactated Ringers Bolus: 1000 mL    Oral Fluid: 800 mL  Total IN: 6548 mL    OUT:    Rectal Tube (mL): 650 mL    Voided (mL): 2400 mL  Total OUT: 3050 mL    Total NET: 3498 mL      04 Dec 2024 07:01  -  04 Dec 2024 08:15  --------------------------------------------------------  IN:    dextrose 5% + lactated ringers w/ Additives: 150 mL    Insulin: 10 mL  Total IN: 160 mL    OUT:  Total OUT: 0 mL    Total NET: 160 mL      ECG: Sinus tach.    PHYSICAL EXAM:  CONSTITUTIONAL: AOx3 but lethargic  EYES: PERRLA and symmetric, EOMI, No conjunctival or scleral injection, non-icteric  NECK: Hyperpigmented, large tender left neck mass  RESP: Tachypneic, no use of accessory muscles; CTA b/l, no WRR  CV: RRR, +S1S2, no MRG; no JVD; no peripheral edema  GI: Soft, NT, ND, no rebound, no guarding; no palpable masses  LYMPH: No cervical LAD or tenderness; no axillary LAD or tenderness; no inguinal LAD or tenderness  MSK: Normal gait; Normal ROM without pain  SKIN: No rashes or ulcers noted; no subcutaneous nodules or induration palpable  PSYCH: Confused but AO x 3    MEDICATIONS  (STANDING):  cefepime   IVPB      cefepime   IVPB 2000 milliGRAM(s) IV Intermittent every 8 hours  chlorhexidine 2% Cloths 1 Application(s) Topical <User Schedule>  dextrose 5% + lactated ringers 1000 milliLiter(s) (150 mL/Hr) IV Continuous <Continuous>  insulin regular Infusion 10 Unit(s)/Hr (10 mL/Hr) IV Continuous <Continuous>    MEDICATIONS  (PRN):  ondansetron Injectable 4 milliGRAM(s) IV Push every 6 hours PRN Nausea and/or Vomiting      ALLERGIES:  Allergies    No Known Allergies    Intolerances        LABS:                            7.3    0.15  )-----------( 15       ( 04 Dec 2024 00:35 )             20.1     12-04    149[H]  |  119[H]  |  4[L]  ----------------------------<  141[H]  4.1   |  16[L]  |  0.41[L]    Ca    7.7[L]      04 Dec 2024 04:10  Phos  2.0     12-04  Mg     1.80     12-04    TPro  5.2[L]  /  Alb  2.0[L]  /  TBili  0.5  /  DBili  x   /  AST  23  /  ALT  17  /  AlkPhos  80  12-04    PT/INR - ( 04 Dec 2024 00:35 )   PT: 13.2 sec;   INR: 1.11 ratio         PTT - ( 04 Dec 2024 00:35 )  PTT:24.7 sec             Urinalysis Basic - ( 03 Dec 2024 04:38 )    Color: x / Appearance: x / SG: x / pH: x  Gluc: 272 mg/dL / Ketone: x  / Bili: x / Urobili: x   Blood: x / Protein: x / Nitrite: x   Leuk Esterase: x / RBC: x / WBC x   Sq Epi: x / Non Sq Epi: x / Bacteria: x        RADIOLOGY & ADDITIONAL TESTS: Reviewed.

## 2024-12-04 NOTE — STROKE CODE NOTE - NIH STROKE SCALE: 1C. LOC COMMANDS, QM
(2) Performs neither task correctly no chills/no decreased eating/drinking/no dizziness/no fever/no nausea/no tingling/no vomiting/no weakness

## 2024-12-04 NOTE — PROGRESS NOTE ADULT - ASSESSMENT
59y Female with DMT2 on trulicity/metformin, HTN on amlodipine, and recently diagnosed T cell lymphoma (dx 10/25/24) on BV[facility]P presents to ED with progressive SOB and weakness for 1 week and no PO intake in the last day. Patient also endorses black stool. Last chemotherapy C2D1 BVCHP was on 11/25/24 with  Brentuximab/Doxorubicin/Cytoxan/onpro. Pt was found to have DKA and E.Coli Bacteremia.      # T Cell Lymphoma  # Neutropenic Fever  # E. Coli Bacteremia  # DKA  # Dark Stool  *GI PCR -ve  - Dx T cell lymphoma on 10/25/24 from left tonsillar mass biopsy. Initially got cytoxan+adriamycin on 11/2/24. After tumor board discussion, pt was switched to BVCHP regimen and received C1D1 on 11/6/24. C2D1 BVCHP was on 11/25/24 with Neulasta.  - as patient got Neulasta (peg-Filgrastrim) on 11/25/24 it can take upto two weeks to see increase in the WBC count. At this time, we will not give any futher GCSF support.  - continue with broad spectrum antibiotics  - f/u BCx, stool culture, - DKA managements as per primary team  - Transfuse to keep Hgb>7. Transfuse platelet if count <10k,  bleeding and count <50k  - Plt count 8k today, s/p 1 U Platelet transfusion  - obtain daily CBC w Diff      Woodrow Rosales MD  [id number]  Hematology-Oncology Fellow  [facility]

## 2024-12-04 NOTE — DIETITIAN INITIAL EVALUATION ADULT - PERTINENT MEDS FT
MEDICATIONS  (STANDING):  acetaminophen   IVPB .. 1000 milliGRAM(s) IV Intermittent once  cefTRIAXone   IVPB 2000 milliGRAM(s) IV Intermittent every 24 hours  chlorhexidine 2% Cloths 1 Application(s) Topical <User Schedule>  dextrose 50% Injectable 12.5 milliLiter(s) IV Push once  insulin regular Infusion 1 Unit(s)/Hr (1 mL/Hr) IV Continuous <Continuous>  lactated ringers 1000 milliLiter(s) (150 mL/Hr) IV Continuous <Continuous>    MEDICATIONS  (PRN):  acetaminophen     Tablet .. 650 milliGRAM(s) Oral every 6 hours PRN Temp greater or equal to 38C (100.4F), Moderate Pain (4 - 6)  ondansetron Injectable 4 milliGRAM(s) IV Push every 6 hours PRN Nausea and/or Vomiting

## 2024-12-04 NOTE — PROGRESS NOTE ADULT - ASSESSMENT
59 y.o. Female with DMT2 on trulicity/metformin, HTN on amlodipine, and T cell lymphoma on chemo presents to ED with progressive SOB and weakness for 1 week and no PO intake in the last day. Admitted for DKA, neutropenic fever with sepsis (E.coli bacteremia).      1. DKA- h/o Type 2 DM with original Hba1c of 11.9% on 11/2/24 and now Hba1c is 10.6% today  - Blood glucose target is 140 to 180: stable today   - Pt now off dextrose IVF. If trending less than 100 consider adding back low dose dextrose to prevent hypoglycemia while on insulin gtt   - Pt with worsen clinical status this afternoon as per MICU team recommending continuing insulin gtt overnight   - FS q 1 hour  - RD consult   - Hypoglycemia Protocol   - Please follow up BARRY ab; SOHA, Zinc Transporter ab, and IA2-2 specimen received awaiting results   - Will need diabetic teaching including insulin pen teach before discharge; transitions of care pharmacist following     Discharge planning:   - TBD likely basal/bolus vs basal/trulicity/metformin   - Please send Lantus solostar pen and humalog kwikpen as test script to check insurance coverage.  Ok to send with current doses and update prior to d/c    If Lantus not covered- can try alternating with one of following   tresiba/basaglar/toujeo/Levemir    If Humalog not covered- can try alternating with one of following  novolog/apidra/admelog/fiasp    Ensure patient has working glucometer, test strips, lancets, alcohol pads, and BD yaneth pen needles    For severe hypoglycemia: Please prescribeGlucose tablets 4G (take 4 tablets) or 15G tablets for blood sugar less than 70 mG/dL repeat fingerstick in 15 minutes.     Please follow up at [facility] [place]; [phone] [place] [address]; for medicine and endocrine appointment     2. HTN  -BP is at goal with IVFs  -Agree with holding Amlodipine for now      3. Hyperlipidemia/CV risk  -Lipids were at goal in 11/2024  -Will monitor for now and address statin use as outpatient    D/w MICU team     Jael Watts  Nurse Practitioner  Division of Endocrinology & Diabetes  In house pager #[phone]    If before 9AM or after 6PM, or on weekends/holidays, please call endocrine answering service for assistance ([phone]).For nonurgent matters email [email or web address] for assistance.

## 2024-12-04 NOTE — DIETITIAN INITIAL EVALUATION ADULT - NS FNS DIET ORDER
Diet, NPO:   Except Medications     Special Instructions for Nursing:  Except Medications (12-02-24 @ 19:24) [Active]

## 2024-12-04 NOTE — PROGRESS NOTE ADULT - ASSESSMENT
59y Female with DMT2 on trulicity/metformin, HTN on amlodipine, and lymphoma on chemo presents to ED with progressive SOB and weakness for 1 week and no PO intake in the last day.    NEURO:  #AMS  -Likely iso DKA  -AOx3  -Treat underlying DKA    PULM:  #Hypoxic respiratory failure  -Unclear atelectasis vs infection  -CXR and CTA clear with no evidence of PE  -On 5L NC on admission, wean as tolerated    CARDIOVASCULAR:  #HTN  #Dehydration - IVC 1.5  -Monitor off home amlodipine iso sepsis  -Fluid bolus prn    ENDOCRINE:  #DKA  #T2DM  -On admission, serum glucose 554. Anion gap 35. Blood lactate 4.1. UA positive for glucosuria and ketones.  -Start Insulin drip and fluids with K  -Monitor BMP and BHB q4h  -A1C 12,2,   -F/u islet antibody and anti-SOHA ab to evaluate for autoimmune diabetes  -Hold home trulicity and metformin while inpatient  -Endocrinology consult appreciated  -Transition to basal bolus 12/4      HEME/ONC:  #T-cell lymphoma  -Monitor TLS labs daily  -Heme/onc consult appreciated  -Monitor CBCs  -Follows with Dr. Womack for outpatient heme-onc; last chemo on 11/25 with Brentuximab/Doxorubicin/Cytoxan/onpro    #Pancytopenic - likely 2/2 chemo vs infection  -S/p 2 units pRBCs  -Hgb >7  -Plt >10  -Heme onc consult appreciated      GI/:  No active issues  #Diarrhea  -Brown colored diarrhea noted today  -C. diff negative  -F/u GI PCR panel +/- stool culture  -Fluid replacement prn    #Diet  -NPO while treating acute DKA  -Obtain nutrition consult    RENAL:  #Hypokalemia, hypomagnesemia  -Replete electrolytes  -Monitor BMP q4h    ID:  #Sepsis  #Neutropenic fever  -E. Coli bacteremia, may be iso GI illness  -F/u pan culture  -C/w cefepime 2g q8h    PROPHYLAXIS:  -Hold heparin iso thrombocytopenia    LINES:  -L and R upper arm peripheral IV    ETHICS:  Code: Full 59y Female with DMT2 on trulicity/metformin, HTN on amlodipine, and lymphoma on chemo presents to ED with progressive SOB and weakness for 1 week and no PO intake in the last day.    NEURO:  #AMS  -Likely iso DKA  -AOx3  -Treat underlying DKA    PULM:  #Hypoxic respiratory failure  -Unclear atelectasis vs infection  -CXR and CTA clear with no evidence of PE  -On 3L NC now, wean as tolerated    CARDIOVASCULAR:  #HTN  #Dehydration - IVC 1.5  -Monitor off home amlodipine iso sepsis  -Fluid bolus prn    ENDOCRINE:  #DKA  #T2DM  -On admission, serum glucose 554. Anion gap 35. Blood lactate 4.1. UA positive for glucosuria and ketones.  -C/w reducing Insulin drip and fluids with K  -Monitor BMP and BHB q4h  -A1C 12,2,   -F/u islet antibody and anti-SOHA ab to evaluate for autoimmune diabetes  -Hold home trulicity and metformin while inpatient  -Endocrinology consult appreciated  -Plan to transition to basal bolus when bicarb >20      HEME/ONC:  #T-cell lymphoma  -Monitor TLS labs daily  -Heme/onc consult appreciated  -Monitor CBCs  -Follows with Dr. Womack for outpatient heme-onc; last chemo on 11/25 with Brentuximab/Doxorubicin/Cytoxan/onpro    #Pancytopenic - likely 2/2 chemo vs infection  -S/p 2 units pRBCs and 1 unit platelets   -Hgb >7  -Plt >10  -Monitor CBC  -Heme onc consult appreciated      GI/:  No active issues  #Diarrhea  -Brown colored diarrhea noted today  -C. diff negative  -F/u GI PCR panel +/- stool culture  -Fluid replacement prn    #Diet  -NPO while treating acute DKA  -Obtain nutrition consult    RENAL:  #Hypokalemia, hypomagnesemia  -Replete electrolytes  -Monitor BMP q4h    ID:  #Sepsis  #Neutropenic fever  -E. Coli bacteremia (pan sensitive), may be iso GI illness given diarrhea vs unknown source  -F/u pan culture  -S/p cefepime 2g q8h --> changed to CTX 2g q24h     PROPHYLAXIS:  -Hold heparin iso thrombocytopenia    LINES:  -L and R upper arm peripheral IV    ETHICS:  Code: Full

## 2024-12-04 NOTE — DIETITIAN INITIAL EVALUATION ADULT - REASON FOR ADMISSION
- 60 y/o F w DM2, HTN, and T-cell lymphoma on chemo, presenting with SOB, weakness and decrease in PO intake. On admission, serum glucose 554. Anion gap 35.  Admitted to MICU for management of DKA.  Also with E. Coli bacteremia & neutropenic fever, as well as AMS.

## 2024-12-04 NOTE — DIETITIAN INITIAL EVALUATION ADULT - BODY MASS INDEX
Letter by Lydia Millan PA-C at      Author: Lydia Millan PA-C Service: -- Author Type: --    Filed:  Date of Service:  Status: Signed       October 17, 2019     Patient: Dalila Malone   YOB: 1974   Date of Visit: 10/17/2019       To Whom It May Concern:    It is my medical opinion that Dalila Malone may return to light duty immediately with the following restrictions: 20 pound lifting limit, avoid repetitive bending and twisting as tolerated, must be able to alternate sit to stand every 30 minutes, 6-hour shifts at that time.   Work restrictions will be re-evaluated in approximately 4 weeks.    If you have any questions or concerns, please don't hesitate to call.    Sincerely,        Lydia Millan PA-C         
36.7

## 2024-12-04 NOTE — PROGRESS NOTE ADULT - SUBJECTIVE AND OBJECTIVE BOX
INTERVAL HPI/OVERNIGHT EVENTS:  Patient S&E at bedside. No o/n events,     VITAL SIGNS:  T(F): 99.2 (12-04-24 @ 12:00)  HR: 126 (12-04-24 @ 13:00)  BP: 124/71 (12-04-24 @ 13:00)  RR: 22 (12-04-24 @ 13:00)  SpO2: 100% (12-04-24 @ 13:00)  Wt(kg): --    PHYSICAL EXAM:    Constitutional: in mild distress  Eyes: EOMI, sclera non-icteric  Neck: supple  Respiratory: CTAB, no wheezes or crackles   Cardiovascular: RRR  Gastrointestinal: soft, NTND, + BS  Extremities: no cyanosis, clubbing or edema   Neurological: awake and alert      MEDICATIONS  (STANDING):  acetaminophen   IVPB .. 1000 milliGRAM(s) IV Intermittent once  cefTRIAXone   IVPB 2000 milliGRAM(s) IV Intermittent every 24 hours  chlorhexidine 2% Cloths 1 Application(s) Topical <User Schedule>  insulin regular Infusion 1 Unit(s)/Hr (1 mL/Hr) IV Continuous <Continuous>  lactated ringers 1000 milliLiter(s) (150 mL/Hr) IV Continuous <Continuous>    MEDICATIONS  (PRN):  acetaminophen     Tablet .. 650 milliGRAM(s) Oral every 6 hours PRN Temp greater or equal to 38C (100.4F), Moderate Pain (4 - 6)  ondansetron Injectable 4 milliGRAM(s) IV Push every 6 hours PRN Nausea and/or Vomiting      Allergies    No Known Allergies    Intolerances        LABS:                        7.1    0.16  )-----------( 8        ( 04 Dec 2024 07:54 )             19.5     12-04    151[H]  |  121[H]  |  3[L]  ----------------------------<  111[H]  4.3   |  17[L]  |  0.37[L]    Ca    7.5[L]      04 Dec 2024 12:06  Phos  2.4     12-04  Mg     1.90     12-04    TPro  5.0[L]  /  Alb  2.0[L]  /  TBili  0.6  /  DBili  x   /  AST  29  /  ALT  20  /  AlkPhos  77  12-04    PT/INR - ( 04 Dec 2024 00:35 )   PT: 13.2 sec;   INR: 1.11 ratio         PTT - ( 04 Dec 2024 00:35 )  PTT:24.7 sec  Urinalysis Basic - ( 04 Dec 2024 12:06 )    Color: x / Appearance: x / SG: x / pH: x  Gluc: 111 mg/dL / Ketone: x  / Bili: x / Urobili: x   Blood: x / Protein: x / Nitrite: x   Leuk Esterase: x / RBC: x / WBC x   Sq Epi: x / Non Sq Epi: x / Bacteria: x        RADIOLOGY & ADDITIONAL TESTS:  Studies reviewed.

## 2024-12-04 NOTE — DIETITIAN INITIAL EVALUATION ADULT - OTHER INFO
Per previous admission chart review, Pt. noted to have received prior detailed verbal nutrition education by [facility] (11/4) re: new DM.    Spoke with RN and endocrinology NP who report Pt. with altered mental status and education may not be feasible.         Per previous admission chart review, Pt. noted to have received prior detailed verbal nutrition education by [facility] (11/4) re: new DM.    Spoke with RN and endocrinology NP who report Pt. with altered mental status and education may not be feasible.    RDN met with Pt.  She is alert, somewhat restless.  Is unable to verbalize answers to any questions, including if she is in pain or feels hungry.  Appears to have twitching in her face.  Informed resident physicians due to RDN's concern.  RN also made aware.  Repeat fingerstick done (152mg/dL).  Noted low calcium.  Pt. now pending CT.      Education with Pt. is not appropriate or feasible at this time due to cognitive status.    Pt now code stroke.

## 2024-12-04 NOTE — PROGRESS NOTE ADULT - SUBJECTIVE AND OBJECTIVE BOX
Chief Complaint: Type 2 DM/DKA     History: Pt seen at bedside. Pt NPO on dextrose IVF at 150ml/hr and insulin gtt. Pt denies nausea and vomiting/any signs of hypoglycemia.     MEDICATIONS  (STANDING):  cefTRIAXone   IVPB 2000 milliGRAM(s) IV Intermittent every 24 hours  chlorhexidine 2% Cloths 1 Application(s) Topical <User Schedule>  insulin regular Infusion 1 Unit(s)/Hr (1 mL/Hr) IV Continuous <Continuous>  lactated ringers 1000 milliLiter(s) (150 mL/Hr) IV Continuous <Continuous>    MEDICATIONS  (PRN):  acetaminophen     Tablet .. 650 milliGRAM(s) Oral every 6 hours PRN Temp greater or equal to 38C (100.4F), Moderate Pain (4 - 6)  ondansetron Injectable 4 milliGRAM(s) IV Push every 6 hours PRN Nausea and/or Vomiting      Allergies: No Known Allergies    Review of Systems:  UNABLE TO OBTAIN     PHYSICAL EXAM:  VITALS: T(C): 37.3 (12-04-24 @ 12:00)  T(F): 99.2 (12-04-24 @ 12:00), Max: 99.2 (12-04-24 @ 12:00)  HR: 129 (12-04-24 @ 14:00) (120 - 129)  BP: 112/56 (12-04-24 @ 14:00) (106/60 - 146/65)  RR:  (17 - 28)  SpO2:  (99% - 100%)  Wt(kg): --  GENERAL: NAD, well-groomed, well-developed  RESPIRATORY: No labored breathing   GI: Soft, nontender, non distended  PSYCH: Alert and oriented x 2      CAPILLARY BLOOD GLUCOSE  POCT Blood Glucose.: 153 mg/dL (04 Dec 2024 16:06)  POCT Blood Glucose.: 152 mg/dL (04 Dec 2024 14:20)  POCT Blood Glucose.: 140 mg/dL (04 Dec 2024 13:43)  POCT Blood Glucose.: 104 mg/dL (04 Dec 2024 13:09)  POCT Blood Glucose.: 120 mg/dL (04 Dec 2024 12:07)  POCT Blood Glucose.: 122 mg/dL (04 Dec 2024 11:01)  POCT Blood Glucose.: 128 mg/dL (04 Dec 2024 10:06)  POCT Blood Glucose.: 147 mg/dL (04 Dec 2024 09:03)  POCT Blood Glucose.: 138 mg/dL (04 Dec 2024 07:54)  POCT Blood Glucose.: 133 mg/dL (04 Dec 2024 06:51)  POCT Blood Glucose.: 137 mg/dL (04 Dec 2024 06:04)  POCT Blood Glucose.: 139 mg/dL (04 Dec 2024 05:05)  POCT Blood Glucose.: 147 mg/dL (04 Dec 2024 04:06)  POCT Blood Glucose.: 161 mg/dL (04 Dec 2024 03:00)  POCT Blood Glucose.: 174 mg/dL (04 Dec 2024 02:02)  POCT Blood Glucose.: 173 mg/dL (04 Dec 2024 01:02)  POCT Blood Glucose.: 188 mg/dL (04 Dec 2024 00:06)  POCT Blood Glucose.: 183 mg/dL (03 Dec 2024 23:01)  POCT Blood Glucose.: 190 mg/dL (03 Dec 2024 22:05)  POCT Blood Glucose.: 177 mg/dL (03 Dec 2024 21:04)  POCT Blood Glucose.: 196 mg/dL (03 Dec 2024 20:01)  POCT Blood Glucose.: 196 mg/dL (03 Dec 2024 18:55)  POCT Blood Glucose.: 208 mg/dL (03 Dec 2024 18:05)  POCT Blood Glucose.: 206 mg/dL (03 Dec 2024 17:10)    A1C with Estimated Average Glucose (12.03.24 @ 00:49)    A1C with Estimated Average Glucose Result: 10.6   Estimated Average Glucose: 258      12-04    151[H]  |  119[H]  |  2[L]  ----------------------------<  151[H]  5.1   |  17[L]  |  0.40[L]    eGFR: 115    Ca    7.3[L]      12-04  Mg     2.50     12-04  Phos  3.0     12-04    TPro  5.1[L]  /  Alb  2.1[L]  /  TBili  0.8  /  DBili  x   /  AST  38[H]  /  ALT  21  /  AlkPhos  80  12-04    Thyroid Function Tests:  12-03 @ 00:49 TSH 1.74 FreeT4 -- T3 -- Anti TPO -- Anti Thyroglobulin Ab -- TSI --    Diet, NPO:      Special Instructions for Nursing:  CODE STROKE; NPO until Dysphagia screen or Speech Bedside Swallow Evaluation completed and passed (12-04-24 @ 15:02) [Active]

## 2024-12-04 NOTE — DIETITIAN INITIAL EVALUATION ADULT - NSFNSGIIOFT_GEN_A_CORE
12-03-24 @ 07:01  -  12-04-24 @ 07:00  --------------------------------------------------------  OUT:    Rectal Tube (mL): 650 mL  Total OUT: 650 mL    Total NET: -650 mL             +Diarrhea.

## 2024-12-04 NOTE — CONSULT NOTE ADULT - ASSESSMENT
Impression:        Recommendations:    -  -  -  -    Case to be discussed with stroke fellow  MARY GIBSON is a 59y (1965) woman with a PMHx significant for T2DM, HTN, T cell lymphoma (Last chemo on 11/25 with Brentuximab/Doxorubicin/Cytoxan/onpro) who is admitted for DKA and found to be septic (E coli bacteremia). Code stroke called today for altered mental status, concern for possible intracranial bleed and facial twitching. Patient was admitted on 12/2, unknown mental state as baseline but she was reportedly A&Ox3 and confused. Per MICU team notes, mental status was slowly improving. Around 2:44pm code stroke called as she was found to not be following commands and appeared agitated which was drastic change from 12PM. Per staff she was noted to have facial twitching as well. Of note, patient has pancytopenia - however platelets count dropped to 8 this morning.      LKW: 12pm  NIHSS: 11  pre MRS: 0  Not a Teneceteplase candidate due to thrombocytopenia   Not a thrombectomy candidate due to low concern for LVO     Impression:  Likely toxic metabolic septic encephalopathy in setting of DKA and E. coli bacteremia. Given rapid decline in mental status and noted facial twitching, will evaluate for possible seizure       Recommendations:    - Continuous video EEG and monitor off anti-seizure medications for now   - MR brain w and wo ashley when medically able   - Address metabolic and infectious abnormalities as you are doing   - Neurochecks and vitals q4   - Maintain seizure, aspiration, fall precautions   - DVT: SCD for now, chemical DVT ppx when appropriate       Discussed with stroke fellow Dr. Maldonado under supervision of attending Dr. Keen regarding decision against candidacy for Tenecteplase / thrombectomy. Patient seen and discussed with Neurology attending Dr. Sams, note finalized upon attending attestation.

## 2024-12-04 NOTE — PROGRESS NOTE ADULT - ATTENDING COMMENTS
59 F with T cell lymphoma on chemo m, htn, DM here with sepsis due to e coli bacteremia and found to have HAGMA/LOUISA/lactic acidosis due to DKA    on insulin gtt, q1h finger sticks  c/w IVF, will give another fluid bolus  monitor for hypokalemia and hypophosphatemia   unclear source of bacteremia, c/w cefepime and wait for sensitivities, repeat cultures negative  suspect acute hypoxemic respiratory failure due to sepsis due to e coli bacteremia, wean as tolerated  suspect acute metabolic encephalopathy due to DKA, sepsis, slowly improving as patient more alert this AM than she was yesterday  f/u heme/onc reccs, got neulasta on 11/25 so will wait for that  f/u endocrine 59 F with T cell lymphoma on chemo m, htn, DM here with sepsis due to e coli bacteremia and found to have HAGMA/LOUISA/lactic acidosis due to DKA    on insulin gtt, q1h finger sticks  c/w IVF, will give another fluid bolus  monitor for hypokalemia and hypophosphatemia   unclear source of bacteremia, c/w cefepime and wait for sensitivities, repeat cultures negative  suspect acute hypoxemic respiratory failure due to sepsis due to e coli bacteremia, wean as tolerated  suspect acute metabolic encephalopathy due to DKA, sepsis, slowly improving as patient more alert this AM than she was yesterday  f/u heme/onc reccs, got neulasta on 11/25 so will wait for that  f/u endocrine    Update as of 6 pm:  Around 230 pm, patient noted to have change in mental status, moaning more.  There was concern she had L hand weakness as well as focal twitching of left face, but was also complaining of L facial/upper shoulder pain.  Hemodynamically stable other than tachycardia and tachypnea.  On exam, she was able ot move all her extremities and asked team to stop moving her when stimulated.  Labs showed worsening lactic acidosis.  Given concern for stroke with low plt, stat CT head/neck/abd pelvis done.  No intracranial bleed.  CT neck shows possible abscess and may be source of bacteremia.  Plan to call ENT/surgery/IR for urgent drainage, serial labs, high risk for needing intubation.  c/w insulin gtt for now.

## 2024-12-04 NOTE — CONSULT NOTE ADULT - SUBJECTIVE AND OBJECTIVE BOX
Neurology - Consult Note    -  Spectra: [phone] ([facility]), [phone] ([facility])  -    HPI: Patient MARY GIBSON is a 59y (1965) woman with a PMHx significant for ***      Review of Systems:    All other review of systems is negative unless indicated above.    Allergies:  No Known Allergies      PMHx/PSHx/Family Hx: As above, otherwise see below   Tonsillar mass    Tongue lesion    Benign essential HTN        Social Hx:      Medications:  MEDICATIONS  (STANDING):  cefTRIAXone   IVPB 2000 milliGRAM(s) IV Intermittent every 24 hours  chlorhexidine 2% Cloths 1 Application(s) Topical <User Schedule>  insulin regular Infusion 1 Unit(s)/Hr (1 mL/Hr) IV Continuous <Continuous>  lactated ringers 1000 milliLiter(s) (150 mL/Hr) IV Continuous <Continuous>    MEDICATIONS  (PRN):  acetaminophen     Tablet .. 650 milliGRAM(s) Oral every 6 hours PRN Temp greater or equal to 38C (100.4F), Moderate Pain (4 - 6)  ondansetron Injectable 4 milliGRAM(s) IV Push every 6 hours PRN Nausea and/or Vomiting      Vitals:  T(C): 37.3 (12-04-24 @ 12:00), Max: 37.7 (12-03-24 @ 16:00)  HR: 129 (12-04-24 @ 14:00) (120 - 129)  BP: 112/56 (12-04-24 @ 14:00) (106/60 - 146/65)  RR: 28 (12-04-24 @ 14:00) (17 - 28)  SpO2: 100% (12-04-24 @ 14:00) (99% - 100%)    Physical Examination:  INCOMPLETE  General - Lying in bed, agitated   Eyes - Normal sclera, conjugate gaze     Neurologic Exam:  Mental status - Awake, Alert, Oriented to person, place, and time. Speech fluent, repetition and naming intact. Follows simple and complex commands. Attention/concentration, recent and remote memory (including registration and recall), and fund of knowledge intact    Cranial nerves - PERRLA, VFF, EOMI, face sensation (V1-V3) intact b/l, facial strength intact without asymmetry b/l, hearing intact b/l, palate with symmetric elevation, trapezius OR sternocleidomastiod 5/5 strength b/l, tongue midline on protrusion with full lateral movement    Motor - Normal bulk and tone throughout. No pronator drift.  Strength testing            Deltoid      Biceps      Triceps     Wrist Extension    Wrist Flexion     Interossei         R            5                 5               5                     5                              5                        5                 5  L             5                 5               5                     5                              5                        5                 5              Hip Flexion    Hip Extension    Knee Flexion    Knee Extension    Dorsiflexion    Plantar Flexion  R              5                           5                       5                           5                            5                          5  L              5                           5                        5                           5                            5                          5    Sensation - Light touch/temperature OR pain/vibration intact throughout    DTR's -             Biceps      Triceps     Brachioradialis      Patellar    Ankle    Toes/plantar response  R             2+             2+                  2+                       2+            2+                 Down  L              2+             2+                 2+                        2+           2+                 Down    Coordination - Finger to Nose intact b/l. No tremors appreciated    Gait and station - Normal casual gait. Romberg (-)    Labs:                        7.1    0.16  )-----------( 8        ( 04 Dec 2024 07:54 )             19.5     12-04    151[H]  |  121[H]  |  3[L]  ----------------------------<  111[H]  4.3   |  17[L]  |  0.37[L]    Ca    7.5[L]      04 Dec 2024 12:06  Phos  2.4     12-04  Mg     1.90     12-04    TPro  5.0[L]  /  Alb  2.0[L]  /  TBili  0.6  /  DBili  x   /  AST  29  /  ALT  20  /  AlkPhos  77  12-04    CAPILLARY BLOOD GLUCOSE      POCT Blood Glucose.: 152 mg/dL (04 Dec 2024 14:20)    LIVER FUNCTIONS - ( 04 Dec 2024 12:06 )  Alb: 2.0 g/dL / Pro: 5.0 g/dL / ALK PHOS: 77 U/L / ALT: 20 U/L / AST: 29 U/L / GGT: x             Culture - Blood (collected 02 Dec 2024 21:54)  Source: .Blood BLOOD  Preliminary Report (04 Dec 2024 01:02):    No growth at 24 hours    Culture - Urine (collected 02 Dec 2024 15:30)  Source: Clean Catch Clean Catch (Midstream)  Final Report (03 Dec 2024 11:54):    <10,000 CFU/mL Normal Urogenital Myesha    Culture - Blood (collected 02 Dec 2024 15:10)  Source: .Blood BLOOD  Gram Stain (03 Dec 2024 09:08):    Growth in aerobic bottle: Gram Negative Rods    Growth in anaerobic bottle: Gram Negative Rods  Final Report (04 Dec 2024 10:59):    Growth in aerobic and anaerobic bottles: Escherichia coli    See previous culture [id number]    Culture - Blood (collected 02 Dec 2024 14:50)  Source: .Blood BLOOD  Gram Stain (03 Dec 2024 08:00):    Growth in aerobic bottle: Gram Negative Rods    Growth in anaerobic bottle: Gram Negative Rods  Final Report (04 Dec 2024 10:58):    Growth in aerobic and anaerobic bottles: Escherichia coli    Direct identification is available within approximately 3-5    hours either by Blood Panel Multiplexed PCR or Direct    MALDI-TOF. Details: [email or web address]  Organism: Blood Culture PCR  Escherichia coli (04 Dec 2024 10:58)  Organism: Escherichia coli (04 Dec 2024 10:58)  Organism: Blood Culture PCR (04 Dec 2024 10:58)      PT/INR - ( 04 Dec 2024 00:35 )   PT: 13.2 sec;   INR: 1.11 ratio         PTT - ( 04 Dec 2024 00:35 )  PTT:24.7 sec          Radiology:       Neurology - Consult Note    -  Spectra: [phone] ([facility]), [phone] ([facility])  -    HPI: Patient MARY GIBSON is a 59y (1965) woman with a PMHx significant for T2DM, HTN, T cell lymphoma (Last chemo on 11/25 with Brentuximab/Doxorubicin/Cytoxan/onpro) who is admitted for DKA and found to be septic (E coli bacteremia). Code stroke called today for altered mental status, concern for possible intracranial bleed and facial twitching. Patient was admitted on 12/2, unknown mental state as baseline but she was reportedly A&Ox3 and confused. Per MICU team notes, mental status was slowly improving. Around 2:44pm code stroke called as she was found to not be following commands and appeared agitated which was drastic change from 12PM. Per staff she was noted to have facial twitching as well. Of note, patient has pancytopenia - however platelets count dropped to 8 this morning.  Patient not a candidate for tenecteplase due to thrombocytopenia.     LKW: 12pm  NIHSS: 11  pre MRS: 0    Review of Systems:    All other review of systems is negative unless indicated above.    Allergies:  No Known Allergies      PMHx/PSHx/Family Hx: As above, otherwise see below   Tonsillar mass  Tongue lesion  Benign essential HTN      Social Hx:      Medications:  MEDICATIONS  (STANDING):  cefTRIAXone   IVPB 2000 milliGRAM(s) IV Intermittent every 24 hours  chlorhexidine 2% Cloths 1 Application(s) Topical <User Schedule>  insulin regular Infusion 1 Unit(s)/Hr (1 mL/Hr) IV Continuous <Continuous>  lactated ringers 1000 milliLiter(s) (150 mL/Hr) IV Continuous <Continuous>    MEDICATIONS  (PRN):  acetaminophen     Tablet .. 650 milliGRAM(s) Oral every 6 hours PRN Temp greater or equal to 38C (100.4F), Moderate Pain (4 - 6)  ondansetron Injectable 4 milliGRAM(s) IV Push every 6 hours PRN Nausea and/or Vomiting      Vitals:  T(C): 37.3 (12-04-24 @ 12:00), Max: 37.7 (12-03-24 @ 16:00)  HR: 129 (12-04-24 @ 14:00) (120 - 129)  BP: 112/56 (12-04-24 @ 14:00) (106/60 - 146/65)  RR: 28 (12-04-24 @ 14:00) (17 - 28)  SpO2: 100% (12-04-24 @ 14:00) (99% - 100%)    Physical Examination:    General - Lying in bed, agitated. Intermittent B/L facial tremor noted. Also noted with intermittent hand tremor   Eyes - Normal sclera, conjugate gaze     Neurologic Exam:  Mental status - Awake, attends to examiner and tracks eyes to voice. Does not follow commands. Verbal output "stop" only to noxious stimuli     Cranial nerves - PERRL 4mm >2mm B/L, blink to threat B/L, full lateral gaze B/L and crosses midline, facial strength intact without asymmetry b/l    Motor - Normal bulk and tone throughout.   Strength testing- Spontaneously moves all extremities.  B/L UE are antigravity to noxious stimuli      Sensation - Withdraws to noxious stimuli     DTR's - diffusely 1+ throughout, limited by mental status     Coordination - Unable to assess    Gait and station - Unable to assess     Labs:                        7.1    0.16  )-----------( 8        ( 04 Dec 2024 07:54 )             19.5     12-04    151[H]  |  121[H]  |  3[L]  ----------------------------<  111[H]  4.3   |  17[L]  |  0.37[L]    Ca    7.5[L]      04 Dec 2024 12:06  Phos  2.4     12-04  Mg     1.90     12-04    TPro  5.0[L]  /  Alb  2.0[L]  /  TBili  0.6  /  DBili  x   /  AST  29  /  ALT  20  /  AlkPhos  77  12-04    CAPILLARY BLOOD GLUCOSE      POCT Blood Glucose.: 152 mg/dL (04 Dec 2024 14:20)    LIVER FUNCTIONS - ( 04 Dec 2024 12:06 )  Alb: 2.0 g/dL / Pro: 5.0 g/dL / ALK PHOS: 77 U/L / ALT: 20 U/L / AST: 29 U/L / GGT: x             Culture - Blood (collected 02 Dec 2024 21:54)  Source: .Blood BLOOD  Preliminary Report (04 Dec 2024 01:02):    No growth at 24 hours    Culture - Urine (collected 02 Dec 2024 15:30)  Source: Clean Catch Clean Catch (Midstream)  Final Report (03 Dec 2024 11:54):    <10,000 CFU/mL Normal Urogenital Myesha    Culture - Blood (collected 02 Dec 2024 15:10)  Source: .Blood BLOOD  Gram Stain (03 Dec 2024 09:08):    Growth in aerobic bottle: Gram Negative Rods    Growth in anaerobic bottle: Gram Negative Rods  Final Report (04 Dec 2024 10:59):    Growth in aerobic and anaerobic bottles: Escherichia coli    See previous culture [id number]    Culture - Blood (collected 02 Dec 2024 14:50)  Source: .Blood BLOOD  Gram Stain (03 Dec 2024 08:00):    Growth in aerobic bottle: Gram Negative Rods    Growth in anaerobic bottle: Gram Negative Rods  Final Report (04 Dec 2024 10:58):    Growth in aerobic and anaerobic bottles: Escherichia coli    Direct identification is available within approximately 3-5    hours either by Blood Panel Multiplexed PCR or Direct    MALDI-TOF. Details: [email or web address]  Organism: Blood Culture PCR  Escherichia coli (04 Dec 2024 10:58)  Organism: Escherichia coli (04 Dec 2024 10:58)  Organism: Blood Culture PCR (04 Dec 2024 10:58)      PT/INR - ( 04 Dec 2024 00:35 )   PT: 13.2 sec;   INR: 1.11 ratio         PTT - ( 04 Dec 2024 00:35 )  PTT:24.7 sec      Radiology:  CT Brain Stroke Protocol (12.04.24 @ 15:25)   IMPRESSION: No acute intracranial hemorrhage, mass effect, or shift of   the midline structures.    Partial visualization of a large soft tissue mass within the left neck   adjacent to the parotid gland extending inferiorly off the field-of-view.   This is better evaluated on the prior contrast-enhanced neck CT study   from 10/25/2024.

## 2024-12-05 NOTE — PROGRESS NOTE ADULT - SUBJECTIVE AND OBJECTIVE BOX
Chief Complaint: Type 2 DM    History: Patient in MICU, currently intubated s/p resp distress overnight, sedated  on insulin drip currently 1 unit/hour    MEDICATIONS  (STANDING):  acetaminophen   IVPB .. 1000 milliGRAM(s) IV Intermittent once  chlorhexidine 0.12% Liquid 15 milliLiter(s) Oral Mucosa every 12 hours  chlorhexidine 2% Cloths 1 Application(s) Topical <User Schedule>  dextrose 5%. 1000 milliLiter(s) (50 mL/Hr) IV Continuous <Continuous>  dextrose 5%. 1000 milliLiter(s) (100 mL/Hr) IV Continuous <Continuous>  dextrose 50% Injectable 25 Gram(s) IV Push once  dextrose 50% Injectable 12.5 Gram(s) IV Push once  dextrose 50% Injectable 25 Gram(s) IV Push once  dextrose Oral Gel 15 Gram(s) Oral once  fentaNYL   Infusion. 0.5 MICROgram(s)/kG/Hr (4.79 mL/Hr) IV Continuous <Continuous>  glucagon  Injectable 1 milliGRAM(s) IntraMuscular once  insulin lispro (ADMELOG) corrective regimen sliding scale   SubCutaneous every 6 hours  insulin NPH human recombinant 10 Unit(s) SubCutaneous every 6 hours  insulin regular Infusion 3 Unit(s)/Hr (3 mL/Hr) IV Continuous <Continuous>  magnesium sulfate  IVPB 2 Gram(s) IV Intermittent every 2 hours  norepinephrine Infusion 0.05 MICROgram(s)/kG/Min (4.49 mL/Hr) IV Continuous <Continuous>  petrolatum Ophthalmic Ointment 1 Application(s) Both EYES two times a day  phenylephrine    Infusion 0.5 MICROgram(s)/kG/Min (8.98 mL/Hr) IV Continuous <Continuous>  piperacillin/tazobactam IVPB.. 3.375 Gram(s) IV Intermittent every 8 hours  propofol Infusion 20.007 MICROgram(s)/kG/Min (11.5 mL/Hr) IV Continuous <Continuous>  senna 2 Tablet(s) Oral at bedtime  sodium chloride 0.45%. 1000 milliLiter(s) (125 mL/Hr) IV Continuous <Continuous>  vasopressin Infusion 0.04 Unit(s)/Min (6 mL/Hr) IV Continuous <Continuous>    MEDICATIONS  (PRN):      Allergies    No Known Allergies    Intolerances      Review of Systems:  Constitutional: No fever  Eyes: No blurry vision  Neuro: No tremors  HEENT: No pain  Cardiovascular: No chest pain, palpitations  Respiratory: No SOB, no cough  GI: No nausea, vomiting, abdominal pain  : No dysuria  Skin: no rash  Psych: no depression  Endocrine: no polyuria, polydipsia  Hem/lymph: no swelling  Osteoporosis: no fractures    ALL OTHER SYSTEMS REVIEWED AND NEGATIVE    UNABLE TO OBTAIN    PHYSICAL EXAM:  VITALS: T(C): 37.2 (12-05-24 @ 04:00)  T(F): 99 (12-05-24 @ 04:00), Max: 99 (12-05-24 @ 04:00)  HR: 117 (12-05-24 @ 11:40) (107 - 141)  BP: 116/91 (12-05-24 @ 05:45) (76/39 - 205/155)  RR:  (12 - 33)  SpO2:  (90% - 100%)  Wt(kg): --  GENERAL: sedated unresponsive  EYES: eyes closed, no proptosis  HEENT:  ETT in place  RESPIRATORY: on vent, nonlabored respirations    CAPILLARY BLOOD GLUCOSE      POCT Blood Glucose.: 124 mg/dL (05 Dec 2024 11:52)  POCT Blood Glucose.: 134 mg/dL (05 Dec 2024 11:07)  POCT Blood Glucose.: 149 mg/dL (05 Dec 2024 10:10)  POCT Blood Glucose.: 162 mg/dL (05 Dec 2024 09:20)  POCT Blood Glucose.: 187 mg/dL (05 Dec 2024 07:54)  POCT Blood Glucose.: 186 mg/dL (05 Dec 2024 07:06)  POCT Blood Glucose.: 190 mg/dL (05 Dec 2024 06:07)  POCT Blood Glucose.: 220 mg/dL (05 Dec 2024 04:31)  POCT Blood Glucose.: 151 mg/dL (05 Dec 2024 03:44)  POCT Blood Glucose.: 228 mg/dL (05 Dec 2024 02:41)  POCT Blood Glucose.: 179 mg/dL (05 Dec 2024 01:22)  POCT Blood Glucose.: 213 mg/dL (05 Dec 2024 00:15)  POCT Blood Glucose.: 194 mg/dL (04 Dec 2024 23:09)  POCT Blood Glucose.: 198 mg/dL (04 Dec 2024 22:03)  POCT Blood Glucose.: 183 mg/dL (04 Dec 2024 21:05)  POCT Blood Glucose.: 172 mg/dL (04 Dec 2024 20:05)  POCT Blood Glucose.: 159 mg/dL (04 Dec 2024 19:05)  POCT Blood Glucose.: 155 mg/dL (04 Dec 2024 18:03)  POCT Blood Glucose.: 161 mg/dL (04 Dec 2024 17:03)  POCT Blood Glucose.: 153 mg/dL (04 Dec 2024 16:06)  POCT Blood Glucose.: 152 mg/dL (04 Dec 2024 14:20)  POCT Blood Glucose.: 140 mg/dL (04 Dec 2024 13:43)  POCT Blood Glucose.: 104 mg/dL (04 Dec 2024 13:09)      12-05    151[H]  |  118[H]  |  2[L]  ----------------------------<  207[H]  5.3   |  14[L]  |  0.58    eGFR: 105    Ca    6.6[L]      12-05  Mg     1.70     12-05  Phos  3.8     12-05    TPro  4.6[L]  /  Alb  1.8[L]  /  TBili  0.7  /  DBili  x   /  AST  46[H]  /  ALT  25  /  AlkPhos  84  12-05      A1C with Estimated Average Glucose Result: 10.6 % (12-03-24 @ 00:49)  A1C with Estimated Average Glucose Result: 12.2 % (12-02-24 @ 14:50)  A1C with Estimated Average Glucose Result: 11.9 % (11-02-24 @ 16:47)      Thyroid Function Tests:  12-03 @ 00:49 TSH 1.74 FreeT4 -- T3 -- Anti TPO -- Anti Thyroglobulin Ab -- TSI --

## 2024-12-05 NOTE — PROCEDURE NOTE - PROCEDURE DATE TIME, MLM
03-Dec-2024 10:45
05-Dec-2024 01:45
02-Dec-2024 22:00
03-Dec-2024 13:25
05-Dec-2024 06:17
02-Dec-2024 21:30
05-Dec-2024 05:50

## 2024-12-05 NOTE — CONSULT NOTE ADULT - CONSULT REQUESTED DATE/TIME
03-Dec-2024 11:00
04-Dec-2024 15:05
05-Dec-2024 00:00
03-Dec-2024 12:47
05-Dec-2024 00:22
05-Dec-2024 10:11

## 2024-12-05 NOTE — CONSULT NOTE ADULT - REASON FOR ADMISSION
DKA, neutropenic fever

## 2024-12-05 NOTE — PROGRESS NOTE ADULT - SUBJECTIVE AND OBJECTIVE BOX
OTOLARYNGOLOGY (ENT) PROGRESS NOTE    PATIENT: SENIA GIBSON  MRN: [id number]  : 67  [id number]  DATE OF SERVICE:  24  			             CT scan reviewed with Dr. Rebolledo. Large neck collection on the left side consistent with conglomeration of necrotic lymph nodes with possible suprainfection. Previous imaging from October with neck node conglomeration smaller in size. Avoiding incision and drainage given conglomeration primarily necrotic neoplasm w/ possible suprainfection and patient at very high risk for chronic draining neck collection if incision made. Patient unstable, on multiple pressors with pressor requirements increasing this morning. I discussed with MICU team and they felt needle aspiration warranted and patient stable enough to proceed. Phone consent obtained from daughter, Julieta Gibson for needle aspiration. I discussed risks, including bleeding given low platelet count, drainage from needle aspiration site. I also discussed that patient remains very unstable and while needle aspiration will decrease neck collection size, which could potentially be source of infection and provide culture results, Senia Gibson will continue to require IV antibiotics and medical should continue to look for additional infectious sources.     ICU Vital Signs Last 24 Hrs  T(C): 37.2 (05 Dec 2024 04:00), Max: 37.2 (05 Dec 2024 04:00)  T(F): 99 (05 Dec 2024 04:00), Max: 99 (05 Dec 2024 04:00)  HR: 117 (05 Dec 2024 11:40) (107 - 141)  BP: 116/91 (05 Dec 2024 05:45) (76/39 - 205/155)  BP(mean): 101 (05 Dec 2024 05:45) (53 - 171)  ABP: 104/50 (05 Dec 2024 11:40) (76/42 - 207/112)  ABP(mean): 66 (05 Dec 2024 11:40) (54 - 148)  RR: 28 (05 Dec 2024 11:40) (12 - 33)  SpO2: 98% (05 Dec 2024 11:40) (90% - 100%)    O2 Parameters below as of 05 Dec 2024 06:00  Patient On (Oxygen Delivery Method): ventilator    O2 Concentration (%): 30        Procedure:  An 18g needle was used to aspirate 25cc of fluid thick debris, possibly purulent vs. necrotic debris, w/o foul smell from left neck, sent for culture.   Pressure held and bleeding from needle site resolved. Gauze and tape dressing applied over left neck. Procedure uncomplicated.             Assessment and Plan:  SENIA GIBSON is a  59yFemale w PMH DMT2 on trulicity/metformin, HTN on amlodipine, and T cell lymphoma on brentuximab/doxorubicin/cytoxan/onpro last chemo  who presented to ED with progressive SOB, weakness x1 week and poor PO intake, found to be septic with E coli bacteremia of unknown source. ENT consulted to eval for left neck mass as source for septicemia in the absence of other sources. CT neck reveals large fluid collection/necrotic jayla mass in the left neck in the region of previously demonstrated necrotic lymph nodes, now with increase in size of fluid component measuring 9.9 x 6.9 x 6.1 cm with thick nodular rim of enhancement in some areas. Findings c/w superinfected necrotic lymph node mass, now s/p needle aspiration with 25cc thick debris obtained .    - c/w IV abx   - follow up cultures  - remainder of management per MICU      Page ENT with any questions/concerns.  Peds Page #[id number]  Adult Page #[phone]    Jen Alonso  24 @ 12:26

## 2024-12-05 NOTE — CONSULT NOTE ADULT - SUBJECTIVE AND OBJECTIVE BOX
Interventional Radiology    Evaluate for Procedure: neck abscess drainage    HPI: 59y Female with lymphoma, admitted to MICU for sepsis. CT shows left large necrotic lymph node with concern for superinfection. IR consulted for drainage.    Allergies: No Known Allergies    Medications (Abx/Cardiac/Anticoagulation/Blood Products)    cefepime   IVPB: 100 mL/Hr IV Intermittent (12-04 @ 05:24)  cefTRIAXone   IVPB: 100 mL/Hr IV Intermittent (12-04 @ 14:36)  norepinephrine Infusion: 4.49 mL/Hr IV Continuous (12-05 @ 05:13)  piperacillin/tazobactam IVPB.: 200 mL/Hr IV Intermittent (12-04 @ 21:59)  piperacillin/tazobactam IVPB..: 25 mL/Hr IV Intermittent (12-05 @ 09:11)  vancomycin  IVPB: 250 mL/Hr IV Intermittent (12-04 @ 23:13)    Data:    T(C): 37.2  HR: 117  BP: 116/91  RR: 28  SpO2: 98%    -WBC 0.32 / HgB 8.2 / Hct 22.2 / Plt 11  -Na 151 / Cl 118 / BUN 2 / Glucose 207  -K 5.3 / CO2 14 / Cr 0.58  -ALT 25 / Alk Phos 84 / T.Bili 0.7  -INR 1.23 / PTT 32.8    Radiology: CT reviewed    Assessment/Plan:   -59y Female with lymphoma, admitted to MICU for sepsis. CT shows left large necrotic lymph node with concern for superinfection. IR consulted for drainage.        - case reviewed with attending Dr. Duong. Mass not amenable to drain placement, but if needed can be aspirated for cultures.   - Recommend discussing with ENT if a bedside aspiration can be done  - Please reach out to IR if needed   Interventional Radiology    Evaluate for Procedure: neck abscess drainage    HPI: 59y Female with lymphoma, admitted to MICU for sepsis. CT shows left large necrotic lymph node with concern for superinfection. IR consulted for drainage.    Allergies: No Known Allergies    Medications (Abx/Cardiac/Anticoagulation/Blood Products)    cefepime   IVPB: 100 mL/Hr IV Intermittent (12-04 @ 05:24)  cefTRIAXone   IVPB: 100 mL/Hr IV Intermittent (12-04 @ 14:36)  norepinephrine Infusion: 4.49 mL/Hr IV Continuous (12-05 @ 05:13)  piperacillin/tazobactam IVPB.: 200 mL/Hr IV Intermittent (12-04 @ 21:59)  piperacillin/tazobactam IVPB..: 25 mL/Hr IV Intermittent (12-05 @ 09:11)  vancomycin  IVPB: 250 mL/Hr IV Intermittent (12-04 @ 23:13)    Data:    T(C): 37.2  HR: 117  BP: 116/91  RR: 28  SpO2: 98%    -WBC 0.32 / HgB 8.2 / Hct 22.2 / Plt 11  -Na 151 / Cl 118 / BUN 2 / Glucose 207  -K 5.3 / CO2 14 / Cr 0.58  -ALT 25 / Alk Phos 84 / T.Bili 0.7  -INR 1.23 / PTT 32.8    Radiology: CT reviewed    Assessment/Plan:   -59y Female with lymphoma, admitted to MICU for sepsis. CT shows left large necrotic lymph node with concern for superinfection. IR consulted for drainage.        - case reviewed with attending Dr. Duong. Mass not amenable to drain placement, but if needed can be aspirated for cultures.   - Recommend discussing with ENT if a bedside aspiration can be done  - Please reconsult IR if needed

## 2024-12-05 NOTE — CONSULT NOTE ADULT - ATTENDING COMMENTS
Ms. Martinez is a 58 yo woman with probable toxic metabolic septic encephalopathy.    There was a rapid change in her mental status per MICU team and there are some abnormal movements.  Therefore, we will exclude seizures.   I agree with work up and management as above.   D/W MICU team.  Thank you.     There is a high probability of sudden, clinically significant, or life threatening deterioration in the patient’s condition which requires the highest level of physician preparedness to intervene urgently.  Critical care time:  40 minutes.
59y Female with DMT2 on trulicity/metformin, HTN on amlodipine, and lymphoma on chemo presents to ED with progressive SOB and weakness for 1 week and no PO intake in the last day.     Assessment:    -Patient was admitted at [facility] from 10/25-10/29/24 during which she was diagnosed with a tonsillar mass with lymphadenopathy, concerning for T-Cell lymphoma, and new HTN. She was readmitted to [facility] from 11/1-11/7/24 where she was initiated on chemotherapy and allopurinol. She was also found to have new DMT2 and was started on trulicity and metformin. Last chemo on 11/25 with Brentuximab/Doxorubicin/Cytoxan/onpro.  -Pt admitted 12/2 for sepsis and HAGMA/LOUISA/lactic acidosis due to DKA  -CT neck 12/4 shows large fluid collection/necrotic jayla mass in the left neck in the region of previously demonstrated necrotic lymph nodes, now with increase in size of fluid component measuring 9.9 x 6.9 x 6.1 cm with thick nodular rim of enhancement in some areas. Findings c/w superinfected necrotic lymph node mass.   -CT A/P 12/4 Mild perivesicular fat stranding, which may be seen in the setting of cystitis. Fatty infiltration of the liver. Mild thickening of the endometrial complex, which measures 12 mm.   -GI PCR neg  -Pt now on Vanc and Zosyn    #Bacteremia  #Necrotic jayla mass with superinfection  #Septic shock, fever, tachycardia, neutropenia, hypotension, on pressors    Recommendation:  -Unclear if bacteremia 2/2 infected neck mass vs GI source (pt having diarrhea)   -can d/c Vanc  - can transition to ceftriaxone and flagyl  -Now s/p needle aspiration with 25cc thick debris obtained 12/5 with ENT. F/U CX  -Cont to monitor fever and WBC curve   -recheck blood cx  -f/u sensitivities   -check strongy ab.     Plan discussed with MICU.     Ana Kirkpatrick  Please contact through MS Teams   If no response or past 5 pm/weekend call [phone].
59y Female with DMT2 on trulicity/metformin, HTN on amlodipine, and recently diagnosed T cell lymphoma (dx 10/25/24) on BVCHP presents to ED with progressive SOB and weakness for 1 week and no PO intake in the last day. Patient also endorses black stool. Last chemotherapy C2D1 [facility]P was on 11/25/24 with  Brentuximab/Doxorubicin/Cytoxan/onpro. Pt was found to have DKA and E.Coli Bacteremia.      # T Cell Lymphoma  # Neutropenic Fever  # E. Coli Bacteremia  - Dx T cell lymphoma on 10/25/24 from left tonsillar mass biopsy. Initially got cytoxan+adriamycin on 11/2/24. After tumor board discussion, pt was switched to BVCHP regimen and received C1D1 on 11/6/24. C2D1 [facility]P was on 11/25/24 with Neulasta.  - as patient got Neulasta (peg-Filgrastrim) on 11/25/24 it can take upto two weeks to see increase in the WBC count. At this time, we will not give any futher GCSF support.  - continue with broad spectrum antibiotics  - f/u BCx, stool culture, GI PCR  - DKA managements as per primary team  - Transfuse to keep Hgb>7. Transfuse platelet if count <10k,  bleeding and count <50k

## 2024-12-05 NOTE — PROGRESS NOTE ADULT - ASSESSMENT
59F with DMT2 on trulicity/metformin, HTN on amlodipine, and recently diagnosed T cell lymphoma (dx 10/25/24) on [facility]P presents to ED with progressive SOB and weakness for 1 week, found to have DKA and E.Coli Bacteremia. Hematology consulted for T Cell Lymphoma.       # T Cell Lymphoma  # Neutropenic Fever  # E. Coli Bacteremia  # DKA  # Dark Stool  *GI PCR -ve  - Dx T cell lymphoma on 10/25/24 from left tonsillar mass biopsy. Initially got cytoxan+adriamycin on 11/2/24. After tumor board discussion, pt was switched to [facility]CHP regimen and received C1D1 on 11/6/24. C2D1 [facility]P was on 11/25/24 with Neulasta.  - [facility] hospitalization complicated by tachypnea requiring intubation and MICU admission  - CT Neck (12/4) showed previously demonstrated necrotic jayla mass in the left neck with increased fluid component and thick nodular rim of enhancement compared to prior exam on 10/25/2024. Overlying soft tissue edema and skin   thickening suggestive of superinfection of necrotic contents with suppuration/abscess formation.  - s/p needle aspiration with 25cc thick debris obtained 12/5 by ENT    Recommend:  - as patient got Neulasta (peg-Filgrastrim) on 11/25/24 it can take upto two weeks to see increase in the WBC count. At this time, we will not give any futher GCSF support.  - continue with broad spectrum antibiotics  - f/u BCx, stool culture, neck lesion fluid culture   - Transfuse to keep Hgb>7. Transfuse platelet if count <10k,  bleeding and count <50k  - Hematology will follow    Case discussed w/ Dr. Manju Mcfarlane, PGY-5  Fellow Hematology/Oncology  pager [phone]  Available on TEAMS  After 5pm or on weekends please contact  to page on-call fellow

## 2024-12-05 NOTE — PROCEDURE NOTE - NSPOSTCAREGUIDE_GEN_A_CORE
Care for catheter as per unit/ICU protocols
Verbal/written post procedure instructions were given to patient/caregiver
Care for catheter as per unit/ICU protocols
Verbal/written post procedure instructions were given to patient/caregiver

## 2024-12-05 NOTE — CONSULT NOTE ADULT - SUBJECTIVE AND OBJECTIVE BOX
Patient is a 59y old  Female who presents with a chief complaint of DKA, neutropenic fever (05 Dec 2024 07:19)    HPI:  59y Female with DMT2 on trulicity/metformin, HTN on amlodipine, and lymphoma on chemo presents to ED with progressive SOB and weakness for 1 week and no PO intake in the last day. Patient also endorses black stool. Patient was admitted at [facility] from 10/25-10/29/24 during which she was diagnosed with a tonsillar mass with lymphadenopathy, concerning for T-Cell lymphoma, and new HTN. She was readmitted to [facility] from 11/1-11/7/24 where she was initiated on chemotherapy and allopurinol. She was also found to have new DMT2 and was started on trulicity and metformin. Last chemo on 11/25 with Brentuximab/Doxorubicin/Cytoxan/onpro. Denies chest or abdominal pain, all other ROS negative.    In the ED, patient was tachycardic and tachypneic, arrived on NRBM @15L/min. Patient was leukopenic to 0.12, anemic to 7.3, thrombocytopenic to 73K. Serum glucose was 554. Anion gap elevated to 35. Blood lactate 4.1. UA positive for glucosuria and ketones. Fluids and abx initiated. Pt found to have E. Coli bacteremia. Hospital course complicated with worsening mental status and code stroke called. Pt has STAT CT head/neck/abd pelvis done.  No intracranial bleed.  CT neck shows necrotic nodla mass with concern for superinfection. Pt abx switched to Vanc and Zosyn and ID consulted     PAST MEDICAL & SURGICAL HISTORY:  Tonsillar mass      Tongue lesion      Benign essential HTN      S/P appendectomy          Allergies  No Known Allergies    ANTIMICROBIALS (past 90 days)  MEDICATIONS  (STANDING):  cefepime   IVPB   100 mL/Hr IV Intermittent (12-02-24 @ 15:13)    cefepime   IVPB   100 mL/Hr IV Intermittent (12-03-24 @ 07:04)    cefepime   IVPB   100 mL/Hr IV Intermittent (12-04-24 @ 05:24)   100 mL/Hr IV Intermittent (12-03-24 @ 22:06)   100 mL/Hr IV Intermittent (12-03-24 @ 13:32)    cefTRIAXone   IVPB   100 mL/Hr IV Intermittent (12-04-24 @ 14:36)    piperacillin/tazobactam IVPB.   200 mL/Hr IV Intermittent (12-04-24 @ 21:59)    piperacillin/tazobactam IVPB..   25 mL/Hr IV Intermittent (12-05-24 @ 09:11)   25 mL/Hr IV Intermittent (12-05-24 @ 00:47)    vancomycin  IVPB   300 mL/Hr IV Intermittent (12-02-24 @ 16:48)    vancomycin  IVPB   250 mL/Hr IV Intermittent (12-04-24 @ 23:13)        piperacillin/tazobactam IVPB.. 3.375 every 8 hours  vancomycin  IVPB 1250 every 12 hours    MEDICATIONS  (STANDING):  fentaNYL   Infusion. 0.5 <Continuous>  insulin regular Infusion 3 <Continuous>  norepinephrine Infusion 0.05 <Continuous>  propofol Infusion 20.007 <Continuous>  senna 2 at bedtime  vasopressin Infusion 0.04 <Continuous>    SOCIAL HISTORY:       FAMILY HISTORY:  No pertinent family history in first degree relatives      REVIEW OF SYSTEMS  [  ] ROS unobtainable because:    [  ] All other systems negative except as noted below:	    Constitutional:  [ ] fever [ ] chills  [ ] weight loss  [ ] weakness  Skin:  [ ] rash [ ] phlebitis	  Eyes: [ ] icterus [ ] pain  [ ] discharge	  ENMT: [ ] sore throat  [ ] thrush [ ] ulcers [ ] exudates  Respiratory: [ ] dyspnea [ ] hemoptysis [ ] cough [ ] sputum	  Cardiovascular:  [ ] chest pain [ ] palpitations [ ] edema	  Gastrointestinal:  [ ] nausea [ ] vomiting [ ] diarrhea [ ] constipation [ ] pain	  Genitourinary:  [ ] dysuria [ ] frequency [ ] hematuria [ ] discharge [ ] flank pain  [ ] incontinence  Musculoskeletal:  [ ] myalgias [ ] arthralgias [ ] arthritis  [ ] back pain  Neurological:  [ ] headache [ ] seizures  [ ] confusion/altered mental status  Psychiatric:  [ ] anxiety [ ] depression	  Hematology/Lymphatics:  [ ] lymphadenopathy  Endocrine:  [ ] adrenal [ ] thyroid  Allergic/Immunologic:	 [ ] transplant [ ] seasonal    Vital Signs Last 24 Hrs  T(F): 99 (12-05-24 @ 04:00), Max: 99.9 (12-02-24 @ 13:26)  Vital Signs Last 24 Hrs  HR: 112 (12-05-24 @ 09:00) (107 - 141)  BP: 116/91 (12-05-24 @ 05:45) (76/39 - 205/155)  RR: 28 (12-05-24 @ 09:00)  SpO2: 98% (12-05-24 @ 09:00) (90% - 100%)  Wt(kg): --    PHYSICAL EXAM:  Constitutional: non-toxic, no distress  HEAD/EYES: anicteric, no conjunctival injection  ENT:  supple, no thrush  Cardiovascular:   normal S1, S2, no murmur, no edema  Respiratory:  clear BS bilaterally, no wheezes, no rales  GI:  soft, non-tender, normal bowel sounds  :  no rodríguez, no CVA tenderness  Musculoskeletal:  no synovitis, normal ROM  Neurologic: awake and alert, normal strength, no focal findings  Skin:  no rash, no erythema, no phlebitis  Heme/Onc: no lymphadenopathy   Psychiatric:  awake, alert, appropriate mood                            8.2    0.32  )-----------( 11       ( 05 Dec 2024 07:55 )             22.2   12-05    151[H]  |  118[H]  |  2[L]  ----------------------------<  207[H]  5.3   |  14[L]  |  0.58    Ca    6.6[L]      05 Dec 2024 05:18  Phos  3.8     12-05  Mg     1.70     12-05    TPro  4.6[L]  /  Alb  1.8[L]  /  TBili  0.7  /  DBili  x   /  AST  46[H]  /  ALT  25  /  AlkPhos  84  12-05    Urinalysis Basic - ( 05 Dec 2024 05:18 )    Color: x / Appearance: x / SG: x / pH: x  Gluc: 207 mg/dL / Ketone: x  / Bili: x / Urobili: x   Blood: x / Protein: x / Nitrite: x   Leuk Esterase: x / RBC: x / WBC x   Sq Epi: x / Non Sq Epi: x / Bacteria: x    MICROBIOLOGY:  Urinalysis with Rflx Culture (collected 04 Dec 2024 17:00)    Culture - Blood (collected 02 Dec 2024 21:54)  Source: .Blood BLOOD  Preliminary Report (05 Dec 2024 01:01):    No growth at 48 Hours    Culture - Urine (collected 02 Dec 2024 15:30)  Source: Clean Catch Clean Catch (Midstream)  Final Report (03 Dec 2024 11:54):    <10,000 CFU/mL Normal Urogenital Myesha    Culture - Blood (collected 02 Dec 2024 15:10)  Source: .Blood BLOOD  Gram Stain (03 Dec 2024 09:08):    Growth in aerobic bottle: Gram Negative Rods    Growth in anaerobic bottle: Gram Negative Rods  Final Report (04 Dec 2024 10:59):    Growth in aerobic and anaerobic bottles: Escherichia coli    See previous culture [id number]    Culture - Blood (collected 02 Dec 2024 14:50)  Source: .Blood BLOOD  Gram Stain (03 Dec 2024 08:00):    Growth in aerobic bottle: Gram Negative Rods    Growth in anaerobic bottle: Gram Negative Rods  Final Report (04 Dec 2024 10:58):    Growth in aerobic and anaerobic bottles: Escherichia coli    Direct identification is available within approximately 3-5    hours either by Blood Panel Multiplexed PCR or Direct    MALDI-TOF. Details: [email or web address]  Organism: Blood Culture PCR  Escherichia coli (04 Dec 2024 10:58)  Organism: Escherichia coli (04 Dec 2024 10:58)      Method Type: ENRIQUE      -  Ampicillin: S <=8 These ampicillin results predict results for amoxicillin      -  Ampicillin/Sulbactam: S <=4/2      -  Aztreonam: S <=4      -  Cefazolin: S <=2      -  Cefepime: S <=2      -  Cefoxitin: S <=8      -  Ceftriaxone: S <=1      -  Ciprofloxacin: S <=0.25      -  Ertapenem: S <=0.5      -  Gentamicin: S <=2      -  Imipenem: S <=1      -  Levofloxacin: S <=0.5      -  Meropenem: S <=1      -  Piperacillin/Tazobactam: S <=8      -  Tobramycin: S <=2      -  Trimethoprim/Sulfamethoxazole: S <=0.5/9.5  Organism: Blood Culture PCR (04 Dec 2024 10:58)      Method Type: PCR      -  Escherichia coli: Detec                Clostridium difficile GDH Toxins A&amp;B, EIA:   Negative (12-04-24 @ 00:40)  Clostridium difficile GDH Interpretation: Negative for toxigenic C. Difficile.  This specimen is negative for C.  Difficile glutamate dehydrogenase (GDH) antigen and negative for C.  Difficile Toxins A & B, by EIA.  GDH is a highly sensitive screening  marker for C. Difficile that is produced in large amounts by all C.  Difficile strains, both toxigenic and nontoxigenic.  This assay has not  been validated as a test of cure.  Repeat testing during the same episode  of diarrhea is of limited value and is discouraged.  The results of this  assay should always be interpreted in conjunction with patient's clinical  history. (12-04-24 @ 00:40)    RADIOLOGY:  imaging below personally reviewed and agree with findings    < from: CT Neck Soft Tissue w/ IV Cont (12.04.24 @ 15:30) >  IMPRESSION:    Exam is severely motion limited. Within these limitations:    Previously demonstrated necrotic jayla mass in the left neck with   increased fluid component and thick nodular rim of enhancement compared   to prior exam on 10/25/2024. Overlying soft tissue edema and skin   thickening suggestive of superinfection of necrotic contents with   suppuration/abscess formation. Recommend clinical correlation and   ultrasound with fluid sampling for further evaluation.    Marked decrease in size of left-sided oropharyngeal mass, not readily   apparent on this motion limited exam.    < end of copied text >    < from: CT Abdomen and Pelvis w/ IV Cont (12.04.24 @ 15:30) >  IMPRESSION:  Mild perivesicular fat stranding, which may be seen in the setting of   cystitis. Correlate with urinalysis.    Fatty infiltration of the liver.    Mild thickening of the endometrial complex, which measures 12 mm.   Consider further evaluation with pelvic ultrasound.    < end of copied text >   Patient is a 59y old  Female who presents with a chief complaint of DKA, neutropenic fever (05 Dec 2024 07:19)    HPI:  59y Female with DMT2 on trulicity/metformin, HTN on amlodipine, and lymphoma on chemo presents to ED with progressive SOB and weakness for 1 week and no PO intake in the last day. Patient also endorses black stool. Patient was admitted at [facility] from 10/25-10/29/24 during which she was diagnosed with a tonsillar mass with lymphadenopathy, concerning for T-Cell lymphoma, and new HTN. She was readmitted to [facility] from 11/1-11/7/24 where she was initiated on chemotherapy and allopurinol. She was also found to have new DMT2 and was started on trulicity and metformin. Last chemo on 11/25 with Brentuximab/Doxorubicin/Cytoxan/onpro. Denies chest or abdominal pain, all other ROS negative.    In the ED, patient was tachycardic and tachypneic, arrived on NRBM @15L/min. Patient was leukopenic to 0.12, anemic to 7.3, thrombocytopenic to 73K. Serum glucose was 554. Anion gap elevated to 35. Blood lactate 4.1. UA positive for glucosuria and ketones. Fluids and abx initiated. Pt found to have E. Coli bacteremia. Hospital course complicated with worsening mental status and code stroke called. Pt has STAT CT head/neck/abd pelvis done.  No intracranial bleed.  CT neck shows necrotic nodla mass with concern for superinfection. Pt abx switched to Vanc and Zosyn and ID consulted     PAST MEDICAL & SURGICAL HISTORY:  Tonsillar mass      Tongue lesion      Benign essential HTN      S/P appendectomy          Allergies  No Known Allergies    ANTIMICROBIALS (past 90 days)  MEDICATIONS  (STANDING):  cefepime   IVPB   100 mL/Hr IV Intermittent (12-02-24 @ 15:13)    cefepime   IVPB   100 mL/Hr IV Intermittent (12-03-24 @ 07:04)    cefepime   IVPB   100 mL/Hr IV Intermittent (12-04-24 @ 05:24)   100 mL/Hr IV Intermittent (12-03-24 @ 22:06)   100 mL/Hr IV Intermittent (12-03-24 @ 13:32)    cefTRIAXone   IVPB   100 mL/Hr IV Intermittent (12-04-24 @ 14:36)    piperacillin/tazobactam IVPB.   200 mL/Hr IV Intermittent (12-04-24 @ 21:59)    piperacillin/tazobactam IVPB..   25 mL/Hr IV Intermittent (12-05-24 @ 09:11)   25 mL/Hr IV Intermittent (12-05-24 @ 00:47)    vancomycin  IVPB   300 mL/Hr IV Intermittent (12-02-24 @ 16:48)    vancomycin  IVPB   250 mL/Hr IV Intermittent (12-04-24 @ 23:13)        piperacillin/tazobactam IVPB.. 3.375 every 8 hours  vancomycin  IVPB 1250 every 12 hours    MEDICATIONS  (STANDING):  fentaNYL   Infusion. 0.5 <Continuous>  insulin regular Infusion 3 <Continuous>  norepinephrine Infusion 0.05 <Continuous>  propofol Infusion 20.007 <Continuous>  senna 2 at bedtime  vasopressin Infusion 0.04 <Continuous>    SOCIAL HISTORY: Unable to obtain since pt intubated and sedated     FAMILY HISTORY:  No pertinent family history in first degree relatives      REVIEW OF SYSTEMS  [  ] ROS unobtainable because: Unable to obtain since pt intubated and sedated    Vital Signs Last 24 Hrs  T(F): 99 (12-05-24 @ 04:00), Max: 99.9 (12-02-24 @ 13:26)  Vital Signs Last 24 Hrs  HR: 112 (12-05-24 @ 09:00) (107 - 141)  BP: 116/91 (12-05-24 @ 05:45) (76/39 - 205/155)  RR: 28 (12-05-24 @ 09:00)  SpO2: 98% (12-05-24 @ 09:00) (90% - 100%)  Wt(kg): --    PHYSICAL EXAM:  Constitutional: non-toxic, intubated   HEAD/EYES: anicteric, no conjunctival injection  ENT:  Left neck mass   Cardiovascular:   normal S1, S2, no murmur, no edema  Respiratory:  clear BS bilaterally, no wheezes, no rales  GI:  soft, non-tender, normal bowel sounds  Neurologic: intubated   Skin:  no rash, no erythema, no phlebitis                            8.2    0.32  )-----------( 11       ( 05 Dec 2024 07:55 )             22.2   12-05    151[H]  |  118[H]  |  2[L]  ----------------------------<  207[H]  5.3   |  14[L]  |  0.58    Ca    6.6[L]      05 Dec 2024 05:18  Phos  3.8     12-05  Mg     1.70     12-05    TPro  4.6[L]  /  Alb  1.8[L]  /  TBili  0.7  /  DBili  x   /  AST  46[H]  /  ALT  25  /  AlkPhos  84  12-05    Urinalysis Basic - ( 05 Dec 2024 05:18 )    Color: x / Appearance: x / SG: x / pH: x  Gluc: 207 mg/dL / Ketone: x  / Bili: x / Urobili: x   Blood: x / Protein: x / Nitrite: x   Leuk Esterase: x / RBC: x / WBC x   Sq Epi: x / Non Sq Epi: x / Bacteria: x    MICROBIOLOGY:  Urinalysis with Rflx Culture (collected 04 Dec 2024 17:00)    Culture - Blood (collected 02 Dec 2024 21:54)  Source: .Blood BLOOD  Preliminary Report (05 Dec 2024 01:01):    No growth at 48 Hours    Culture - Urine (collected 02 Dec 2024 15:30)  Source: Clean Catch Clean Catch (Midstream)  Final Report (03 Dec 2024 11:54):    <10,000 CFU/mL Normal Urogenital Myesha    Culture - Blood (collected 02 Dec 2024 15:10)  Source: .Blood BLOOD  Gram Stain (03 Dec 2024 09:08):    Growth in aerobic bottle: Gram Negative Rods    Growth in anaerobic bottle: Gram Negative Rods  Final Report (04 Dec 2024 10:59):    Growth in aerobic and anaerobic bottles: Escherichia coli    See previous culture [id number]    Culture - Blood (collected 02 Dec 2024 14:50)  Source: .Blood BLOOD  Gram Stain (03 Dec 2024 08:00):    Growth in aerobic bottle: Gram Negative Rods    Growth in anaerobic bottle: Gram Negative Rods  Final Report (04 Dec 2024 10:58):    Growth in aerobic and anaerobic bottles: Escherichia coli    Direct identification is available within approximately 3-5    hours either by Blood Panel Multiplexed PCR or Direct    MALDI-TOF. Details: [email or web address]  Organism: Blood Culture PCR  Escherichia coli (04 Dec 2024 10:58)  Organism: Escherichia coli (04 Dec 2024 10:58)      Method Type: ENRIQUE      -  Ampicillin: S <=8 These ampicillin results predict results for amoxicillin      -  Ampicillin/Sulbactam: S <=4/2      -  Aztreonam: S <=4      -  Cefazolin: S <=2      -  Cefepime: S <=2      -  Cefoxitin: S <=8      -  Ceftriaxone: S <=1      -  Ciprofloxacin: S <=0.25      -  Ertapenem: S <=0.5      -  Gentamicin: S <=2      -  Imipenem: S <=1      -  Levofloxacin: S <=0.5      -  Meropenem: S <=1      -  Piperacillin/Tazobactam: S <=8      -  Tobramycin: S <=2      -  Trimethoprim/Sulfamethoxazole: S <=0.5/9.5  Organism: Blood Culture PCR (04 Dec 2024 10:58)      Method Type: PCR      -  Escherichia coli: Detec                Clostridium difficile GDH Toxins A&amp;B, EIA:   Negative (12-04-24 @ 00:40)  Clostridium difficile GDH Interpretation: Negative for toxigenic C. Difficile.  This specimen is negative for C.  Difficile glutamate dehydrogenase (GDH) antigen and negative for C.  Difficile Toxins A & B, by EIA.  GDH is a highly sensitive screening  marker for C. Difficile that is produced in large amounts by all C.  Difficile strains, both toxigenic and nontoxigenic.  This assay has not  been validated as a test of cure.  Repeat testing during the same episode  of diarrhea is of limited value and is discouraged.  The results of this  assay should always be interpreted in conjunction with patient's clinical  history. (12-04-24 @ 00:40)    RADIOLOGY:  imaging below personally reviewed and agree with findings    < from: CT Neck Soft Tissue w/ IV Cont (12.04.24 @ 15:30) >  IMPRESSION:    Exam is severely motion limited. Within these limitations:    Previously demonstrated necrotic jayla mass in the left neck with   increased fluid component and thick nodular rim of enhancement compared   to prior exam on 10/25/2024. Overlying soft tissue edema and skin   thickening suggestive of superinfection of necrotic contents with   suppuration/abscess formation. Recommend clinical correlation and   ultrasound with fluid sampling for further evaluation.    Marked decrease in size of left-sided oropharyngeal mass, not readily   apparent on this motion limited exam.    < end of copied text >    < from: CT Abdomen and Pelvis w/ IV Cont (12.04.24 @ 15:30) >  IMPRESSION:  Mild perivesicular fat stranding, which may be seen in the setting of   cystitis. Correlate with urinalysis.    Fatty infiltration of the liver.    Mild thickening of the endometrial complex, which measures 12 mm.   Consider further evaluation with pelvic ultrasound.    < end of copied text >   Patient is a 59y old  Female who presents with a chief complaint of DKA, neutropenic fever (05 Dec 2024 07:19)    HPI:  59y Female with DMT2 on trulicity/metformin, HTN on amlodipine, and lymphoma on chemo presents to ED with progressive SOB and weakness for 1 week and no PO intake in the last day. Patient also endorses black stool. Patient was admitted at [facility] from 10/25-10/29/24 during which she was diagnosed with a tonsillar mass with lymphadenopathy, concerning for T-Cell lymphoma, and new HTN. She was readmitted to [facility] from 11/1-11/7/24 where she was initiated on chemotherapy and allopurinol. She was also found to have new DMT2 and was started on trulicity and metformin. Last chemo on 11/25 with Brentuximab/Doxorubicin/Cytoxan/onpro. Denies chest or abdominal pain, all other ROS negative.    In the ED, patient was tachycardic and tachypneic, arrived on NRBM @15L/min. Patient was leukopenic to 0.12, anemic to 7.3, thrombocytopenic to 73K. Serum glucose was 554. Anion gap elevated to 35. Blood lactate 4.1. UA positive for glucosuria and ketones. Fluids and abx initiated. Pt found to have E. Coli bacteremia. Hospital course complicated with worsening mental status and code stroke called. Pt has STAT CT head/neck/abd pelvis done.  No intracranial bleed.  CT neck shows necrotic nodla mass with concern for superinfection. Pt abx switched to Vanc and Zosyn and ID consulted     PAST MEDICAL & SURGICAL HISTORY:  Tonsillar mass      Tongue lesion      Benign essential HTN      S/P appendectomy          Allergies  No Known Allergies    ANTIMICROBIALS (past 90 days)  MEDICATIONS  (STANDING):  cefepime   IVPB   100 mL/Hr IV Intermittent (12-02-24 @ 15:13)    cefepime   IVPB   100 mL/Hr IV Intermittent (12-03-24 @ 07:04)    cefepime   IVPB   100 mL/Hr IV Intermittent (12-04-24 @ 05:24)   100 mL/Hr IV Intermittent (12-03-24 @ 22:06)   100 mL/Hr IV Intermittent (12-03-24 @ 13:32)    cefTRIAXone   IVPB   100 mL/Hr IV Intermittent (12-04-24 @ 14:36)    piperacillin/tazobactam IVPB.   200 mL/Hr IV Intermittent (12-04-24 @ 21:59)    piperacillin/tazobactam IVPB..   25 mL/Hr IV Intermittent (12-05-24 @ 09:11)   25 mL/Hr IV Intermittent (12-05-24 @ 00:47)    vancomycin  IVPB   300 mL/Hr IV Intermittent (12-02-24 @ 16:48)    vancomycin  IVPB   250 mL/Hr IV Intermittent (12-04-24 @ 23:13)        piperacillin/tazobactam IVPB.. 3.375 every 8 hours  vancomycin  IVPB 1250 every 12 hours    MEDICATIONS  (STANDING):  fentaNYL   Infusion. 0.5 <Continuous>  insulin regular Infusion 3 <Continuous>  norepinephrine Infusion 0.05 <Continuous>  propofol Infusion 20.007 <Continuous>  senna 2 at bedtime  vasopressin Infusion 0.04 <Continuous>      SOCIAL HISTORY: Unable to obtain since pt intubated and sedated         FAMILY HISTORY:  Unable to obtain due to pt's underlying mental status.         REVIEW OF SYSTEMS  [  ] ROS unobtainable because: Unable to obtain since pt intubated and sedated      Vital Signs Last 24 Hrs  T(F): 99 (12-05-24 @ 04:00), Max: 99.9 (12-02-24 @ 13:26)  Vital Signs Last 24 Hrs  HR: 112 (12-05-24 @ 09:00) (107 - 141)  BP: 116/91 (12-05-24 @ 05:45) (76/39 - 205/155)  RR: 28 (12-05-24 @ 09:00)  SpO2: 98% (12-05-24 @ 09:00) (90% - 100%)  Wt(kg): --      PHYSICAL EXAM:  Constitutional: non-toxic, intubated   HEAD/EYES: anicteric  ENT:  Left neck mass   Cardiovascular:   normal S1, S2,  Respiratory: + air entry b/l  GI:  soft, non-distended  : + rodríguez   Neurologic: intubated   Skin:  no rash  Extremities: No edema                               8.2    0.32  )-----------( 11       ( 05 Dec 2024 07:55 )             22.2   12-05    151[H]  |  118[H]  |  2[L]  ----------------------------<  207[H]  5.3   |  14[L]  |  0.58    Ca    6.6[L]      05 Dec 2024 05:18  Phos  3.8     12-05  Mg     1.70     12-05    TPro  4.6[L]  /  Alb  1.8[L]  /  TBili  0.7  /  DBili  x   /  AST  46[H]  /  ALT  25  /  AlkPhos  84  12-05    Urinalysis Basic - ( 05 Dec 2024 05:18 )    Color: x / Appearance: x / SG: x / pH: x  Gluc: 207 mg/dL / Ketone: x  / Bili: x / Urobili: x   Blood: x / Protein: x / Nitrite: x   Leuk Esterase: x / RBC: x / WBC x   Sq Epi: x / Non Sq Epi: x / Bacteria: x    MICROBIOLOGY:  Urinalysis with Rflx Culture (collected 04 Dec 2024 17:00)    Culture - Blood (collected 02 Dec 2024 21:54)  Source: .Blood BLOOD  Preliminary Report (05 Dec 2024 01:01):    No growth at 48 Hours    Culture - Urine (collected 02 Dec 2024 15:30)  Source: Clean Catch Clean Catch (Midstream)  Final Report (03 Dec 2024 11:54):    <10,000 CFU/mL Normal Urogenital Myesha    Culture - Blood (collected 02 Dec 2024 15:10)  Source: .Blood BLOOD  Gram Stain (03 Dec 2024 09:08):    Growth in aerobic bottle: Gram Negative Rods    Growth in anaerobic bottle: Gram Negative Rods  Final Report (04 Dec 2024 10:59):    Growth in aerobic and anaerobic bottles: Escherichia coli    See previous culture [id number]    Culture - Blood (collected 02 Dec 2024 14:50)  Source: .Blood BLOOD  Gram Stain (03 Dec 2024 08:00):    Growth in aerobic bottle: Gram Negative Rods    Growth in anaerobic bottle: Gram Negative Rods  Final Report (04 Dec 2024 10:58):    Growth in aerobic and anaerobic bottles: Escherichia coli    Direct identification is available within approximately 3-5    hours either by Blood Panel Multiplexed PCR or Direct    MALDI-TOF. Details: [email or web address]  Organism: Blood Culture PCR  Escherichia coli (04 Dec 2024 10:58)  Organism: Escherichia coli (04 Dec 2024 10:58)      Method Type: ENRIQUE      -  Ampicillin: S <=8 These ampicillin results predict results for amoxicillin      -  Ampicillin/Sulbactam: S <=4/2      -  Aztreonam: S <=4      -  Cefazolin: S <=2      -  Cefepime: S <=2      -  Cefoxitin: S <=8      -  Ceftriaxone: S <=1      -  Ciprofloxacin: S <=0.25      -  Ertapenem: S <=0.5      -  Gentamicin: S <=2      -  Imipenem: S <=1      -  Levofloxacin: S <=0.5      -  Meropenem: S <=1      -  Piperacillin/Tazobactam: S <=8      -  Tobramycin: S <=2      -  Trimethoprim/Sulfamethoxazole: S <=0.5/9.5  Organism: Blood Culture PCR (04 Dec 2024 10:58)      Method Type: PCR      -  Escherichia coli: Detec        Clostridium difficile GDH Toxins A&amp;B, EIA:   Negative (12-04-24 @ 00:40)  Clostridium difficile GDH Interpretation: Negative for toxigenic C. Difficile.  This specimen is negative for C.  Difficile glutamate dehydrogenase (GDH) antigen and negative for C.  Difficile Toxins A & B, by EIA.  GDH is a highly sensitive screening  marker for C. Difficile that is produced in large amounts by all C.  Difficile strains, both toxigenic and nontoxigenic.  This assay has not  been validated as a test of cure.  Repeat testing during the same episode  of diarrhea is of limited value and is discouraged.  The results of this  assay should always be interpreted in conjunction with patient's clinical  history. (12-04-24 @ 00:40)        RADIOLOGY:  imaging below personally reviewed and agree with findings    < from: CT Neck Soft Tissue w/ IV Cont (12.04.24 @ 15:30) >  IMPRESSION:    Exam is severely motion limited. Within these limitations:    Previously demonstrated necrotic jayla mass in the left neck with   increased fluid component and thick nodular rim of enhancement compared   to prior exam on 10/25/2024. Overlying soft tissue edema and skin   thickening suggestive of superinfection of necrotic contents with   suppuration/abscess formation. Recommend clinical correlation and   ultrasound with fluid sampling for further evaluation.    Marked decrease in size of left-sided oropharyngeal mass, not readily   apparent on this motion limited exam.      < from: CT Abdomen and Pelvis w/ IV Cont (12.04.24 @ 15:30) >  IMPRESSION:  Mild perivesicular fat stranding, which may be seen in the setting of   cystitis. Correlate with urinalysis.    Fatty infiltration of the liver.    Mild thickening of the endometrial complex, which measures 12 mm.   Consider further evaluation with pelvic ultrasound.

## 2024-12-05 NOTE — CONSULT NOTE ADULT - ASSESSMENT
59y Female with DMT2 on trulicity/metformin, HTN on amlodipine, and lymphoma on chemo presents to ED with progressive SOB and weakness for 1 week and no PO intake in the last day.     Assessment:  #Bacteremia  #Necrotic jayla mass with superinfection  -Patient was admitted at [facility] from 10/25-10/29/24 during which she was diagnosed with a tonsillar mass with lymphadenopathy, concerning for T-Cell lymphoma, and new HTN. She was readmitted to [facility] from 11/1-11/7/24 where she was initiated on chemotherapy and allopurinol. She was also found to have new DMT2 and was started on trulicity and metformin. Last chemo on 11/25 with Brentuximab/Doxorubicin/Cytoxan/onpro.  -Pt admitted 12/2 for sepsis and HAGMA/LOUISA/lactic acidosis due to DKA  -CT neck 12/4 shows large fluid collection/necrotic jayla mass in the left neck in the region of previously demonstrated necrotic lymph nodes, now with increase in size of fluid component measuring 9.9 x 6.9 x 6.1 cm with thick nodular rim of enhancement in some areas. Findings c/w superinfected necrotic lymph node mass.   -CT A/P 12/4 Mild perivesicular fat stranding, which may be seen in the setting of cystitis. Fatty infiltration of the liver. Mild thickening of the endometrial complex, which measures 12 mm.   -Pt now on Vanc and Zosyn    Recommendation:   59y Female with DMT2 on trulicity/metformin, HTN on amlodipine, and lymphoma on chemo presents to ED with progressive SOB and weakness for 1 week and no PO intake in the last day.     Assessment:  #Bacteremia  #Necrotic jayla mass with superinfection  -Patient was admitted at [facility] from 10/25-10/29/24 during which she was diagnosed with a tonsillar mass with lymphadenopathy, concerning for T-Cell lymphoma, and new HTN. She was readmitted to [facility] from 11/1-11/7/24 where she was initiated on chemotherapy and allopurinol. She was also found to have new DMT2 and was started on trulicity and metformin. Last chemo on 11/25 with Brentuximab/Doxorubicin/Cytoxan/onpro.  -Pt admitted 12/2 for sepsis and HAGMA/LOUISA/lactic acidosis due to DKA  -CT neck 12/4 shows large fluid collection/necrotic jayla mass in the left neck in the region of previously demonstrated necrotic lymph nodes, now with increase in size of fluid component measuring 9.9 x 6.9 x 6.1 cm with thick nodular rim of enhancement in some areas. Findings c/w superinfected necrotic lymph node mass.   -CT A/P 12/4 Mild perivesicular fat stranding, which may be seen in the setting of cystitis. Fatty infiltration of the liver. Mild thickening of the endometrial complex, which measures 12 mm.   -GI PCR neg  -Pt now on Vanc and Zosyn    Recommendation:  -Unclear if bacteremia 2/2 infected neck mass vs GI source (pt having diarrhea)   -Cont Zosyn for now, can d/c Vanc  -Now s/p needle aspiration with 25cc thick debris obtained 12/5 with ENT. F/U CX  -Cont to monitor fever and WBC curve     Rhonda Khan  ID Fellow

## 2024-12-05 NOTE — CHART NOTE - NSCHARTNOTEFT_GEN_A_CORE
______________ CRITICAL CARE NUTRITION FOLLOW-UP ________________        --Medical Course--  59y  Female w T-Cell lymphoma, HTN, DM2 admitted with DKA and neutropenic fevers, sepsis & E. Coli bacteremia.  Also with diarrhea.  Pt. observed with change in mental status yesterday (12/4) & facial twitching,  S/p code stroke.  AMS deemed likely due to encephalopathy, per chart.  Pt. with findings of necrotic neck mass with CT findings of abscess/superinfection.  Also had increasing lactate and now with hypoxic respiratory failure requiring intubation (12/5).    Spoke with RN & resident physician as well as pulmonary fellow.  Per team to attempt to insert OGT with eventual plan to start tube feeding, as/if Pt.'s hemodynamic status improves.  Currently, Pt. on pressor support with MAP <65.  Also remains on insulin drip.  Sedated with Propofol and Fentanyl.  ~681 KCals to be provided by Propofol over 24hrs @ current rate..         --Nutrition Course--  Pt. has been NPO since admission (x3d).  Would maintain NPO status @ this time and only if and when it becomes medically feasible and appropriate to initiate enteral nutrition can start tube feeding with [employer] Glucose Support 1.2 starting @ 10mL/hr with eventual goal of 40mL/hr x 18hrs + Liquid Protein Supplement (LPS) 3 packets daily.  TF + LPS would give 12 KCals/kg actual weight & 1.6g protein/kg Ideal Body Weight and provide:    720mL total volume    864 KCals (+ 300 KCals from LPS) = 1164 KCals (+ Propofol KCals)     46g protein (+ 45g additional protein via LPS)= 91g total protein    547mL free water        __________Diet Order_________  Diet, NPO:      Special Instructions for Nursing:  CODE STROKE; NPO until Dysphagia screen or Speech Bedside Swallow Evaluation completed and passed (12-04-24 @ 15:02) [Active]            Weight:      Height:  65" / 165.1cm         Ideal Body Weight: 125lbs / 56.8kg  95.2kg (12/5)   BMI = 34.8 kg/m^2  100.2kg (12/4)  95.8kg (12/2)          _____Re- Estimated Energy Needs _____  11-14 KCals/kg (actual weight 95.2kg)= 3712-3935 KCals/d  1.5-1.8g protein/kg (IBW) = 85-102g protein/d            Edema: 1+ generalized    Last bowel movement: Liquid (12/5).  ~400mL rectal tube output in past 24hrs.    Skin: No pressure injuries.     ________________________Pertinent Medications____________   MEDICATIONS  (STANDING):  chlorhexidine 0.12% Liquid 15 milliLiter(s) Oral Mucosa every 12 hours  chlorhexidine 2% Cloths 1 Application(s) Topical <User Schedule>  fentaNYL   Infusion. 0.5 MICROgram(s)/kG/Hr (4.79 mL/Hr) IV Continuous <Continuous>  insulin regular Infusion 3 Unit(s)/Hr (3 mL/Hr) IV Continuous <Continuous>  magnesium sulfate  IVPB 2 Gram(s) IV Intermittent every 2 hours  norepinephrine Infusion 0.05 MICROgram(s)/kG/Min (4.49 mL/Hr) IV Continuous <Continuous>  petrolatum Ophthalmic Ointment 1 Application(s) Both EYES two times a day  piperacillin/tazobactam IVPB.. 3.375 Gram(s) IV Intermittent every 8 hours  propofol Infusion 20.007 MICROgram(s)/kG/Min (11.5 mL/Hr) IV Continuous <Continuous>  senna 2 Tablet(s) Oral at bedtime  sodium chloride 0.45%. 1000 milliLiter(s) (125 mL/Hr) IV Continuous <Continuous>  vasopressin Infusion 0.04 Unit(s)/Min (6 mL/Hr) IV Continuous <Continuous>    MEDICATIONS  (PRN):          _________________________Pertinent Labs____________________     12-05    151[H]  |  118[H]  |  2[L]  ----------------------------<  207[H]  5.3   |  14[L]  |  0.58    Ca    6.6[L]      05 Dec 2024 05:18  Phos  3.8     12-05  Mg     1.70     12-05    Blood Gas Arterial, Lactate: 8.2mmol/L (12.05.24 @ 07:55)        CAPILLARY BLOOD GLUCOSE    POCT Blood Glucose.: 187 mg/dL (12-05-24 @ 07:54)  POCT Blood Glucose.: 186 mg/dL (12-05-24 @ 07:06)  POCT Blood Glucose.: 190 mg/dL (12-05-24 @ 06:07)  POCT Blood Glucose.: 220 mg/dL (12-05-24 @ 04:31)  POCT Blood Glucose.: 151 mg/dL (12-05-24 @ 03:44)  POCT Blood Glucose.: 228 mg/dL (12-05-24 @ 02:41)  POCT Blood Glucose.: 179 mg/dL (12-05-24 @ 01:22)  POCT Blood Glucose.: 213 mg/dL (12-05-24 @ 00:15)  POCT Blood Glucose.: 194 mg/dL (12-04-24 @ 23:09)  POCT Blood Glucose.: 198 mg/dL (12-04-24 @ 22:03)  POCT Blood Glucose.: 183 mg/dL (12-04-24 @ 21:05)  POCT Blood Glucose.: 172 mg/dL (12-04-24 @ 20:05)  POCT Blood Glucose.: 159 mg/dL (12-04-24 @ 19:05)  POCT Blood Glucose.: 155 mg/dL (12-04-24 @ 18:03)  POCT Blood Glucose.: 161 mg/dL (12-04-24 @ 17:03)  POCT Blood Glucose.: 153 mg/dL (12-04-24 @ 16:06)  POCT Blood Glucose.: 152 mg/dL (12-04-24 @ 14:20)  POCT Blood Glucose.: 140 mg/dL (12-04-24 @ 13:43)  POCT Blood Glucose.: 104 mg/dL (12-04-24 @ 13:09)  POCT Blood Glucose.: 120 mg/dL (12-04-24 @ 12:07)  POCT Blood Glucose.: 122 mg/dL (12-04-24 @ 11:01)  POCT Blood Glucose.: 128 mg/dL (12-04-24 @ 10:06)      Triglycerides, Serum: 121 mg/dL (11-25-24 @ 08:41)          PLAN/RECOMMENDATIONS:    1) NPO pending improvement in hemodynamic status.   2) Obtain daily weights  3) Monitor GI status, electrolytes, glucose    RDN remains available and will f/u PRN.          Jayleen Ontiveros, MOSHE, CDN       pager [phone] or MS Teams

## 2024-12-05 NOTE — PROCEDURE NOTE - NSPROCDETAILS_GEN_ALL_CORE
location identified, draped/prepped, sterile technique used/blood seen on insertion/dressing applied/flushes easily/secured in place/sterile technique, catheter placed
location identified, draped/prepped, sterile technique used/blood seen on insertion/dressing applied/flushes easily/secured in place/sterile technique, catheter placed
guidewire recovered/lumen(s) aspirated and flushed/sterile dressing applied/sterile technique, catheter placed/ultrasound guidance with use of sterile gel and probe cove
location identified, draped/prepped, sterile technique used/blood seen on insertion/dressing applied/flushes easily/secured in place
patient pre-oxygenated, tube inserted, placement confirmed

## 2024-12-05 NOTE — CHART NOTE - NSCHARTNOTEFT_GEN_A_CORE
: Kamlesh    INDICATION: Heart/Lung Ultrasound    PROCEDURE:  [x] LIMITED ECHO  [ ] LIMITED CHEST  [ ] LIMITED RETROPERITONEAL  [ ] LIMITED ABDOMINAL  [ ] LIMITED DVT  [ ] NEEDLE GUIDANCE VASCULAR  [ ] NEEDLE GUIDANCE THORACENTESIS  [ ] NEEDLE GUIDANCE PARACENTESIS  [ ] NEEDLE GUIDANCE PERICARDIOCENTESIS  [ ] OTHER    FINDINGS: LV systolic function appears grossly normal. RV appears smaller than LV. No pericardial effusion seen. IVC measured at 1.12cm. Lung POCUS with predominant B-line pattern. US of neck mass showing small fluid component.        Images stored on [employer]. : Kamlesh    INDICATION: acute hypoxemic respiratory failure     PROCEDURE:  [x] LIMITED ECHO  [ ] LIMITED CHEST  [ ] LIMITED RETROPERITONEAL  [ ] LIMITED ABDOMINAL  [ ] LIMITED DVT  [ ] NEEDLE GUIDANCE VASCULAR  [ ] NEEDLE GUIDANCE THORACENTESIS  [ ] NEEDLE GUIDANCE PARACENTESIS  [ ] NEEDLE GUIDANCE PERICARDIOCENTESIS  [ ] OTHER    FINDINGS: LV systolic function appears grossly normal. RV appears smaller than LV. No pericardial effusion seen. IVC measured at 1.12cm in limited views. Lung POCUS with predominant B-line pattern.     Images stored on [employer].    Attending Attestation:  I was present during the key portions of the procedure and immediately available during the entire procedure.  I have personally reviewed the images and agree with the interpretation above by the resident/fellow/ACP.    Eder Gaxiola MD  Attending  Pulmonary & Critical Care Medicine

## 2024-12-05 NOTE — PROGRESS NOTE ADULT - ASSESSMENT
59y Female with DMT2 on trulicity/metformin, HTN on amlodipine, and lymphoma on chemo presents to ED with progressive SOB and weakness for 1 week and no PO intake in the last day, found to be in DKA and septic iso pancytopenia. Patient was intubated iso worsening lactic acidosis and tachypnea and AMS. Found to have necrotic superinfected mass of L neck, evaluated by ENT for possible drainage.    NEURO:  #AMS  #Left-sided facial twitching  -Likely iso DKA, sepsis  -AOx2  -Treat underlying DKA and infection  -CTH 12/4 negative for acute infarct, hemorrhage, mass effect  -vEEG to evaluate for seizure activity    #Sedation  -Fentanyl 0.5, levophed 0.05, propofol 20, vasopressin 0.02  -RASS goal -2 to -3  -CAM    #Eye care  -Lacri-lube    ENT:  #Necrotic neck mass  -CT scan with evidence of superinfection/abscess  -Appreciate ENT recs for possible drainage, fluid sample  -If no surgical plans from ENT, consult IR    PULM:  #Hypoxic respiratory failure  -Unclear atelectasis vs infection  -CXR and CTA clear with no evidence of PE  -Intubated on 12/5    CARDIOVASCULAR:  #HTN  #Dehydration - IVC 1.5  #Sinus Tachycardia  -Monitor off home amlodipine iso sepsis  -Fluid bolus prn    ENDOCRINE:  #DKA  #T2DM  -On admission, serum glucose 554. Anion gap 35. Blood lactate 4.1. UA positive for glucosuria and ketones.  -C/w reducing Insulin drip and fluids with K  -Monitor BMP and BHB q4h  -A1C 12,2,  -->  581  -F/u islet antibody and anti-SOHA ab to evaluate for autoimmune diabetes  -Hold home trulicity and metformin while inpatient  -Endocrinology consult appreciated  -Plan to transition to basal bolus when bicarb >20      HEME/ONC:  #T-cell lymphoma  -Monitor TLS labs daily  -Heme/onc consult appreciated  -Follows with Dr. Womack for outpatient heme-onc; last chemo on 11/25 with Brentuximab/Doxorubicin/Cytoxan/onpro    #Pancytopenic - likely 2/2 chemo vs infection  -S/p 3 units pRBCs and 1 unit platelets   -Hgb >7  -Plt >10  -Monitor CBC  -Heme onc consult appreciated      GI  No active issues  #Diarrhea  -Brown colored diarrhea noted today  -C. diff and GI PCR negative  -F/u stool culture  -Fluid replacement prn    #Diet  -NPO while intubated  -Obtain nutrition consult    :  #Thickened endometrium on CT pelvis  -F/u pelvic US    RENAL:  #Hypokalemia, hypomagnesemia  -Replete electrolytes  -Monitor BMP q4h    ID:  #Sepsis  #Neutropenic fever  -E. Coli bacteremia (pan sensitive), may be iso suppurative neck abscess vs GI illness given diarrhea vs unknown source  -Uptrending lactic acidosis  -F/u pan culture  -S/p cefepime 2g q8h --> changed to CTX 2g q24h --> changed to Vanc/Zosyn    PROPHYLAXIS:  -Hold heparin iso thrombocytopenia    LINES/TUBES:  -L and R upper arm peripheral IV  -Central line - R IJ  -A line - L radial  -Meyer  -Rectal tube    ETHICS:  Code: Full 59y Female with DMT2 on trulicity/metformin, HTN on amlodipine, and lymphoma on chemo presents to ED with progressive SOB and weakness for 1 week and no PO intake in the last day, found to be in DKA and septic iso pancytopenia. Patient was intubated iso worsening lactic acidosis and tachypnea and AMS. Found to have necrotic superinfected mass of L neck, evaluated by ENT for possible drainage.    NEURO:  #AMS  #Left-sided facial twitching  -Likely iso DKA, sepsis  -AOx2  -Treat underlying DKA and infection  -CTH 12/4 negative for acute infarct, hemorrhage, mass effect  -vEEG to evaluate for seizure activity    #Sedation  -Fentanyl 0.5, levophed 0.05, propofol 20, vasopressin 0.02  -RASS 5, goal -2 to -3    #Eye care  -Lacri-lube    ENT:  #Necrotic neck mass  -CT scan with evidence of superinfection/abscess  -Obtain neck US for further fluid evaluation  -Appreciate ENT/IR recs for possible drainage for fluid sample/culture    PULM:  #Hypoxic respiratory failure  -Unclear atelectasis vs infection  -CXR and CTA clear with no evidence of PE  -Intubated on 12/5    CARDIOVASCULAR:  #HTN  #Dehydration - IVC 1.5  #Sinus Tachycardia  -Monitor off home amlodipine iso sepsis  -Fluid bolus prn    ENDOCRINE:  #DKA  #T2DM  -On admission, serum glucose 554. Anion gap 35. Blood lactate 4.1. UA positive for glucosuria and ketones.  -S/p Insulin drip and fluids with K  -Monitor BMP and BHB q4h  -A1C 12,2,  -->  581  -F/u islet antibody and anti-SOHA ab to evaluate for autoimmune diabetes  -Hold home trulicity and metformin while inpatient  -Endocrinology recs appreciated  -Transitioned to basal bolus on 12/5       HEME/ONC:  #T-cell lymphoma  -Monitor TLS labs daily  -Heme/onc consult appreciated  -Follows with Dr. Womack for outpatient heme-onc; last chemo on 11/25 with Brentuximab/Doxorubicin/Cytoxan/onpro    #Pancytopenic - likely 2/2 chemo vs infection  -S/p 1 unit pRBCs x3 and 1 unit platelets x2  -Hgb >7  -Plt >10  -Monitor CBC  -Heme onc consult appreciated    GI  No active issues  #Diarrhea  -Brown colored diarrhea noted today  -C. diff and GI PCR negative  -F/u stool culture  -Fluid replacement prn    #Diet  -OG tube placed  -Appreciate nutrition recs for tube feeds  -Bowel regimen: senna at bedtime    :  #Thickened endometrium on CT pelvis  -F/u pelvic US    RENAL:  #Hypocalcemia  #Hypomagnesemia  #Hypernatremia  -Replete electrolytes  -Monitor BMP q4h  -Start free water via OG tube  -Trend ionized Ca daily    ID:  #Sepsis  #Neutropenic fever  -E. Coli bacteremia (pan sensitive), may be iso suppurative neck abscess vs GI illness given diarrhea vs unknown source  -Uptrending lactic acidosis  -F/u repeat Bcx and sputum culture  -S/p cefepime 2g q8h --> changed to CTX 2g q24h --> changed to Vanc/Zosyn  -Consult ID    PROPHYLAXIS:  -Hold heparin iso thrombocytopenia    LINES/TUBES:  -L and R upper arm peripheral IV  -Central line - R IJ  -A line - L radial  -Meyer  -Rectal tube    ETHICS:  Code: Full

## 2024-12-05 NOTE — PROCEDURE NOTE - NSPROCNAME_GEN_A_CORE
Central Line Insertion
Peripheral Line Insertion
Arterial Puncture/Cannulation
Peripheral Line Insertion
Peripheral Line Insertion
Tracheal Intubation
Peripheral Line Insertion

## 2024-12-05 NOTE — PROGRESS NOTE ADULT - ATTENDING COMMENTS
59F with DMT2 on trulicity/metformin, HTN on amlodipine, and recently diagnosed T cell lymphoma (dx 10/25/24) on BVCHP presents to ED with progressive SOB and weakness for 1 week, found to have DKA and E.Coli Bacteremia. Hematology consulted for T Cell Lymphoma. Respiratory failure, on pressors.    # T Cell Lymphoma  # Neutropenic Fever  # E. Coli Bacteremia  # DKA  # Dark Stool  *GI PCR -ve  - Dx T cell lymphoma on 10/25/24 from left tonsillar mass biopsy. Initially got cytoxan+adriamycin on 11/2/24. After tumor board discussion, pt was switched to BVCHP regimen and received C1D1 on 11/6/24. C2D1 BVCHP was on 11/25/24 with Neulasta.  - [facility] hospitalization complicated by tachypnea requiring intubation and MICU admission  - CT Neck (12/4) showed previously demonstrated necrotic jayla mass in the left neck with increased fluid component and thick nodular rim of enhancement compared to prior exam on 10/25/2024. Overlying soft tissue edema and skin   thickening suggestive of superinfection of necrotic contents with suppuration/abscess formation.  - s/p needle aspiration with 25cc thick debris obtained 12/5 by ENT  - as patient got Neulasta (peg-Filgrastrim) on 11/25/24 it can take upto two weeks to see increase in the WBC count. At this time, we will not give any futher GCSF support.  - continue with broad spectrum antibiotics  - Transfuse to keep Hgb>7. Transfuse platelet if count <10k,  bleeding and count <50k  - Hematology will follow

## 2024-12-05 NOTE — PROGRESS NOTE ADULT - ATTENDING COMMENTS
59 F with T cell lymphoma on chemo m, htn, DM here with sepsis due to e coli bacteremia and found to have HAGMA/LOUISA/lactic acidosis due to DKA, yesterday with worsening lactic acidosis with concern for neck abscess and acute hypoxemic respiratory failure requiring intubation.      CT done yesterday shows necrotic neck mass with abscess, suspect this is driving her current process/illness    # acute hypoxemic respiratory failure  # septic shock due to e coli bacteremia and neck abscess  # DKA  # neck abscess  - c/w full vent support and deep sedation  - f/u ENT and IR as patient needs source control; f/u repeat cultures  - c/w broad abx, will call ID  - switch insulin gtt to nph  - will try IVF bolus and repeat labs    overall prognosis is poor given severe shock; daughter is flying in

## 2024-12-05 NOTE — CONSULT NOTE ADULT - SUBJECTIVE AND OBJECTIVE BOX
HPI: 59y Female w PMH DMT2 on trulicity/metformin, HTN on amlodipine, and T cell lymphoma on brentuximab/doxorubicin/cytoxan/onpro last chemo 11/25 who presented to ED with progressive SOB, weakness x1 week and poor PO intake. Pt was previously seen by ENT on 10/25/24 for L neck swelling and bx of L tonsillar mass was performed, dx of T cell lymphoma was made at that time. Pt has since been on chemo and following with heme onc. On presentation to ED on 12/2 pt noted to be tachycardic and tachypneic, leukopenic to 0.12, found to have DKA and sepsis 2/2 E coli bacteremia and started on cefepime, now transitioned to CTX. On 12/4 at 244pm, pt noted to have AMS, not following commands, stroke code called, though CTH without evidence of stroke. ENT consulted to eval for left neck mass as source for septicemia in the absence of other sources.   Pt unresponsive and uncooperative with history taking and exam, however examination by ENT on 10/25 notes presence of large left neck mass that per pt was present ~3 weeks prior to this initial eval.     WBC 0.22, afebrile, on zosyn      CT neck 12/4:  Large fluid collection/necrotic jayla mass in the left neck   in the region of previously demonstrated necrotic lymph nodes, now with   increase in size of fluid component measuring 9.9 x 6.9 x 6.1 cm (CC x AP   x TV) with thick nodular rim of enhancement in some areas. There is   associated fat stranding and mild overlying skin thickening.  Marked decrease in size of left-sided oropharyngeal mass, not readily   apparent on this motion limited exam.        Allergies    No Known Allergies    Intolerances        PAST MEDICAL & SURGICAL HISTORY:  Tonsillar mass      Tongue lesion      Benign essential HTN      S/P appendectomy          SOCIAL HISTORY:  Tobacco History:  ETOH Use:   Drug Use:     FAMILY HISTORY:  No pertinent family history in first degree relatives        REVIEW OF SYSTEMS    General:	  As per HPI      MEDICATIONS:        Vital Signs Last 24 Hrs  T(C): 37.1 (04 Dec 2024 20:00), Max: 37.3 (04 Dec 2024 12:00)  T(F): 98.8 (04 Dec 2024 20:00), Max: 99.2 (04 Dec 2024 12:00)  HR: 130 (04 Dec 2024 23:00) (121 - 139)  BP: 147/53 (04 Dec 2024 23:00) (101/66 - 147/53)  BP(mean): 75 (04 Dec 2024 23:00) (66 - 88)  RR: 31 (04 Dec 2024 23:00) (20 - 33)  SpO2: 100% (04 Dec 2024 23:00) (99% - 100%)    Parameters below as of 04 Dec 2024 23:00  Patient On (Oxygen Delivery Method): room air        LABS:  CBC-    12-04    151[H]  |  119[H]  |  2[L]  ----------------------------<  208[H]  5.7[H]   |  11[L]  |  0.44[L]    Ca    6.6[L]      04 Dec 2024 23:40  Phos  2.2     12-04  Mg     1.90     12-04    TPro  5.0[L]  /  Alb  1.8[L]  /  TBili  1.0  /  DBili  x   /  AST  42[H]  /  ALT  25  /  AlkPhos  85  12-04    Coagulation Studies-  PT/INR - ( 04 Dec 2024 23:40 )   PT: 14.5 sec;   INR: 1.22 ratio         PTT - ( 04 Dec 2024 23:40 )  PTT:30.6 sec  Endocrine Panel-  --  --  6.6 mg/dL  --  --  6.9 mg/dL  --  --  7.3 mg/dL  --  --  7.5 mg/dL  --  --  7.5 mg/dL  --  --  7.7 mg/dL  --  --  7.9 mg/dL  --  --  8.2 mg/dL  --  --  8.4 mg/dL  --  --  8.5 mg/dL  --  --  8.9 mg/dL  --  --  8.8 mg/dL  1.74 uIU/mL  --  9.0 mg/dL        PHYSICAL EXAM:    ENT EXAM-   Constitutional: altered, minimally responsive and cooperative on exam  Voice: No hoarseness.     Head:  normocephalic, atraumatic.   Ears:  External ears normal  Nose:  Septum intact  OC/OP: Tongue midline, cannot visualize full OC due to pt intolerance  Neck: large left neck mass 7-8 cm, fluctuant    LARYNGOSCOPY EXAM:     Indication:    Anesthesia: None    Flexible laryngoscopy was performed and revealed the following:    -- Nasopharynx had no mass or exudate.  -- hypopharyngeal crowding   -- thick inspissated secretions in BOT/vallecula and on epiglottis  - mild-mod L>R arytenoid edema    -- True vocal folds were fully mobile and without lesions.   Airway widely patent    The patient tolerated the procedure well.        RADIOLOGY & ADDITIONAL STUDIES:      Assessment/Plan:    59y Female w PMH DMT2 on trulicity/metformin, HTN on amlodipine, and T cell lymphoma on brentuximab/doxorubicin/cytoxan/onpro last chemo 11/25 who presented to ED with progressive SOB, weakness x1 week and poor PO intake, found to be septic with E coli bacteremia of unknown source. ENT consulted to eval for left neck mass as source for septicemia in the absence of other sources. CT neck reveals large fluid collection/necrotic jayla mass in the left neck in the region of previously demonstrated necrotic lymph nodes, now with increase in size of fluid component measuring 9.9 x 6.9 x 6.1 cm with thick nodular rim of enhancement in some areas. Findings c/w superinfected necrotic lymph node mass.     - c/w IV abx   - ENT to discuss role for possible drainage in the setting of malignancy with head and neck attendings

## 2024-12-05 NOTE — PROCEDURE NOTE - NSSITEPREP_SKIN_A_CORE
chlorhexidine
chlorhexidine/Adherence to aseptic technique: hand hygiene prior to donning barriers (gown, gloves), don cap and mask, sterile drape over patient
chlorhexidine/Adherence to aseptic technique: hand hygiene prior to donning barriers (gown, gloves), don cap and mask, sterile drape over patient
chlorhexidine
chlorhexidine
Adherence to aseptic technique: hand hygiene prior to donning barriers (gown, gloves), don cap and mask, sterile drape over patient

## 2024-12-05 NOTE — PROCEDURE NOTE - NSINDICATIONS_GEN_A_CORE
emergency venous access
emergency venous access
monitoring purposes
airway protection
antibiotic therapy/emergency venous access/fluid administration
emergency venous access
antibiotic therapy/emergency venous access/fluid administration

## 2024-12-05 NOTE — PROGRESS NOTE ADULT - SUBJECTIVE AND OBJECTIVE BOX
INTERVAL HPI/OVERNIGHT EVENTS:  Patient S&E at bedside. Overnight she required intubation in the setting of tachypnea and lactic acidosis, and was transferred to MICU.    REVIEW OF SYSTEMS: unable to obtain given mental status    VITAL SIGNS:  T(F): 102.4 (12-05-24 @ 14:00)  HR: 124 (12-05-24 @ 14:00)  BP: 116/91 (12-05-24 @ 05:45)  RR: 29 (12-05-24 @ 14:00)  SpO2: 98% (12-05-24 @ 14:00)  Wt(kg): --    PHYSICAL EXAM:  Constitutional: +intubated and sedated  Eyes: sclera non-icteric  Neck: supple  Respiratory: +ventilator in place  Cardiovascular: RRR  Gastrointestinal: soft, NTND  Extremities: no edema  Neurological: +sedated      MEDICATIONS  (STANDING):  chlorhexidine 0.12% Liquid 15 milliLiter(s) Oral Mucosa every 12 hours  chlorhexidine 2% Cloths 1 Application(s) Topical <User Schedule>  dextrose 5% + sodium chloride 0.45%. 1000 milliLiter(s) (125 mL/Hr) IV Continuous <Continuous>  fentaNYL   Infusion. 0.5 MICROgram(s)/kG/Hr (4.79 mL/Hr) IV Continuous <Continuous>  insulin regular Infusion 3 Unit(s)/Hr (3 mL/Hr) IV Continuous <Continuous>  norepinephrine Infusion 0.05 MICROgram(s)/kG/Min (4.49 mL/Hr) IV Continuous <Continuous>  petrolatum Ophthalmic Ointment 1 Application(s) Both EYES two times a day  phenylephrine    Infusion 0.5 MICROgram(s)/kG/Min (8.98 mL/Hr) IV Continuous <Continuous>  piperacillin/tazobactam IVPB.. 3.375 Gram(s) IV Intermittent every 8 hours  propofol Infusion 20.007 MICROgram(s)/kG/Min (11.5 mL/Hr) IV Continuous <Continuous>  senna 2 Tablet(s) Oral at bedtime  vasopressin Infusion 0.04 Unit(s)/Min (6 mL/Hr) IV Continuous <Continuous>    MEDICATIONS  (PRN):      Allergies    No Known Allergies    Intolerances        LABS:                        7.9    0.38  )-----------( 10       ( 05 Dec 2024 11:53 )             22.2     12-05    150[H]  |  116[H]  |  2[L]  ----------------------------<  113[H]  4.6   |  14[L]  |  0.62    Ca    6.6[L]      05 Dec 2024 11:53  Phos  3.2     12-05  Mg     1.50     12-05    TPro  4.7[L]  /  Alb  1.9[L]  /  TBili  0.7  /  DBili  x   /  AST  77[H]  /  ALT  30  /  AlkPhos  83  12-05    PT/INR - ( 05 Dec 2024 11:53 )   PT: 14.7 sec;   INR: 1.27 ratio         PTT - ( 05 Dec 2024 11:53 )  PTT:34.1 sec  Urinalysis Basic - ( 05 Dec 2024 11:53 )    Color: x / Appearance: x / SG: x / pH: x  Gluc: 113 mg/dL / Ketone: x  / Bili: x / Urobili: x   Blood: x / Protein: x / Nitrite: x   Leuk Esterase: x / RBC: x / WBC x   Sq Epi: x / Non Sq Epi: x / Bacteria: x        RADIOLOGY & ADDITIONAL TESTS:  Studies reviewed.    < from: CT Neck Soft Tissue w/ IV Cont (12.04.24 @ 15:30) >  Exam is severely motion limited. Within these limitations:    Previously demonstrated necrotic jayla mass in the left neck with   increased fluid component and thick nodular rim of enhancement compared   to prior exam on 10/25/2024. Overlying soft tissue edema and skin   thickening suggestive of superinfection of necrotic contents with   suppuration/abscess formation. Recommend clinical correlation and   ultrasound with fluid sampling for further evaluation.    Marked decrease in size of left-sided oropharyngeal mass, not readily   apparent on this motion limited exam.    < end of copied text >

## 2024-12-05 NOTE — PROCEDURE NOTE - ADDITIONAL PROCEDURE DETAILS
Left radial a-line
US guided ACCU-CATH
US guided power-glide IV
Critically ill pt requiring PIV access for medical management and/or pressor support. Under US guidance identified Right Upper Arm Vein, and successfully placed 20g x 10cm ACCUCATH extend dwelling catheter into vessel. Placement confirmed s/p with ultrasound and catheter determined to be in patent lumen of vein. Pt tolerated well w/o complication.
Right IJ  Cleaned with chlorhexidine  Anesthetized with lido  Confirmed with ultrasound  Chest XR after to confirm placement

## 2024-12-05 NOTE — PROGRESS NOTE ADULT - SUBJECTIVE AND OBJECTIVE BOX
INTERVAL HPI/OVERNIGHT EVENTS: Last night, patient's lactate increased to 11.8 and she was tachypneic. Vanc/Zosyn was started and she was intubated with fentanyl 0.5, levophed 0.05, propofol 20, vasopressin 0.02; RR was later increased. Central line and A line were placed. ENT was consulted and she was scoped.    SUBJECTIVE: Patient seen and examined at bedside. She is intubated/sedated and appears comfortable.      VITAL SIGNS:  ICU Vital Signs Last 24 Hrs  T(C): 36.9 (04 Dec 2024 08:00), Max: 37.7 (03 Dec 2024 16:00)  T(F): 98.4 (04 Dec 2024 08:00), Max: 99.9 (03 Dec 2024 16:00)  HR: 122 (04 Dec 2024 08:00) (120 - 129)  BP: 109/53 (04 Dec 2024 08:00) (108/42 - 146/65)  BP(mean): 70 (04 Dec 2024 08:00) (58 - 94)  ABP: --  ABP(mean): --  RR: 23 (04 Dec 2024 08:00) (17 - 28)  SpO2: 100% (04 Dec 2024 08:00) (97% - 100%)    O2 Parameters below as of 04 Dec 2024 08:00  Patient On (Oxygen Delivery Method): nasal cannula  O2 Flow (L/min): 4        Plateau pressure:   P/F ratio:     12-02 @ 07:01  -  12-03 @ 07:00  --------------------------------------------------------  IN: 2392 mL / OUT: 2450 mL / NET: -58 mL        CAPILLARY BLOOD GLUCOSE  POCT Blood Glucose.: 259 mg/dL (03 Dec 2024 07:00)      I&O's Detail    03 Dec 2024 07:01  -  04 Dec 2024 07:00  --------------------------------------------------------  IN:    dextrose 5% + lactated ringers w/ Additives: 300 mL    dextrose 5% + lactated ringers w/ Additives: 150 mL    dextrose 5% + lactated ringers w/ Additives: 3000 mL    Insulin: 166 mL    Insulin: 26 mL    Insulin: 42 mL    Insulin: 24 mL    Insulin: 40 mL    IV PiggyBack: 500 mL    IV PiggyBack: 500 mL    Lactated Ringers Bolus: 1000 mL    Oral Fluid: 800 mL  Total IN: 6548 mL    OUT:    Rectal Tube (mL): 650 mL    Voided (mL): 2400 mL  Total OUT: 3050 mL    Total NET: 3498 mL      04 Dec 2024 07:01  -  04 Dec 2024 08:15  --------------------------------------------------------  IN:    dextrose 5% + lactated ringers w/ Additives: 150 mL    Insulin: 10 mL  Total IN: 160 mL    OUT:  Total OUT: 0 mL    Total NET: 160 mL      ECG: Sinus tach.    PHYSICAL EXAM:  CONSTITUTIONAL: AOx3 but lethargic  EYES: PERRLA and symmetric, EOMI, No conjunctival or scleral injection, non-icteric  NECK: Hyperpigmented, large tender left neck mass  RESP: Tachypneic, no use of accessory muscles; CTA b/l, no WRR  CV: RRR, +S1S2, no MRG; no JVD; no peripheral edema  GI: Soft, NT, ND, no rebound, no guarding; no palpable masses  LYMPH: No cervical LAD or tenderness; no axillary LAD or tenderness; no inguinal LAD or tenderness  MSK: Normal gait; Normal ROM without pain  SKIN: No rashes or ulcers noted; no subcutaneous nodules or induration palpable  PSYCH: Confused but AO x 3    MEDICATIONS  (STANDING):  cefepime   IVPB      cefepime   IVPB 2000 milliGRAM(s) IV Intermittent every 8 hours  chlorhexidine 2% Cloths 1 Application(s) Topical <User Schedule>  dextrose 5% + lactated ringers 1000 milliLiter(s) (150 mL/Hr) IV Continuous <Continuous>  insulin regular Infusion 10 Unit(s)/Hr (10 mL/Hr) IV Continuous <Continuous>    MEDICATIONS  (PRN):  ondansetron Injectable 4 milliGRAM(s) IV Push every 6 hours PRN Nausea and/or Vomiting      ALLERGIES:  Allergies    No Known Allergies    Intolerances        LABS:                            7.3    0.15  )-----------( 15       ( 04 Dec 2024 00:35 )             20.1     12-04    149[H]  |  119[H]  |  4[L]  ----------------------------<  141[H]  4.1   |  16[L]  |  0.41[L]    Ca    7.7[L]      04 Dec 2024 04:10  Phos  2.0     12-04  Mg     1.80     12-04    TPro  5.2[L]  /  Alb  2.0[L]  /  TBili  0.5  /  DBili  x   /  AST  23  /  ALT  17  /  AlkPhos  80  12-04    PT/INR - ( 04 Dec 2024 00:35 )   PT: 13.2 sec;   INR: 1.11 ratio         PTT - ( 04 Dec 2024 00:35 )  PTT:24.7 sec             Urinalysis Basic - ( 03 Dec 2024 04:38 )    Color: x / Appearance: x / SG: x / pH: x  Gluc: 272 mg/dL / Ketone: x  / Bili: x / Urobili: x   Blood: x / Protein: x / Nitrite: x   Leuk Esterase: x / RBC: x / WBC x   Sq Epi: x / Non Sq Epi: x / Bacteria: x        RADIOLOGY & ADDITIONAL TESTS: Reviewed.   INTERVAL HPI/OVERNIGHT EVENTS: Last night, patient's lactate increased to 11.8 and she was tachypneic. Vanc/Zosyn was started and she was intubated with fentanyl 0.5, levophed 0.05, propofol 20, vasopressin 0.02; RR was later increased. Central line and A line were placed. ENT was consulted and she was scoped.    SUBJECTIVE: Patient seen and examined at bedside. She is intubated/sedated and appears comfortable.      VITAL SIGNS:  ICU Vital Signs Last 24 Hrs  T(C): 37.2 (05 Dec 2024 04:00), Max: 37.3 (04 Dec 2024 12:00)  T(F): 99 (05 Dec 2024 04:00), Max: 99.2 (04 Dec 2024 12:00)  HR: 107 (05 Dec 2024 07:24) (107 - 141)  BP: 116/91 (05 Dec 2024 05:45) (76/39 - 205/155)  BP(mean): 101 (05 Dec 2024 05:45) (53 - 171)  ABP: 104/58 (05 Dec 2024 06:00) (80/44 - 207/112)  ABP(mean): 74 (05 Dec 2024 06:00) (58 - 148)  RR: 28 (05 Dec 2024 06:00) (12 - 33)  SpO2: 98% (05 Dec 2024 07:24) (90% - 100%)    O2 Parameters below as of 05 Dec 2024 06:00  Patient On (Oxygen Delivery Method): ventilator    O2 Concentration (%): 30        Plateau pressure:   P/F ratio:     12-02 @ 07:01  -  12-03 @ 07:00  --------------------------------------------------------  IN: 2392 mL / OUT: 2450 mL / NET: -58 mL      CAPILLARY BLOOD GLUCOSE      POCT Blood Glucose.: 187 mg/dL (05 Dec 2024 07:54)  POCT Blood Glucose.: 186 mg/dL (05 Dec 2024 07:06)  POCT Blood Glucose.: 190 mg/dL (05 Dec 2024 06:07)  POCT Blood Glucose.: 220 mg/dL (05 Dec 2024 04:31)  POCT Blood Glucose.: 151 mg/dL (05 Dec 2024 03:44)  POCT Blood Glucose.: 228 mg/dL (05 Dec 2024 02:41)  POCT Blood Glucose.: 179 mg/dL (05 Dec 2024 01:22)  POCT Blood Glucose.: 213 mg/dL (05 Dec 2024 00:15)  POCT Blood Glucose.: 194 mg/dL (04 Dec 2024 23:09)  POCT Blood Glucose.: 198 mg/dL (04 Dec 2024 22:03)  POCT Blood Glucose.: 183 mg/dL (04 Dec 2024 21:05)  POCT Blood Glucose.: 172 mg/dL (04 Dec 2024 20:05)  POCT Blood Glucose.: 159 mg/dL (04 Dec 2024 19:05)  POCT Blood Glucose.: 155 mg/dL (04 Dec 2024 18:03)  POCT Blood Glucose.: 161 mg/dL (04 Dec 2024 17:03)  POCT Blood Glucose.: 153 mg/dL (04 Dec 2024 16:06)  POCT Blood Glucose.: 152 mg/dL (04 Dec 2024 14:20)  POCT Blood Glucose.: 140 mg/dL (04 Dec 2024 13:43)  POCT Blood Glucose.: 104 mg/dL (04 Dec 2024 13:09)  POCT Blood Glucose.: 120 mg/dL (04 Dec 2024 12:07)  POCT Blood Glucose.: 122 mg/dL (04 Dec 2024 11:01)  POCT Blood Glucose.: 128 mg/dL (04 Dec 2024 10:06)  POCT Blood Glucose.: 147 mg/dL (04 Dec 2024 09:03)    I&O's Detail    04 Dec 2024 07:01  -  05 Dec 2024 07:00  --------------------------------------------------------  IN:    dextrose 5% + lactated ringers w/ Additives: 450 mL    FentaNYL: 95.8 mL    FentaNYL: 191.6 mL    Insulin: 32 mL    Insulin: 23 mL    Insulin: 8 mL    IV PiggyBack: 100 mL    IV PiggyBack: 1750 mL    Lactated Ringers Bolus: 1000 mL    Lactated Ringers w/ Additives: 1800 mL    Norepinephrine: 179.6 mL    Norepinephrine: 62.8 mL    Platelets - Single Donor: 161 mL    PRBCs (Packed Red Blood Cells): 310 mL    Propofol: 57.4 mL    Propofol: 114.8 mL    sodium chloride 0.45%: 1000 mL    Sodium Chloride 0.9% Bolus: 500 mL    Vasopressin: 18 mL  Total IN: 7854 mL    OUT:    Indwelling Catheter - Urethral (mL): 2605 mL    Rectal Tube (mL): 400 mL    Voided (mL): 1050 mL  Total OUT: 4055 mL    Total NET: 3799 mL      ECG: Sinus tach.    PHYSICAL EXAM:  CONSTITUTIONAL: Intubated, sedated  EYES: PERRLA and symmetric, EOMI, No conjunctival or scleral injection, non-icteric  NECK: Hyperpigmented, large tender left neck mass  RESP: Tachypneic, no use of accessory muscles; CTA b/l, no WRR  CV: RRR, +S1S2, no MRG; no JVD; no peripheral edema  GI: Soft, NT, ND, no rebound, no guarding; no palpable masses  LYMPH: No cervical LAD or tenderness; no axillary LAD or tenderness; no inguinal LAD or tenderness  SKIN: No rashes or ulcers noted; no subcutaneous nodules or induration palpable    MEDICATIONS  (STANDING):  chlorhexidine 0.12% Liquid 15 milliLiter(s) Oral Mucosa every 12 hours  chlorhexidine 2% Cloths 1 Application(s) Topical <User Schedule>  fentaNYL   Infusion. 0.5 MICROgram(s)/kG/Hr (4.79 mL/Hr) IV Continuous <Continuous>  insulin regular Infusion 3 Unit(s)/Hr (3 mL/Hr) IV Continuous <Continuous>  norepinephrine Infusion 0.05 MICROgram(s)/kG/Min (4.49 mL/Hr) IV Continuous <Continuous>  petrolatum Ophthalmic Ointment 1 Application(s) Both EYES two times a day  piperacillin/tazobactam IVPB.. 3.375 Gram(s) IV Intermittent every 8 hours  propofol Infusion 20.007 MICROgram(s)/kG/Min (11.5 mL/Hr) IV Continuous <Continuous>  sodium chloride 0.45%. 1000 milliLiter(s) (125 mL/Hr) IV Continuous <Continuous>  vasopressin Infusion 0.04 Unit(s)/Min (6 mL/Hr) IV Continuous <Continuous>    MEDICATIONS  (PRN):  acetaminophen     Tablet .. 650 milliGRAM(s) Oral every 6 hours PRN Temp greater or equal to 38C (100.4F), Moderate Pain (4 - 6)  ondansetron Injectable 4 milliGRAM(s) IV Push every 6 hours PRN Nausea and/or Vomiting      ALLERGIES:  No Known Allergies    Intolerances      LABS:                          7.8    0.29  )-----------( 15       ( 05 Dec 2024 05:18 )             22.2     12-05    151[H]  |  118[H]  |  2[L]  ----------------------------<  207[H]  5.3   |  14[L]  |  0.58    Ca    6.6[L]      05 Dec 2024 05:18  Phos  3.7     12-05  Mg     1.90     12-05    TPro  4.6[L]  /  Alb  1.8[L]  /  TBili  0.7  /  DBili  x   /  AST  46[H]  /  ALT  25  /  AlkPhos  84  12-05    PT/INR - ( 05 Dec 2024 05:18 )   PT: 14.4 sec;   INR: 1.24 ratio         PTT - ( 05 Dec 2024 05:18 )  PTT:30.7 sec    ABG - ( 05 Dec 2024 05:18 )  pH, Arterial: 7.26  pH, Blood: x     /  pCO2: 33    /  pO2: 217   / HCO3: 15    / Base Excess: -11.3 /  SaO2: 99.4      Blood Gas Profile - Venous (12.04.24 @ 20:51)    pH, Venous: 7.38: No collection time indicated, please interpret with caution   pCO2, Venous: 25 mmHg   pO2, Venous: 70 mmHg   HCO3, Venous: 15 mmol/L   Base Excess, Venous: -9.1 mmol/L   Oxygen Saturation, Venous: 95.9 %   Total CO2, Venous: 16 mmol/L               Urinalysis Basic - ( 03 Dec 2024 04:38 )    Color: x / Appearance: x / SG: x / pH: x  Gluc: 272 mg/dL / Ketone: x  / Bili: x / Urobili: x   Blood: x / Protein: x / Nitrite: x   Leuk Esterase: x / RBC: x / WBC x   Sq Epi: x / Non Sq Epi: x / Bacteria: x      RADIOLOGY & ADDITIONAL TESTS: Reviewed.    < from: CT Neck Soft Tissue w/ IV Cont (12.04.24 @ 15:30) >  Previously demonstrated necrotic jayla mass in the left neck with   increased fluid component and thick nodular rim of enhancement compared   to prior exam on 10/25/2024. Overlying soft tissue edema and skin   thickening suggestive of superinfection of necrotic contents with   suppuration/abscess formation. Recommend clinical correlation and   ultrasound with fluid sampling for further evaluation.    Marked decrease in size of left-sided oropharyngeal mass, not readily   apparent on this motion limited exam.    < end of copied text >    < from: CT Abdomen and Pelvis w/ IV Cont (12.04.24 @ 15:30) >  Mild perivesicular fat stranding, which may be seen in the setting of   cystitis. Correlate with urinalysis.    Fatty infiltration of the liver.    Mild thickening of the endometrial complex, which measures 12 mm.   Consider further evaluation with pelvic ultrasound.    < end of copied text >    < from: CT Brain Stroke Protocol (12.04.24 @ 15:25) >  No acute intracranial hemorrhage, mass effect, or shift of   the midline structures.    < end of copied text >

## 2024-12-05 NOTE — PROGRESS NOTE ADULT - ASSESSMENT
59 y.o. Female with DMT2 on trulicity/metformin, HTN on amlodipine, and T cell lymphoma on chemo (with outpatient steroid) presents to ED with progressive SOB and weakness for 1 week and no PO intake in the last day. Admitted for DKA, neutropenic fever with sepsis (E.coli bacteremia).      1. DKA- h/o Type 2 DM with original Hba1c of 11.9% on 11/2/24 and now Hba1c is 10.6%   - Blood glucose target is 140 to 180 mg/dl  Being managed in MICU on insulin drip after intubated overnight with increased lactate. BHB checked yesterday 12/4 multiple times wnl and lactate is most likely cause of metabolic acidosis at this time.  - Patient was NPO but new order placed for 18 hour feeds with Ustream glucose support 1.2 to start at 10 cc/hour with goal of 40 cc/hour x 18h  -MICU ordered NPH 10 units q6h which is reasonable please only administer 3 doses per day while feeds are infusing and hold dose while feeds are off.  - Agree with moderate scale q6h with FS q6h  - Hypoglycemia Protocol   - Please follow up BARRY ab; SOHA, Zinc Transporter ab, and IA2-2 specimen received awaiting results   - Will need diabetic teaching including insulin pen teach before discharge; transitions of care pharmacist following     Discharge planning:   - TBD likely basal/bolus vs basal/trulicity/metformin   - Please send Lantus solostar pen and humalog kwikpen as test script to check insurance coverage.  Ok to send with current doses and update prior to d/c  If Lantus not covered- can try alternating with one of following   tresiba/basaglar/toujeo/Levemir  If Humalog not covered- can try alternating with one of following  novolog/apidra/admelog/fiasp  Ensure patient has working glucometer, test strips, lancets, alcohol pads, and BD yaneth pen needles  For severe hypoglycemia: Please prescribeGlucose tablets 4G (take 4 tablets) or 15G tablets for blood sugar less than 70 mG/dL repeat fingerstick in 15 minutes.   Please follow up at [facility] [place]; [id number], [place] [address]; for medicine and endocrine appointment     2. HTN --> hypotension  -BP is at goal with IVFs  -Agree with holding Amlodipine for now  -History of high dose prednisone with chemo prior to admission - would rule out component of adrenal insufficiency - check random cortisol      3. Hyperlipidemia/CV risk  -Lipids were at goal in 11/2024  -Will monitor for now and address statin use as outpatient    D/w MICU team   Patient is high risk and high level decision making, requiring ICU level of care.    Brooklyn Willams MD  Division of Endocrinology  Pager: [phone]    If after 6PM or before 9AM, or on weekends/holidays, please call endocrine answering service for assistance ([phone]).  For nonurgent matters email [email or web address] for assistance.

## 2024-12-05 NOTE — EEG REPORT - NS EEG TEXT BOX
[facility] EPILEPSY CENTER   REPORT OF LONG-TERM VIDEO EEG     [facility]: [address], [place] [address], Ph#: [phone]  [facility]:  [address] [place] [address], Ph#: [phone]  [facility]: [address] [place] [address], Ph#: [phone]    Patient Name: MARY GIBSON  Age and : 59y ([birth year])  MRN #: [id number]  Location: [place] [address]  Referring Physician: Ramón Martinez    Start Time/Date: x:x or 08:00 on   End Time/Date: 08:00 on   Duration:    _____________________________________________________________  STUDY INFORMATION    EEG Recording Technique:  The patient underwent continuous Video-EEG monitoring, using Telemetry System hardware on the XLTek Digital System. EEG and video data were stored on a computer hard drive with important events saved in digital archive files. The material was reviewed by a physician (electroencephalographer / epileptologist) on a daily basis. Armando and seizure detection algorithms were utilized and reviewed. An EEG Technician attended to the patient, and was available throughout daytime work hours.  The epilepsy center neurologist was available in person or on call 24-hours per day.    EEG Placement and Labeling of Electrodes:  The EEG was performed utilizing 20 channel referential EEG connections (coronal over temporal over parasagittal montage) using all standard 10-20 electrode placements with EKG, with additional electrodes placed in the inferior temporal region using the modified 10-10 montage electrode placements for elective admissions, or if deemed necessary. Recording was at a sampling rate of 256 samples per second per channel. Time synchronized digital video recording was done simultaneously with EEG recording. A low light infrared camera was used for low light recording.     _____________________________________________________________  HISTORY    Patient is a 59y old  Female who presents with a chief complaint of DKA, neutropenic fever (05 Dec 2024 12:26)      PERTINENT MEDICATION:  MEDICATIONS  (STANDING):  chlorhexidine 0.12% Liquid 15 milliLiter(s) Oral Mucosa every 12 hours  chlorhexidine 2% Cloths 1 Application(s) Topical <User Schedule>  dextrose 5%. 1000 milliLiter(s) (50 mL/Hr) IV Continuous <Continuous>  dextrose 5%. 1000 milliLiter(s) (100 mL/Hr) IV Continuous <Continuous>  dextrose 50% Injectable 25 Gram(s) IV Push once  dextrose 50% Injectable 12.5 Gram(s) IV Push once  dextrose 50% Injectable 25 Gram(s) IV Push once  dextrose Oral Gel 15 Gram(s) Oral once  fentaNYL   Infusion. 0.5 MICROgram(s)/kG/Hr (4.79 mL/Hr) IV Continuous <Continuous>  glucagon  Injectable 1 milliGRAM(s) IntraMuscular once  insulin lispro (ADMELOG) corrective regimen sliding scale   SubCutaneous every 6 hours  insulin regular Infusion 3 Unit(s)/Hr (3 mL/Hr) IV Continuous <Continuous>  magnesium sulfate  IVPB 2 Gram(s) IV Intermittent every 2 hours  norepinephrine Infusion 0.05 MICROgram(s)/kG/Min (4.49 mL/Hr) IV Continuous <Continuous>  petrolatum Ophthalmic Ointment 1 Application(s) Both EYES two times a day  phenylephrine    Infusion 0.5 MICROgram(s)/kG/Min (8.98 mL/Hr) IV Continuous <Continuous>  piperacillin/tazobactam IVPB.. 3.375 Gram(s) IV Intermittent every 8 hours  propofol Infusion 20.007 MICROgram(s)/kG/Min (11.5 mL/Hr) IV Continuous <Continuous>  senna 2 Tablet(s) Oral at bedtime  sodium chloride 0.45%. 1000 milliLiter(s) (125 mL/Hr) IV Continuous <Continuous>  vasopressin Infusion 0.04 Unit(s)/Min (6 mL/Hr) IV Continuous <Continuous>    _____________________________________________________________  STUDY INTERPRETATION      FINDINGS:      Background:  Continuity: initially continuous. with discontinuous burst-attenuation pattern seen after 02:00 following intubation.  Symmetry: symmetric  PDR: none.  Reactivity: present  Voltage: normal, mostly 20-150uV  Anterior Posterior Gradient: absent.  Other background findings: none  Breach: absent    Background Slowing:  Generalized slowing: polymorphic theta/delta activity during the continuous portion of the recording.  Focal slowing: none was present.    State Changes:   State changes were present prior to intubation, without N2 sleep transients.    Sporadic Epileptiform Discharges:    Abundant 1-2 generalized periodic discharges, at times with triphasic morphology and at other times more epileptiform in appearance.  These resolved after intubation.      Rhythmic and Periodic Patterns (RPPs):  See above.    Electrographic and Electroclinical seizures:  None    Other Clinical Events:  None    Activation Procedures:   -Hyperventilation was not performed.    -Photic stimulation was not performed.    Artifacts:  Intermittent myogenic and movement artifacts were noted.    ECG:  The heart rate on single channel ECG was predominantly between xx BPM.    Summary:  Abnormal EEG in the awake / drowsy / asleep state(s).  1) Moderate generalized background slowing and generalized periodic discharges, at times with triphasic morphology and at other times more epileptiform in appearance, prior to 2:00.  2) Burst-attenuation pattern without epileptiform activity after 2:00.    Clinical Impression:  1) Diffuse or multifocal cerebral dysfunction and increased cortical irritability in the initial part of the recording.  2) Severe diffuse or multifocal cerebral dysfunction in the later part of the recording, likely related to sedating medications.        -------------------------------------------------------------------------------------------------------    Melonie Strickland MD  Director, Epilepsy - [facility]    Questions please call [phone]  After hours (5 PM - 8:30 AM) please call the epilepsy answering service at [phone]       [facility] EPILEPSY CENTER   REPORT OF LONG-TERM VIDEO EEG     [facility]: [address], [place] [address], Ph#: [phone]  [facility]:  [address] [place] [address], Ph#: [phone]  [facility]: [address] [place] [address], Ph#: [phone]    Patient Name: MRAY GIBSON  Age and : 59y ([birth year])  MRN #: [id number]  Location: [place] [address]  Referring Physician: Ramón Martinez    Start Time/Date: x:x or 08:00 on   End Time/Date: 08:00 on   Duration:    _____________________________________________________________  STUDY INFORMATION    EEG Recording Technique:  The patient underwent continuous Video-EEG monitoring, using Telemetry System hardware on the XLTek Digital System. EEG and video data were stored on a computer hard drive with important events saved in digital archive files. The material was reviewed by a physician (electroencephalographer / epileptologist) on a daily basis. Armando and seizure detection algorithms were utilized and reviewed. An EEG Technician attended to the patient, and was available throughout daytime work hours.  The epilepsy center neurologist was available in person or on call 24-hours per day.    EEG Placement and Labeling of Electrodes:  The EEG was performed utilizing 20 channel referential EEG connections (coronal over temporal over parasagittal montage) using all standard 10-20 electrode placements with EKG, with additional electrodes placed in the inferior temporal region using the modified 10-10 montage electrode placements for elective admissions, or if deemed necessary. Recording was at a sampling rate of 256 samples per second per channel. Time synchronized digital video recording was done simultaneously with EEG recording. A low light infrared camera was used for low light recording.     _____________________________________________________________  HISTORY    Patient is a 59y old  Female who presents with a chief complaint of DKA, neutropenic fever (05 Dec 2024 12:26)      PERTINENT MEDICATION:  MEDICATIONS  (STANDING):  chlorhexidine 0.12% Liquid 15 milliLiter(s) Oral Mucosa every 12 hours  chlorhexidine 2% Cloths 1 Application(s) Topical <User Schedule>  dextrose 5%. 1000 milliLiter(s) (50 mL/Hr) IV Continuous <Continuous>  dextrose 5%. 1000 milliLiter(s) (100 mL/Hr) IV Continuous <Continuous>  dextrose 50% Injectable 25 Gram(s) IV Push once  dextrose 50% Injectable 12.5 Gram(s) IV Push once  dextrose 50% Injectable 25 Gram(s) IV Push once  dextrose Oral Gel 15 Gram(s) Oral once  fentaNYL   Infusion. 0.5 MICROgram(s)/kG/Hr (4.79 mL/Hr) IV Continuous <Continuous>  glucagon  Injectable 1 milliGRAM(s) IntraMuscular once  insulin lispro (ADMELOG) corrective regimen sliding scale   SubCutaneous every 6 hours  insulin regular Infusion 3 Unit(s)/Hr (3 mL/Hr) IV Continuous <Continuous>  magnesium sulfate  IVPB 2 Gram(s) IV Intermittent every 2 hours  norepinephrine Infusion 0.05 MICROgram(s)/kG/Min (4.49 mL/Hr) IV Continuous <Continuous>  petrolatum Ophthalmic Ointment 1 Application(s) Both EYES two times a day  phenylephrine    Infusion 0.5 MICROgram(s)/kG/Min (8.98 mL/Hr) IV Continuous <Continuous>  piperacillin/tazobactam IVPB.. 3.375 Gram(s) IV Intermittent every 8 hours  propofol Infusion 20.007 MICROgram(s)/kG/Min (11.5 mL/Hr) IV Continuous <Continuous>  senna 2 Tablet(s) Oral at bedtime  sodium chloride 0.45%. 1000 milliLiter(s) (125 mL/Hr) IV Continuous <Continuous>  vasopressin Infusion 0.04 Unit(s)/Min (6 mL/Hr) IV Continuous <Continuous>    _____________________________________________________________  STUDY INTERPRETATION      FINDINGS:      Background:  Continuity: initially continuous. with discontinuous burst-attenuation pattern seen after 02:00 following intubation.  Symmetry: symmetric  PDR: none.  Reactivity: present  Voltage: normal, mostly 20-150uV  Anterior Posterior Gradient: absent.  Other background findings: none  Breach: absent    Background Slowing:  Generalized slowing: polymorphic theta/delta activity during the continuous portion of the recording.  Focal slowing: none was present.    State Changes:   State changes were present prior to intubation, without N2 sleep transients.    Sporadic Epileptiform Discharges:    Abundant 1-2 generalized periodic discharges, at times with triphasic morphology and at other times more epileptiform in appearance.  These resolved after intubation.      Rhythmic and Periodic Patterns (RPPs):  See above.    Electrographic and Electroclinical seizures:  None    Other Clinical Events:  None    Activation Procedures:   -Hyperventilation was not performed.    -Photic stimulation was not performed.    Artifacts:  Intermittent myogenic and movement artifacts were noted.    ECG:  The heart rate on single channel ECG was predominantly between xx BPM.    Summary:  Abnormal EEG in the awake / drowsy / asleep state(s).  1) Moderate generalized background slowing and generalized periodic discharges, at times with triphasic morphology and at other times more epileptiform in appearance, prior to 2:00.  2) Burst-attenuation pattern without epileptiform activity after 2:00.    Clinical Impression:  1) Diffuse or multifocal cerebral dysfunction and increased cortical irritability in the initial part of the recording.  2) Burst-attenuation pattern in the later part of the recording likely reflects effect of sedating medications.        -------------------------------------------------------------------------------------------------------    Melonie Strickland MD  Director, Epilepsy - [facility]    Questions please call [phone]  After hours (5 PM - 8:30 AM) please call the epilepsy answering service at [phone]

## 2024-12-06 NOTE — CHART NOTE - NSCHARTNOTESELECT_GEN_ALL_CORE
GOC
POCUS/Event Note
Death Note/Event Note
Follow Up/Nutrition Services
POCUS
POCUS/Event Note
POCUS/Event Note

## 2024-12-06 NOTE — PHARMACOTHERAPY INTERVENTION NOTE - PROVIDER CONTACTED
MICU team
Endocrine NP
Ears: no ear pain and no hearing problems. Nose: no nasal congestion and no nasal drainage. Mouth/Throat: no dysphagia, no hoarseness and no throat pain. Neck: no lumps, no pain, no stiffness and no swollen glands.

## 2024-12-06 NOTE — PROGRESS NOTE ADULT - SUBJECTIVE AND OBJECTIVE BOX
59yPatient is a 59y old  Female who presents with a chief complaint of DKA, neutropenic fever (06 Dec 2024 12:42)      Interval history:  febrile, on 3 pressors, not much secretions per RN, + diarrhea.       Allergies:   No Known Allergies    Antimicrobials:  meropenem  IVPB 2000 milliGRAM(s) IV Intermittent every 8 hours      REVIEW OF SYSTEMS:  Unable to obtain as patient intubated and sedated.       Vital Signs Last 24 Hrs  T(C): 38.3 (12-06-24 @ 08:00), Max: 39.1 (12-05-24 @ 16:00)  T(F): 100.9 (12-06-24 @ 08:00), Max: 102.4 (12-05-24 @ 16:00)  HR: 146 (12-06-24 @ 14:49) (121 - 159)  BP: --  BP(mean): --  RR: 0 (12-06-24 @ 14:43) (0 - 30)  SpO2: 100% (12-06-24 @ 14:49) (98% - 100%)      PHYSICAL EXAM:  Pt intubated   lt neck mass and swelling   + air entry  non distended abdomen  + rodríguez   no phlebitis                             7.9    0.64  )-----------( 50       ( 06 Dec 2024 12:45 )             22.5   12-06    142  |  104  |  2[L]  ----------------------------<  131[H]  4.3   |  7[LL]  |  0.92    Ca    7.3[L]      06 Dec 2024 12:45  Phos  4.0     12-06  Mg     2.10     12-06    TPro  5.0[L]  /  Alb  1.9[L]  /  TBili  0.5  /  DBili  x   /  AST  <5  /  ALT  <5  /  AlkPhos  113  12-06      LIVER FUNCTIONS - ( 06 Dec 2024 12:45 )  Alb: 1.9 g/dL / Pro: 5.0 g/dL / ALK PHOS: 113 U/L / ALT: <5 U/L / AST: <5 U/L / GGT: x               Culture - Abscess with Gram Stain (collected 05 Dec 2024 12:18)  Source: .Abscess  Gram Stain (06 Dec 2024 01:15):    Rare polymorphonuclear leukocytes seen per low power field    No organisms seen per oil power field    Culture - Sputum (collected 05 Dec 2024 09:56)  Source: ET Tube ET Tube  Gram Stain (05 Dec 2024 21:20):    Few polymorphonuclear leukocytes per low power field    No Squamous epithelial cells per low power field    No organisms seen per oil power field  Preliminary Report (06 Dec 2024 08:04):    No growth to date.    Urinalysis with Rflx Culture (collected 04 Dec 2024 17:00)    Culture - Stool (collected 04 Dec 2024 00:46)  Source: .Stool  Final Report (06 Dec 2024 13:33):    No enteric pathogens isolated.    (Stool culture examined for Salmonella,    Shigella, Campylobacter, Aeromonas, Plesiomonas,    Vibrio, E.coli O157 and Yersinia)        Radiology:    < from: Xray Chest 1 View- PORTABLE-Urgent (Xray Chest 1 View- PORTABLE-Urgent .) (12.06.24 @ 12:58) >  IMPRESSION: Clear lungs.

## 2024-12-06 NOTE — INITIAL ORGAN DONATION REFERRAL - NSORGANDONATIONCLINICALTRIGGER_GEN_ALL_CORE
[place] Coma Scale is less than or equal to 5/Family discussion withdrawal of life-sustaining therapies is anticipated

## 2024-12-06 NOTE — PROGRESS NOTE ADULT - ASSESSMENT
59y Female with DMT2 on trulicity/metformin, HTN on amlodipine, and lymphoma on chemo presents to ED with progressive SOB and weakness for 1 week and no PO intake in the last day, found to be in DKA and septic iso pancytopenia. Patient was intubated iso worsening lactic acidosis and tachypnea and AMS. Found to have necrotic superinfected mass of L neck, evaluated by ENT for possible drainage.    NEURO:  #AMS  #Left-sided facial twitching  -Likely iso DKA, sepsis  -AOx2  -Treat underlying DKA and infection  -CTH 12/4 negative for acute infarct, hemorrhage, mass effect  -vEEG to evaluate for seizure activity showed some epileptiform activity and slow waves     #Sedation  -Fentanyl 0.5, levophed 0.05, propofol 20, vasopressin 0.02  -RASS 5, goal -4    #Eye care  -Lacri-lube    ENT:  #Necrotic neck mass  -CT scan with evidence of superinfection/abscess  -S/p ENT needle aspiration - culture pending    PULM:  #Hypoxic respiratory failure  -Unclear atelectasis vs infection  -CXR and CTA clear with no evidence of PE  -Intubated on 12/5    CARDIOVASCULAR:  #HTN  #Dehydration - IVC 1.5  #Sinus Tachycardia  -Monitor off home amlodipine iso sepsis  -Fluid bolus prn    ENDOCRINE:  #DKA  #T2DM  -On admission, serum glucose 554. Anion gap 35. Blood lactate 4.1. UA positive for glucosuria and ketones.  -S/p Insulin drip and fluids with K  -Monitor BMP and BHB q4h  -A1C 12,2,  -->  581  -F/u islet antibody and anti-SOHA ab to evaluate for autoimmune diabetes  -Hold home trulicity and metformin while inpatient  -Endocrinology recs appreciated  -Planned to transition to NPH and basal bolus on 12/5 but OG tube started bleeding      HEME/ONC:  #T-cell lymphoma  -Monitor TLS labs daily  -Heme/onc consult appreciated  -Follows with Dr. Womack for outpatient heme-onc; last chemo on 11/25 with Brentuximab/Doxorubicin/Cytoxan/onpro    #Pancytopenic - likely 2/2 chemo vs infection  -S/p 1 unit pRBCs x3 and 1 unit platelets x2  -Hgb >7  -Plt >10  -Patient has epistaxis with clots  -Fibrinogen 604, D-dimer 6727  -Monitor CBC  -Heme onc consult appreciated    GI  #Diarrhea  -Brown colored diarrhea noted today  -C. diff and GI PCR negative  -F/u stool culture  -Fluid replacement prn    #Diet  -OG tube placed  -Appreciate nutrition recs for tube feeds  -Bowel regimen: senna at bedtime  -PPI     :  #Thickened endometrium on CT pelvis  Confirmed on pelvic ultrasound    RENAL:  #Hypocalcemia  #Hypomagnesemia  #Hypernatremia  -Replete electrolytes  -Monitor BMP q4h  -Start free water via OG tube when used  -Trend ionized Ca daily  -Bicarb drip at 150/hr for lactate >17  -Not a CRRT candidate    ID:  #Sepsis  #Neutropenic fever  -E. Coli bacteremia (pan sensitive), may be iso suppurative neck abscess vs GI illness given diarrhea vs unknown source  -Uptrending lactic acidosis  -F/u repeat Bcx and sputum culture  -S/p cefepime 2g q8h --> changed to CTX 2g q24h --> changed to Vanc/Zosyn --> changed to meropenem  -ID recs appreciated    PROPHYLAXIS:  -Hold heparin iso thrombocytopenia    LINES/TUBES:  -L and R upper arm peripheral IV  -Central line - R IJ  -A line - L radial  -Meyer  -Rectal tube    ETHICS:  Code: DNR

## 2024-12-06 NOTE — PROGRESS NOTE ADULT - SUBJECTIVE AND OBJECTIVE BOX
INTERVAL HPI/OVERNIGHT EVENTS: Last night, patient's lactate increased to >17. Bicarb drip started. Patient made DNR. Continued to spike fevers, meropenem started.     SUBJECTIVE: Patient seen and examined at bedside. She is intubated/sedated and appears comfortable.      VITAL SIGNS:  ICU Vital Signs Last 24 Hrs  T(C): 37.7 (06 Dec 2024 04:00), Max: 39.1 (05 Dec 2024 14:00)  T(F): 99.9 (06 Dec 2024 04:00), Max: 102.4 (05 Dec 2024 14:00)  HR: 139 (06 Dec 2024 07:33) (109 - 145)  BP: --  BP(mean): --  ABP: 98/52 (06 Dec 2024 07:00) (73/43 - 111/75)  ABP(mean): 68 (06 Dec 2024 07:00) (50 - 88)  RR: 28 (06 Dec 2024 07:00) (12 - 30)  SpO2: 100% (06 Dec 2024 07:33) (97% - 100%)    O2 Parameters below as of 06 Dec 2024 04:00  Patient On (Oxygen Delivery Method): ventilator    O2 Concentration (%): 30        Plateau pressure:   P/F ratio:     12-02 @ 07:01  -  12-03 @ 07:00  --------------------------------------------------------  IN: 2392 mL / OUT: 2450 mL / NET: -58 mL    CAPILLARY BLOOD GLUCOSE      POCT Blood Glucose.: 152 mg/dL (06 Dec 2024 06:53)  POCT Blood Glucose.: 163 mg/dL (06 Dec 2024 06:04)  POCT Blood Glucose.: 166 mg/dL (06 Dec 2024 05:09)  POCT Blood Glucose.: 144 mg/dL (06 Dec 2024 04:05)  POCT Blood Glucose.: 148 mg/dL (06 Dec 2024 03:19)  POCT Blood Glucose.: 154 mg/dL (06 Dec 2024 02:03)  POCT Blood Glucose.: 137 mg/dL (06 Dec 2024 01:09)  POCT Blood Glucose.: 130 mg/dL (06 Dec 2024 00:15)  POCT Blood Glucose.: 137 mg/dL (05 Dec 2024 23:05)  POCT Blood Glucose.: 161 mg/dL (05 Dec 2024 22:10)  POCT Blood Glucose.: 149 mg/dL (05 Dec 2024 21:04)  POCT Blood Glucose.: 160 mg/dL (05 Dec 2024 20:00)  POCT Blood Glucose.: 129 mg/dL (05 Dec 2024 19:09)  POCT Blood Glucose.: 135 mg/dL (05 Dec 2024 17:58)  POCT Blood Glucose.: 119 mg/dL (05 Dec 2024 17:03)  POCT Blood Glucose.: 115 mg/dL (05 Dec 2024 16:13)  POCT Blood Glucose.: 103 mg/dL (05 Dec 2024 15:13)  POCT Blood Glucose.: 111 mg/dL (05 Dec 2024 13:57)  POCT Blood Glucose.: 104 mg/dL (05 Dec 2024 13:26)  POCT Blood Glucose.: 124 mg/dL (05 Dec 2024 11:52)  POCT Blood Glucose.: 134 mg/dL (05 Dec 2024 11:07)  POCT Blood Glucose.: 149 mg/dL (05 Dec 2024 10:10)  POCT Blood Glucose.: 162 mg/dL (05 Dec 2024 09:20)  POCT Blood Glucose.: 187 mg/dL (05 Dec 2024 07:54)    I&O's Detail    05 Dec 2024 07:01  -  06 Dec 2024 07:00  --------------------------------------------------------  IN:    dextrose 5% + sodium chloride 0.45%: 1350 mL    FentaNYL: 454.7 mL    FentaNYL: 355 mL    Insulin: 15 mL    Insulin: 12 mL    IV PiggyBack: 100 mL    IV PiggyBack: 950 mL    Lactated Ringers Bolus: 1000 mL    Norepinephrine: 1139.6 mL    Phenylephrine: 986 mL    Platelets - Single Donor: 528 mL    Propofol: 275.9 mL    Propofol: 201 mL    Sodium Bicarbonate: 300 mL    Sodium Bicarbonate: 1050 mL    sodium chloride 0.45%: 500 mL    Vasopressin: 81 mL    Vasopressin: 99 mL  Total IN: 9397.2 mL    OUT:    Indwelling Catheter - Urethral (mL): 4185 mL    Rectal Tube (mL): 100 mL  Total OUT: 4285 mL    Total NET: 5112.2 mL      ECG: Sinus tach.    PHYSICAL EXAM:  CONSTITUTIONAL: Intubated, sedated  EYES: PERRLA and symmetric, EOMI, No conjunctival or scleral injection, non-icteric  NECK: Hyperpigmented, large tender left neck mass  RESP: Tachypneic, no use of accessory muscles; CTA b/l, no WRR  CV: RRR, +S1S2, no MRG; no JVD; no peripheral edema  GI: Soft, NT, ND, no rebound, no guarding; no palpable masses  LYMPH: No cervical LAD or tenderness; no axillary LAD or tenderness; no inguinal LAD or tenderness  SKIN: No rashes or ulcers noted; no subcutaneous nodules or induration palpable    MEDICATIONS  (STANDING):  chlorhexidine 0.12% Liquid 15 milliLiter(s) Oral Mucosa every 12 hours  chlorhexidine 2% Cloths 1 Application(s) Topical <User Schedule>  fentaNYL   Infusion. 0.5 MICROgram(s)/kG/Hr (4.79 mL/Hr) IV Continuous <Continuous>  insulin regular Infusion 1 Unit(s)/Hr (1 mL/Hr) IV Continuous <Continuous>  meropenem  IVPB 1000 milliGRAM(s) IV Intermittent every 8 hours  norepinephrine Infusion 0.05 MICROgram(s)/kG/Min (4.49 mL/Hr) IV Continuous <Continuous>  pantoprazole  Injectable 40 milliGRAM(s) IV Push two times a day  petrolatum Ophthalmic Ointment 1 Application(s) Both EYES two times a day  phenylephrine    Infusion 0.5 MICROgram(s)/kG/Min (8.98 mL/Hr) IV Continuous <Continuous>  propofol Infusion 20.007 MICROgram(s)/kG/Min (11.5 mL/Hr) IV Continuous <Continuous>  sodium bicarbonate  Infusion 0.235 mEq/kG/Hr (150 mL/Hr) IV Continuous <Continuous>  vasopressin Infusion 0.06 Unit(s)/Min (9 mL/Hr) IV Continuous <Continuous>    MEDICATIONS  (PRN):      ALLERGIES:  No Known Allergies    Intolerances      LABS:                                     8.0    0.61  )-----------( 69       ( 06 Dec 2024 04:05 )             22.5     12-06    144  |  106  |  2[L]  ----------------------------<  141[H]  5.1   |  7[LL]  |  0.89    Ca    7.1[L]      06 Dec 2024 04:05  Phos  4.2     12-06  Mg     1.90     12-06    TPro  4.9[L]  /  Alb  1.8[L]  /  TBili  0.5  /  DBili  x   /  AST  <5  /  ALT  <5  /  AlkPhos  104  12-06    PT/INR - ( 06 Dec 2024 00:10 )   PT: 13.8 sec;   INR: 1.19 ratio         PTT - ( 06 Dec 2024 00:10 )  PTT:37.2 sec    ABG - ( 06 Dec 2024 04:05 )  pH, Arterial: 7.13  pH, Blood: x     /  pCO2: 25    /  pO2: 152   / HCO3: 8     / Base Excess: -19.2 /  SaO2: 99.8      Blood Gas Profile - Venous (12.04.24 @ 20:51)    pH, Venous: 7.38: No collection time indicated, please interpret with caution   pCO2, Venous: 25 mmHg   pO2, Venous: 70 mmHg   HCO3, Venous: 15 mmol/L   Base Excess, Venous: -9.1 mmol/L   Oxygen Saturation, Venous: 95.9 %   Total CO2, Venous: 16 mmol/L               Urinalysis Basic - ( 03 Dec 2024 04:38 )    Color: x / Appearance: x / SG: x / pH: x  Gluc: 272 mg/dL / Ketone: x  / Bili: x / Urobili: x   Blood: x / Protein: x / Nitrite: x   Leuk Esterase: x / RBC: x / WBC x   Sq Epi: x / Non Sq Epi: x / Bacteria: x    Culture - Abscess with Gram Stain (12.05.24 @ 12:18)    Gram Stain:   Rare polymorphonuclear leukocytes seen per low power field  No organisms seen per oil power field   Specimen Source: .Abscess      RADIOLOGY & ADDITIONAL TESTS: Reviewed.    < from: CT Neck Soft Tissue w/ IV Cont (12.04.24 @ 15:30) >  Previously demonstrated necrotic jayla mass in the left neck with   increased fluid component and thick nodular rim of enhancement compared   to prior exam on 10/25/2024. Overlying soft tissue edema and skin   thickening suggestive of superinfection of necrotic contents with   suppuration/abscess formation. Recommend clinical correlation and   ultrasound with fluid sampling for further evaluation.    Marked decrease in size of left-sided oropharyngeal mass, not readily   apparent on this motion limited exam.    < end of copied text >    < from: CT Abdomen and Pelvis w/ IV Cont (12.04.24 @ 15:30) >  Mild perivesicular fat stranding, which may be seen in the setting of   cystitis. Correlate with urinalysis.    Fatty infiltration of the liver.    Mild thickening of the endometrial complex, which measures 12 mm.   Consider further evaluation with pelvic ultrasound.    < end of copied text >    < from: CT Brain Stroke Protocol (12.04.24 @ 15:25) >  No acute intracranial hemorrhage, mass effect, or shift of   the midline structures.    < end of copied text >

## 2024-12-06 NOTE — PROGRESS NOTE ADULT - PROVIDER SPECIALTY LIST ADULT
Heme/Onc
Neurology
ENT
Endocrinology
Heme/Onc
Heme/Onc
MICU
Endocrinology
Infectious Disease
MICU
Endocrinology

## 2024-12-06 NOTE — CHART NOTE - NSCHARTNOTEFT_GEN_A_CORE
: Lauri    INDICATION: shock, acute hypox RF    PROCEDURE:  [x] LIMITED ECHO  [ x] LIMITED CHEST  [ ] LIMITED RETROPERITONEAL  [ ] LIMITED ABDOMINAL  [ ] LIMITED DVT  [ ] NEEDLE GUIDANCE VASCULAR  [ ] NEEDLE GUIDANCE THORACENTESIS  [ ] NEEDLE GUIDANCE PARACENTESIS  [ ] NEEDLE GUIDANCE PERICARDIOCENTESIS  [ ] OTHER    FINDINGS:  Tchycardic -- difficult study  normal LV fxn.  IVC 1.34cm.  Daily pattern bilat    INTERPRETATION:  Normal cardiac fxn, although difficult study.  Alines bilat.    Images stored on [employer]. : Lauri    INDICATION: shock, acute hypox RF    PROCEDURE:  [x] LIMITED ECHO  [ x] LIMITED CHEST  [ ] LIMITED RETROPERITONEAL  [ ] LIMITED ABDOMINAL  [ ] LIMITED DVT  [ ] NEEDLE GUIDANCE VASCULAR  [ ] NEEDLE GUIDANCE THORACENTESIS  [ ] NEEDLE GUIDANCE PARACENTESIS  [ ] NEEDLE GUIDANCE PERICARDIOCENTESIS  [ ] OTHER    FINDINGS:  Tachycardic -- difficult study  normal LV fxn.  IVC 1.34cm.  [place] pattern bilat    INTERPRETATION:  Normal cardiac fxn, although difficult study.  Alines bilat.    Images stored on [employer].    Attending Attestation:  I was present during the key portions of the procedure and immediately available during the entire procedure.  I have personally reviewed the images and agree with the interpretation above by the resident/fellow/ACP.    Eder Gaxiola MD  Attending  Pulmonary & Critical Care Medicine

## 2024-12-06 NOTE — PHARMACOTHERAPY INTERVENTION NOTE - COMMENTS
58 y/o female presenting with SOB, weakness x 1 week, admitted to the MICU with DKA, neutropenia.    As discussed with the MICU team, the following recommendations and/or medication order adjustments have been implemented:    1.  Recommend adjusting to cefepime 2g Q8H, normal renal function  2.  Recommend repletion magnesium 2g IV x 1 dose  3.  Recommend repletion potassium IV 10 mEq x3 doses  4.  Recommend repletion potassium PO 40 mEq x 1 dose    Varun Allan, PharmD, [facility]  Clinical Pharmacy Specialist - MICU  Aiefhxy - [phone]
58 y/o female presenting with SOB, weakness x 1 week, admitted to the MICU with DKA, neutropenia.    As discussed with the MICU team, the following recommendations and/or medication order adjustments have been implemented:    1.  Recommend calcium gluconate 2g IVPB x 1 dose for repletion  2.  Recommend daily ionized calcium with AM labs    Varun Allan, PharmD, BCCCP  Clinical Pharmacy Specialist - MICU  [facility] - [phone]
60 y/o female presenting with SOB, weakness x 1 week, admitted to the MICU with DKA, neutropenia.    As discussed with the MICU team, the following recommendations and/or medication order adjustments have been implemented:    1.  Recommend magnesium 2g IV x 2 doses for repletion  2.  Recommend potassium phosphate 30 mmol IV x 1 dose for repletion    Varun Allan, PharmD, [facility]  Clinical Pharmacy Specialist - MICU  Jyi[facility]w - [id number]
Attempted diabetes education with patient (minimally responsive, able to say yes and no) - will have to return later when she is feeling better. Tried to review A1c, her need for insulin on discharge, importance of monitoring her blood sugar, and goal blood sugar. Patient will benefit from continued education once she is feeling better. 
58 y/o female presenting with SOB, weakness x 1 week, admitted to the MICU with DKA, neutropenia.    As discussed with the MICU team, the following recommendations and/or medication order adjustments have been implemented:    1.  Recommend changing meropenem to 2g Q8H, meningeal coverage desired as discussed  2.  Recommend magnesium 2g IV x1 dose    Varun Allan, PharmD, [facility]  Clinical Pharmacy Specialist - MICU  [facility] - [phone]

## 2024-12-06 NOTE — EEG REPORT - NS EEG TEXT BOX
MARY GIBSON [id number]     Study Date: 		12-05-24 1328 to 12/06/2024 0800  Duration: 18 hours and 30 minutes  --------------------------------------------------------------------------------------------------  History:  CC/ HPI Patient is a 59y old  Female who presents with a chief complaint of DKA, neutropenic fever (06 Dec 2024 07:12)    MEDICATIONS  (STANDING):    fentaNYL   Infusion. 0.5 MICROgram(s)/kG/Hr (4.79 mL/Hr) IV Continuous <Continuous>  meropenem  IVPB 2000 milliGRAM(s) IV Intermittent every 8 hours  propofol Infusion 20.007 MICROgram(s)/kG/Min (11.5 mL/Hr) IV Continuous <Continuous>      --------------------------------------------------------------------------------------------------  Study Interpretation:    [[[Abbreviation Key:  PDR=alpha rhythm/posterior dominant rhythm. A-P=anterior posterior gradient.  Amplitude: ‘very low’:<20; ‘low’:20-50; ‘medium’:; ‘high’:>200uV.  Persistence for periodic/rhythmic patterns (% of epoch) ‘rare’:<1%; ‘occasional’:1-10%; ‘frequent’:10-50%; ‘abundant’:50-90%; ‘continuous’:>90%.  Persistence for sporadic discharges: ‘rare’:<1/hr; ‘occasional’:1/min-1/hr; ‘frequent’:>1/min; ‘abundant’:>1/10 sec.  GRDA=generalized rhythmic delta activity; FIRDA=frontal intermittent GRDA; LRDA=lateralized rhythmic delta activity; TIRDA=temporal intermittent rhythmic delta activity;  LPD=PLED=lateralized periodic discharges; GPD=generalized periodic discharges; BiPDs=BiPLEDs=bilateral independent periodic epileptiform discharges; SIRPID=stimulus induced rhythmic, periodic, or ictal appearing discharges; BIRDs=brief potentially ictal rhythmic discharges >4 Hz, lasting .5-10s; PFA=paroxysmal bursts of beta/gamma; LVFA=low voltage fast activity.  Modifiers: +F=with fast component; +S=with spike component; +R=with rhythmic component.  S-B=burst suppression pattern.  Max=maximal. N1-drowsy; N2-stage II sleep; N3-slow wave sleep. SSS/BETS=small sharp spikes/benign epileptiform transients of sleep. HV=hyperventilation; PS=photic stimulation]]]    Daily EEG Visual Analysis    FINDINGS:      Background:  Continuity: discontinuous  Symmetry: symmetric  PDR: absent  Reactivity: present  Voltage: burst suppression with normal voltage during bursts, mostly 20-150uV  Anterior Posterior Gradient: absent  Other background findings: none  Breach: absent    Background Slowing:  Generalized slowing: theta>delta slowing   Focal slowing: none was present.    State Changes:   -Absent    Sporadic Epileptiform Discharges:    Intermittent generalized spike discharges, at times fragmented, with vertex and left frontal fragmented spikes favored.     Rhythmic and Periodic Patterns (RPPs):    Electrographic and Electroclinical seizures:  None    Other Clinical Events:  None    Activation Procedures:   -Hyperventilation was not performed.    -Photic stimulation was not performed.    Artifacts:  Intermittent myogenic and movement artifacts were noted.    ECG:  The heart rate on single channel ECG was predominantly between 110-130 BPM.    EEG Classification / Summary:  Abnormal  EEG in the sedated state  1. Intermittent generalized spike discharges, at times fragmented, with vertex and L frontal fragmented spike/wave complexes favored  2. Generalized background slowing, severe     Clinical Impression:  1. Risk of generalized and partial-onset seizures; additionally, such findings can be seen in post-anoxic cerebral injury  2. Severe diffuse cerebral dysfunction; likely in the setting of chemical sedation     *PRELIM READ, ATTENDING REVIEW PENDING*         -------------------------------------------------------------------------------------------------------  [facility] EEG Reading Room Ph#: [phone]  Epilepsy Answering Service after 5PM and before 8:30AM: Ph#: [phone]    Vince Jamison, DO   Epilepsy Fellow    MARY GIBSON [id number]     Study Date: 		12-05-24 1328 to 12/06/2024 0800  Duration: 18 hours and 30 minutes  --------------------------------------------------------------------------------------------------  History:  CC/ HPI Patient is a 59y old  Female who presents with a chief complaint of DKA, neutropenic fever (06 Dec 2024 07:12)    MEDICATIONS  (STANDING):    fentaNYL   Infusion. 0.5 MICROgram(s)/kG/Hr (4.79 mL/Hr) IV Continuous <Continuous>  meropenem  IVPB 2000 milliGRAM(s) IV Intermittent every 8 hours  propofol Infusion 20.007 MICROgram(s)/kG/Min (11.5 mL/Hr) IV Continuous <Continuous>      --------------------------------------------------------------------------------------------------  Study Interpretation:    [[[Abbreviation Key:  PDR=alpha rhythm/posterior dominant rhythm. A-P=anterior posterior gradient.  Amplitude: ‘very low’:<20; ‘low’:20-50; ‘medium’:; ‘high’:>200uV.  Persistence for periodic/rhythmic patterns (% of epoch) ‘rare’:<1%; ‘occasional’:1-10%; ‘frequent’:10-50%; ‘abundant’:50-90%; ‘continuous’:>90%.  Persistence for sporadic discharges: ‘rare’:<1/hr; ‘occasional’:1/min-1/hr; ‘frequent’:>1/min; ‘abundant’:>1/10 sec.  GRDA=generalized rhythmic delta activity; FIRDA=frontal intermittent GRDA; LRDA=lateralized rhythmic delta activity; TIRDA=temporal intermittent rhythmic delta activity;  LPD=PLED=lateralized periodic discharges; GPD=generalized periodic discharges; BiPDs=BiPLEDs=bilateral independent periodic epileptiform discharges; SIRPID=stimulus induced rhythmic, periodic, or ictal appearing discharges; BIRDs=brief potentially ictal rhythmic discharges >4 Hz, lasting .5-10s; PFA=paroxysmal bursts of beta/gamma; LVFA=low voltage fast activity.  Modifiers: +F=with fast component; +S=with spike component; +R=with rhythmic component.  S-B=burst suppression pattern.  Max=maximal. N1-drowsy; N2-stage II sleep; N3-slow wave sleep. SSS/BETS=small sharp spikes/benign epileptiform transients of sleep. HV=hyperventilation; PS=photic stimulation]]]    Daily EEG Visual Analysis    FINDINGS:      Background:  Continuity: discontinuous, burst-suppression pattern, 1-3 seconds of suppression with 5-7 seconds burst  Symmetry: symmetric  PDR: absent  Reactivity: present  Voltage: burst suppression with normal voltage during bursts, mostly 20-150uV  Anterior Posterior Gradient: absent  Other background findings: none  Breach: absent    Background Slowing:  Generalized slowing: theta>delta slowing   Focal slowing: none was present.    State Changes:   -Absent    Sporadic Epileptiform Discharges:    Intermittent generalized spike discharges, at times fragmented, with vertex and left frontocentral fragmented spikes favored.     Rhythmic and Periodic Patterns (RPPs):    Electrographic and Electroclinical seizures:  None    Other Clinical Events:  None    Activation Procedures:   -Hyperventilation was not performed.    -Photic stimulation was not performed.    Artifacts:  Intermittent myogenic and movement artifacts were noted.    ECG:  The heart rate on single channel ECG was predominantly between 110-130 BPM.    EEG Classification / Summary:  Abnormal  EEG in the sedated state  1. Intermittent generalized spike discharges, at times fragmented, with vertex and L fronto-central fragmented spike/wave complexes favored  2. Generalized background slowing, severe     Clinical Impression:  1. Risk of generalized and partial-onset seizures; additionally, such findings can be seen in post-anoxic cerebral injury  2. Severe diffuse cerebral dysfunction; likely in the setting of chemical sedation     *PRELIM READ, ATTENDING REVIEW PENDING*         -------------------------------------------------------------------------------------------------------  [facility] EEG Reading Room Ph#: [phone]  Epilepsy Answering Service after 5PM and before 8:30AM: Ph#: [phone]    Vince Jamison DO   Epilepsy Fellow    MARY GIBSON [id number]     Study Date: 		12-05-24 1328 to 12/06/2024 0800  Duration: 18 hours and 30 minutes  --------------------------------------------------------------------------------------------------  History:  CC/ HPI Patient is a 59y old  Female who presents with a chief complaint of DKA, neutropenic fever (06 Dec 2024 07:12)    MEDICATIONS  (STANDING):    fentaNYL   Infusion. 0.5 MICROgram(s)/kG/Hr (4.79 mL/Hr) IV Continuous <Continuous>  meropenem  IVPB 2000 milliGRAM(s) IV Intermittent every 8 hours  propofol Infusion 20.007 MICROgram(s)/kG/Min (11.5 mL/Hr) IV Continuous <Continuous>      --------------------------------------------------------------------------------------------------  Study Interpretation:    [[[Abbreviation Key:  PDR=alpha rhythm/posterior dominant rhythm. A-P=anterior posterior gradient.  Amplitude: ‘very low’:<20; ‘low’:20-50; ‘medium’:; ‘high’:>200uV.  Persistence for periodic/rhythmic patterns (% of epoch) ‘rare’:<1%; ‘occasional’:1-10%; ‘frequent’:10-50%; ‘abundant’:50-90%; ‘continuous’:>90%.  Persistence for sporadic discharges: ‘rare’:<1/hr; ‘occasional’:1/min-1/hr; ‘frequent’:>1/min; ‘abundant’:>1/10 sec.  GRDA=generalized rhythmic delta activity; FIRDA=frontal intermittent GRDA; LRDA=lateralized rhythmic delta activity; TIRDA=temporal intermittent rhythmic delta activity;  LPD=PLED=lateralized periodic discharges; GPD=generalized periodic discharges; BiPDs=BiPLEDs=bilateral independent periodic epileptiform discharges; SIRPID=stimulus induced rhythmic, periodic, or ictal appearing discharges; BIRDs=brief potentially ictal rhythmic discharges >4 Hz, lasting .5-10s; PFA=paroxysmal bursts of beta/gamma; LVFA=low voltage fast activity.  Modifiers: +F=with fast component; +S=with spike component; +R=with rhythmic component.  S-B=burst suppression pattern.  Max=maximal. N1-drowsy; N2-stage II sleep; N3-slow wave sleep. SSS/BETS=small sharp spikes/benign epileptiform transients of sleep. HV=hyperventilation; PS=photic stimulation]]]    Daily EEG Visual Analysis    FINDINGS:      Background:  Continuity: discontinuous, burst-suppression pattern, 1-3 seconds of suppression with 5-7 seconds burst  Symmetry: symmetric  PDR: absent  Reactivity: present  Voltage: burst suppression with normal voltage during bursts, mostly 20-150uV  Anterior Posterior Gradient: absent  Other background findings: none  Breach: absent    Background Slowing:  Generalized slowing: theta>delta slowing   Focal slowing: none was present.    State Changes:   -Absent    Sporadic Epileptiform Discharges:    Intermittent generalized spike discharges, at times fragmented, with vertex and left frontocentral maximum      Rhythmic and Periodic Patterns (RPPs):    Electrographic and Electroclinical seizures:  None    Other Clinical Events:  None    Activation Procedures:   -Hyperventilation was not performed.    -Photic stimulation was not performed.    Artifacts:  Intermittent myogenic and movement artifacts were noted.    ECG:  The heart rate on single channel ECG was predominantly between 110-130 BPM.    EEG Classification / Summary:  Abnormal  EEG in the sedated state  1. Intermittent generalized spike discharges, at times fragmented  2. Generalized background slowing, severe     Clinical Impression:  1. Risk of seizures with generalized-onset.   2. Severe diffuse cerebral dysfunction.    -------------------------------------------------------------------------------------------------------  [facility] EEG Reading Room Ph#: [phone]  Epilepsy Answering Service after 5PM and before 8:30AM: Ph#: [phone]    Vince Jamison DO   Epilepsy Fellow     Harish Morgan MD  Neurology Attending Physician

## 2024-12-06 NOTE — PROGRESS NOTE ADULT - ATTENDING COMMENTS
59 F with T cell lymphoma on chemo m, htn, DM here with sepsis due to e coli bacteremia and found to have HAGMA/LOUISA/lactic acidosis due to DKA, yesterday with worsening lactic acidosis with concern for neck abscess and acute hypoxemic respiratory failure requiring intubation.      CT done shows necrotic neck mass with abscess, suspect this is driving her current process/illness  worsening acidosis, shock  daughter made her DNR overnight  patient is in multi system organ failure, family would not want RRT for her    # acute hypoxemic respiratory failure  # septic shock due to e coli bacteremia and neck abscess  # DKA  # acute metabolic encephalopathy due to septic shock  # neck abscess  - c/w full vent support and deep sedation  - c/w broad abx with ID help, changed to delores overnight for broader coverage pending abscess culture and repeat bcx  - c/w insulin gtt for now given concern she can't get OG tube feeds at this time    overall prognosis is poor given severe shock; ongoing GOC with family

## 2024-12-06 NOTE — PROGRESS NOTE ADULT - ASSESSMENT
59 y.o. Female with DMT2 on trulicity/metformin, HTN on amlodipine, and T cell lymphoma on chemo (with outpatient steroid) presents to ED with progressive SOB and weakness for 1 week and no PO intake in the last day. Admitted for DKA, neutropenic fever with sepsis (E.coli bacteremia).      1. DKA- h/o Type 2 DM with original Hba1c of 11.9% on 11/2/24 and now Hba1c is 10.6%   - Blood glucose target is 140 to 180 mg/dl  Being managed in MICU on insulin drip. Metabolic acidosis with severely increased lactate > 17. BHB checked =12/4 multiple times wnl and lactate is most likely cause of metabolic acidosis at this time. Added on BHB for today for completeness.  - Continue insulin drip in setting of critical illness and NPO  - Hypoglycemia Protocol   - Please follow up BARRY ab; SOHA negative, other Ab testing in lab (Zinc Transporter ab, and IA2-2)  - Will need DM teaching including insulin pen teach before discharge; transitions of care pharmacist following although per MICU prognosis is guarded at this time.  Discharge planning:   - TBD likely basal/bolus vs basal/trulicity/metformin   - Please send Lantus solostar pen and humalog kwikpen as test script to check insurance coverage.  Ok to send with current doses and update prior to d/c  If Lantus not covered- can try alternating with one of following   tresiba/basaglar/toujeo/Levemir  If Humalog not covered- can try alternating with one of following  novolog/apidra/admelog/fiasp  Ensure patient has working glucometer, test strips, lancets, alcohol pads, and BD yaneth pen needles  For severe hypoglycemia: Please prescribeGlucose tablets 4G (take 4 tablets) or 15G tablets for blood sugar less than 70 mG/dL repeat fingerstick in 15 minutes.   Please follow up at [facility]; [phone] [place] [address]; for medicine and endocrine appointment     2. HTN --> hypotension  -BP is at goal with IVFs  -Agree with holding Amlodipine for now  -History of high dose prednisone with chemo prior to admission - would rule out component of adrenal insufficiency - check random cortisol  Discussed with resident on 12/5 no result seen at this time.  Discussed with Dr. Gaxiola to order random cortisol today.      3. Hyperlipidemia/CV risk  -Lipids were at goal in 11/2024  -Will monitor for now and address statin use as outpatient    D/w MICU team   Patient is high risk and high level decision making, requiring ICU level of care.    Brooklyn Willams MD  Division of Endocrinology  Pager: [phone]    If after 6PM or before 9AM, or on weekends/holidays, please call endocrine answering service for assistance ([phone]).  For nonurgent matters email [email or web address] for assistance.

## 2024-12-06 NOTE — PROGRESS NOTE ADULT - PROBLEM SELECTOR PROBLEM 2
Hypertension
Type 2 diabetes mellitus with hyperglycemia
Type 2 diabetes mellitus with hyperglycemia

## 2024-12-06 NOTE — PROGRESS NOTE ADULT - SUBJECTIVE AND OBJECTIVE BOX
INTERVAL HPI/OVERNIGHT EVENTS:  Patient S&E at bedside. Overnight she has had declining clinical status.    REVIEW OF SYSTEMS: unable to obtain given mental status      VITAL SIGNS:  T(F): 100.9 (12-06-24 @ 08:00)  HR: 144 (12-06-24 @ 11:00)  BP: --  RR: 28 (12-06-24 @ 11:00)  SpO2: 100% (12-06-24 @ 10:37)  Wt(kg): --    PHYSICAL EXAM:  Constitutional: +intubated and sedated  Eyes: EOMI, sclera non-icteric  Neck: supple  Respiratory: CTA b/l  Cardiovascular: RRR  Gastrointestinal: soft, NTND  Extremities: +LE edema  Neurological: +sedated      MEDICATIONS  (STANDING):  chlorhexidine 0.12% Liquid 15 milliLiter(s) Oral Mucosa every 12 hours  chlorhexidine 2% Cloths 1 Application(s) Topical <User Schedule>  fentaNYL   Infusion. 0.5 MICROgram(s)/kG/Hr (4.79 mL/Hr) IV Continuous <Continuous>  insulin regular Infusion 1 Unit(s)/Hr (1 mL/Hr) IV Continuous <Continuous>  meropenem  IVPB 2000 milliGRAM(s) IV Intermittent every 8 hours  norepinephrine Infusion 0.05 MICROgram(s)/kG/Min (4.49 mL/Hr) IV Continuous <Continuous>  pantoprazole  Injectable 40 milliGRAM(s) IV Push two times a day  petrolatum Ophthalmic Ointment 1 Application(s) Both EYES two times a day  phenylephrine    Infusion 0.5 MICROgram(s)/kG/Min (8.98 mL/Hr) IV Continuous <Continuous>  propofol Infusion 20.007 MICROgram(s)/kG/Min (11.5 mL/Hr) IV Continuous <Continuous>  sodium bicarbonate  Infusion 0.235 mEq/kG/Hr (150 mL/Hr) IV Continuous <Continuous>  vasopressin Infusion 0.06 Unit(s)/Min (9 mL/Hr) IV Continuous <Continuous>    MEDICATIONS  (PRN):      Allergies    No Known Allergies    Intolerances        LABS:                        8.0    0.61  )-----------( 69       ( 06 Dec 2024 04:05 )             22.5     12-06    142  |  104  |  3[L]  ----------------------------<  136[H]  5.1   |  8[LL]  |  0.94    Ca    7.3[L]      06 Dec 2024 09:50  Phos  4.2     12-06  Mg     1.80     12-06    TPro  5.0[L]  /  Alb  1.8[L]  /  TBili  0.5  /  DBili  x   /  AST  <5  /  ALT  <5  /  AlkPhos  108  12-06    PT/INR - ( 06 Dec 2024 00:10 )   PT: 13.8 sec;   INR: 1.19 ratio         PTT - ( 06 Dec 2024 00:10 )  PTT:37.2 sec  Urinalysis Basic - ( 06 Dec 2024 09:50 )    Color: x / Appearance: x / SG: x / pH: x  Gluc: 136 mg/dL / Ketone: x  / Bili: x / Urobili: x   Blood: x / Protein: x / Nitrite: x   Leuk Esterase: x / RBC: x / WBC x   Sq Epi: x / Non Sq Epi: x / Bacteria: x        RADIOLOGY & ADDITIONAL TESTS:  Studies reviewed.

## 2024-12-06 NOTE — PROGRESS NOTE ADULT - CRITICAL CARE ATTENDING COMMENT
Critically ill patient requiring frequent bedside visits with therapy changes.

## 2024-12-06 NOTE — CHART NOTE - NSCHARTNOTEFT_GEN_A_CORE
DEATH NOTE    Called to bedside to evaluate the patient for asystole.    On physical exam, patient did not respond to verbal or noxious stimuli.  No spontaneous respirations.  Absent heart and breath sounds.  Absent radial and carotid pulses.   Pupils are fixed and dilated, no corneal reflex.  EKG rhythm strip shows asystole.   Patient pronounced dead at 3:37pm. Attending notified.

## 2024-12-06 NOTE — PROGRESS NOTE ADULT - REASON FOR ADMISSION
DKA, neutropenic fever

## 2024-12-06 NOTE — CHART NOTE - NSCHARTNOTEFT_GEN_A_CORE
Called family to discuss declining condition over the last few hours. In light of worsening acidosis, shock with escalating pressor requirements, discussed prognosis and treatment options with family. Unlikely that patient would tolerate CRRT and it would not resolve her underlying issue. In this scenario, the family would like to forego CRRT and in general, would like to forego interventions that "would put her through more suffering". After discussing with multiple other family members as well, the decision was made to make the patient DNR. Their priority at this point is to make family aware of her state and to ultimately come see the patient in the AM, and make further decisions regarding her care at that point. They wish to continue current management including vasopressors, antibiotics, IVF, etc.    Pavan Caceres  PGY-3, Internal Medicine  Available on [employer] Teams

## 2024-12-06 NOTE — PROGRESS NOTE ADULT - SUBJECTIVE AND OBJECTIVE BOX
Chief Complaint: DM2 in MICU    History: yesterday plan for tube feeds was cancelled in setting of GI bleed so insulin drip was continued  Patient remains NPO  Patient remains on pressors, intubated sedated    MEDICATIONS  (STANDING):  chlorhexidine 0.12% Liquid 15 milliLiter(s) Oral Mucosa every 12 hours  chlorhexidine 2% Cloths 1 Application(s) Topical <User Schedule>  fentaNYL   Infusion. 0.5 MICROgram(s)/kG/Hr (4.79 mL/Hr) IV Continuous <Continuous>  insulin regular Infusion 1 Unit(s)/Hr (1 mL/Hr) IV Continuous <Continuous>  meropenem  IVPB 2000 milliGRAM(s) IV Intermittent every 8 hours  norepinephrine Infusion 0.05 MICROgram(s)/kG/Min (4.49 mL/Hr) IV Continuous <Continuous>  pantoprazole  Injectable 40 milliGRAM(s) IV Push two times a day  petrolatum Ophthalmic Ointment 1 Application(s) Both EYES two times a day  phenylephrine    Infusion 0.5 MICROgram(s)/kG/Min (8.98 mL/Hr) IV Continuous <Continuous>  propofol Infusion 20.007 MICROgram(s)/kG/Min (11.5 mL/Hr) IV Continuous <Continuous>  sodium bicarbonate  Infusion 0.235 mEq/kG/Hr (150 mL/Hr) IV Continuous <Continuous>  vasopressin Infusion 0.06 Unit(s)/Min (9 mL/Hr) IV Continuous <Continuous>    MEDICATIONS  (PRN):      Allergies    No Known Allergies    Intolerances      Review of Systems:  UNABLE TO OBTAIN    PHYSICAL EXAM:  VITALS: T(C): 38.3 (12-06-24 @ 08:00)  T(F): 100.9 (12-06-24 @ 08:00), Max: 102.4 (12-05-24 @ 14:00)  HR: 144 (12-06-24 @ 11:00) (115 - 159)  BP: --  RR:  (12 - 30)  SpO2:  (97% - 100%)  GENERAL: sedated unresponsive  EYES: eyes closed, no proptosis  HEENT:  ETT in place  CV: tachycardia on tele  RESPIRATORY: on vent, nonlabored respirations    CAPILLARY BLOOD GLUCOSE      POCT Blood Glucose.: 138 mg/dL (06 Dec 2024 09:53)  POCT Blood Glucose.: 150 mg/dL (06 Dec 2024 08:08)  POCT Blood Glucose.: 152 mg/dL (06 Dec 2024 06:53)  POCT Blood Glucose.: 163 mg/dL (06 Dec 2024 06:04)  POCT Blood Glucose.: 166 mg/dL (06 Dec 2024 05:09)  POCT Blood Glucose.: 144 mg/dL (06 Dec 2024 04:05)  POCT Blood Glucose.: 148 mg/dL (06 Dec 2024 03:19)  POCT Blood Glucose.: 154 mg/dL (06 Dec 2024 02:03)  POCT Blood Glucose.: 137 mg/dL (06 Dec 2024 01:09)  POCT Blood Glucose.: 130 mg/dL (06 Dec 2024 00:15)  POCT Blood Glucose.: 137 mg/dL (05 Dec 2024 23:05)  POCT Blood Glucose.: 161 mg/dL (05 Dec 2024 22:10)  POCT Blood Glucose.: 149 mg/dL (05 Dec 2024 21:04)  POCT Blood Glucose.: 160 mg/dL (05 Dec 2024 20:00)  POCT Blood Glucose.: 129 mg/dL (05 Dec 2024 19:09)  POCT Blood Glucose.: 135 mg/dL (05 Dec 2024 17:58)  POCT Blood Glucose.: 119 mg/dL (05 Dec 2024 17:03)  POCT Blood Glucose.: 115 mg/dL (05 Dec 2024 16:13)  POCT Blood Glucose.: 103 mg/dL (05 Dec 2024 15:13)  POCT Blood Glucose.: 111 mg/dL (05 Dec 2024 13:57)  POCT Blood Glucose.: 104 mg/dL (05 Dec 2024 13:26)  POCT Blood Glucose.: 124 mg/dL (05 Dec 2024 11:52)      12-06    142  |  104  |  3[L]  ----------------------------<  136[H]  5.1   |  8[LL]  |  0.94    eGFR: 70    Ca    7.3[L]      12-06  Mg     1.80     12-06  Phos  4.2     12-06    TPro  5.0[L]  /  Alb  1.8[L]  /  TBili  0.5  /  DBili  x   /  AST  <5  /  ALT  <5  /  AlkPhos  108  12-06      A1C with Estimated Average Glucose Result: 10.6 % (12-03-24 @ 00:49)  A1C with Estimated Average Glucose Result: 12.2 % (12-02-24 @ 14:50)  A1C with Estimated Average Glucose Result: 11.9 % (11-02-24 @ 16:47)      Thyroid Function Tests:  12-03 @ 00:49 TSH 1.74 FreeT4 -- T3 -- Anti TPO -- Anti Thyroglobulin Ab -- TSI --

## 2024-12-06 NOTE — PROGRESS NOTE ADULT - ATTENDING COMMENTS
59F with DMT2 on trulicity/metformin, HTN on amlodipine, and recently diagnosed T cell lymphoma (dx 10/25/24) on BVCHP presents to ED with progressive SOB and weakness for 1 week, found to have DKA and E.Coli Bacteremia. Hematology consulted for T Cell Lymphoma. Respiratory failure on pressors.     # T Cell Lymphoma  # Neutropenic Fever  # E. Coli Bacteremia  # DKA  # Dark Stool  *GI PCR -ve  - Dx T cell lymphoma on 10/25/24 from left tonsillar mass biopsy. Initially got cytoxan+adriamycin on 11/2/24. After tumor board discussion, pt was switched to BVCHP regimen and received C1D1 on 11/6/24. C2D1 BVCHP was on 11/25/24 with Neulasta.  - [facility] hospitalization complicated by tachypnea requiring intubation and MICU admission  - CT Neck (12/4) showed previously demonstrated necrotic jayla mass in the left neck with increased fluid component and thick nodular rim of enhancement compared to prior exam on 10/25/2024. Overlying soft tissue edema and skin   thickening suggestive of superinfection of necrotic contents with suppuration/abscess formation.  - s/p needle aspiration with 25cc thick debris obtained 12/5 by ENT    Patient is DNR, awaiting forfamily to have further discussion of GOC.

## 2024-12-06 NOTE — PROGRESS NOTE ADULT - ASSESSMENT
59F with DMT2 on trulicity/metformin, HTN on amlodipine, and recently diagnosed T cell lymphoma (dx 10/25/24) on BVCHP presents to ED with progressive SOB and weakness for 1 week, found to have DKA and E.Coli Bacteremia. Hematology consulted for T Cell Lymphoma.       # T Cell Lymphoma  # Neutropenic Fever  # E. Coli Bacteremia  # DKA  # Dark Stool  *GI PCR -ve  - Dx T cell lymphoma on 10/25/24 from left tonsillar mass biopsy. Initially got cytoxan+adriamycin on 11/2/24. After tumor board discussion, pt was switched to BVCHP regimen and received C1D1 on 11/6/24. C2D1 BVCHP was on 11/25/24 with Neulasta.  - [facility] hospitalization complicated by tachypnea requiring intubation and MICU admission  - CT Neck (12/4) showed previously demonstrated necrotic jayla mass in the left neck with increased fluid component and thick nodular rim of enhancement compared to prior exam on 10/25/2024. Overlying soft tissue edema and skin   thickening suggestive of superinfection of necrotic contents with suppuration/abscess formation.  - s/p needle aspiration with 25cc thick debris obtained 12/5 by ENT    Recommend:  - as patient got Neulasta (peg-Filgrastrim) on 11/25/24 it can take upto two weeks to see increase in the WBC count. At this time, we will not give any futher GCSF support.  - continue with broad spectrum antibiotics, and ongoing management per MICU, pending GOC discussion with family  - Start PCJ and HSV/VZV prophylaxis if within GOC  - f/u BCx, stool culture, neck lesion fluid culture   - Transfuse to keep Hgb>7. Transfuse platelet if count <10k,  bleeding and count <50k  - Hematology will follow    Case discussed w/ Dr. Manju Mcfarlane, PGY-5  Fellow Hematology/Oncology  pager [phone]  Available on TEAMS  After 5pm or on weekends please contact  to page on-call fellow

## 2024-12-06 NOTE — PROGRESS NOTE ADULT - ASSESSMENT
MARY GIBSON is a 59y (1965) woman with a PMHx significant for T2DM, HTN, T cell lymphoma (Last chemo on 11/25 with Brentuximab/Doxorubicin/Cytoxan/onpro) who is admitted for DKA and found to be septic (E coli bacteremia). Code stroke called today for altered mental status, concern for possible intracranial bleed and facial twitching. Patient was admitted on 12/2, unknown mental state as baseline but she was reportedly A&Ox3 and confused. Per MICU team notes, mental status was slowly improving. Around 2:44pm code stroke called as she was found to not be following commands and appeared agitated which was drastic change from 12PM. Per staff she was noted to have facial twitching as well. Of note, patient has pancytopenia - however platelets count dropped to 8 this morning.      LKW: 12pm  NIHSS: 11  pre MRS: 0  Not a Teneceteplase candidate due to thrombocytopenia   Not a thrombectomy candidate due to low concern for LVO     Impression:  Likely toxic metabolic septic encephalopathy in setting of DKA and E. coli bacteremia. Given rapid decline in mental status and noted facial twitching, will evaluate for possible seizure       Recommendations:    - Continuous video EEG and monitor off anti-seizure medications for now   - MR brain w and wo ashley when medically able   - Address metabolic and infectious abnormalities as you are doing   - Neurochecks and vitals q4   - Maintain seizure, aspiration, fall precautions   - DVT: SCD for now, chemical DVT ppx when appropriate    MARY GIBSON is a 59y (1965) woman with a PMHx significant for T2DM, HTN, T cell lymphoma (Last chemo on 11/25 with Brentuximab/Doxorubicin/Cytoxan/onpro) who is admitted for DKA and found to be septic (E coli bacteremia). Code stroke called today for altered mental status, concern for possible intracranial bleed and facial twitching. Patient was admitted on 12/2, unknown mental state as baseline but she was reportedly A&Ox3 and confused. Per MICU team notes, mental status was slowly improving. Around 2:44pm code stroke called as she was found to not be following commands and appeared agitated which was drastic change from 12PM. Per staff she was noted to have facial twitching as well. Of note, patient has pancytopenia - however platelets count dropped to 8 this morning.      Impression:  Toxic metabolic septic encephalopathy in setting of DKA and E. coli bacteremia.   Given rapid decline in mental status and noted facial twitching, will evaluate for possible seizure       Recommendations:    - Discontinue EEG  - Address metabolic and infectious abnormalities as you are doing   - Neurochecks and vitals q4   - Maintaeizure, aspiration, fall precautions   - DVT: SCD for now, chemical DVT ppx when appropriate     Patient seen and discussed with Neurology attending Dr. Sams, note finalized upon attending attestation.  MARY GIBSON is a 59y (1965) woman with a PMHx significant for T2DM, HTN, T cell lymphoma (Last chemo on 11/25 with Brentuximab/Doxorubicin/Cytoxan/onpro) who is admitted for DKA and found to be septic (E coli bacteremia). Code stroke called today for altered mental status, concern for possible intracranial bleed and facial twitching. Patient was admitted on 12/2, unknown mental state as baseline but she was reportedly A&Ox3 and confused. Per MICU team notes, mental status was slowly improving. Around 2:44pm code stroke called as she was found to not be following commands and appeared agitated which was drastic change from 12PM. Per staff she was noted to have facial twitching as well. Of note, patient has pancytopenia - however platelets count dropped to 8.     Impression:  Toxic metabolic septic encephalopathy in setting of DKA, E. coli bacteremia, septic shock.   EEG without seizure but noted to have moderate generalized background slowing and generalized periodic discharges, at times with triphasic morphology. Prelim EEG from this morning with risk of generalized and partial-onset seizures and severe diffuse cerebral dysfunction. Patient with poor prognosis due to medical issues per MICU team     Recommendations:    - Discontinue EEG. Will not start anti-seizure medications at this time   - Address metabolic and infectious abnormalities as you are doing   - GOC per primary team   - No further inpatient neurologic workup at this time. Neurology to sign off, please page with any questions     Patient seen and discussed with Neurology attending Dr. Sams, note finalized upon attending attestation.

## 2024-12-06 NOTE — PROGRESS NOTE ADULT - ATTENDING COMMENTS
Ms. Martinez is a 60 yo woman with toxic metabolic septic encephalopathy.    I agree with work up and management as above.   D/W MICU team.  Neurology signing off. Please reconsult PRN or call [facility] [phone] with any questions.   Thank you.

## 2024-12-06 NOTE — PROGRESS NOTE ADULT - SUBJECTIVE AND OBJECTIVE BOX
Neurology Progress Note    SUBJECTIVE/OBJECTIVE/INTERVAL EVENTS: Patient seen and examined at bedside w/ neuro attending and team.     INTERVAL HISTORY:    REVIEW OF SYSTEMS: Otherwise denies fever, chills, headaches, vision changes, nausea, vomiting, hearing change, focal weakness, focal numbness, bowel/ bladder incontinence. Few questions of a 10-system ROS was performed and is negative except for those items noted above and/or in the HPI.    VITALS & EXAMINATION:  Vital Signs Last 24 Hrs  T(C): 38.3 (06 Dec 2024 08:00), Max: 39.1 (05 Dec 2024 14:00)  T(F): 100.9 (06 Dec 2024 08:00), Max: 102.4 (05 Dec 2024 14:00)  HR: 144 (06 Dec 2024 11:00) (117 - 159)  BP: --  BP(mean): --  RR: 28 (06 Dec 2024 11:00) (12 - 30)  SpO2: 100% (06 Dec 2024 10:37) (97% - 100%)    Parameters below as of 06 Dec 2024 09:00  Patient On (Oxygen Delivery Method): ventilator    O2 Concentration (%): 30      Physical Examination:    General - Lying in bed, agitated. Intermittent B/L facial tremor noted. Also noted with intermittent hand tremor   Eyes - Normal sclera, conjugate gaze     Neurologic Exam:  Mental status - Awake, attends to examiner and tracks eyes to voice. Does not follow commands. Verbal output "stop" only to noxious stimuli     Cranial nerves - PERRL 4mm >2mm B/L, blink to threat B/L, full lateral gaze B/L and crosses midline, facial strength intact without asymmetry b/l    Motor - Normal bulk and tone throughout.   Strength testing- Spontaneously moves all extremities.  B/L UE are antigravity to noxious stimuli      Sensation - Withdraws to noxious stimuli     DTR's - diffusely 1+ throughout, limited by mental status     Coordination - Unable to assess    Gait and station - Unable to assess       LABORATORY:  CBC                       8.0    0.61  )-----------( 69       ( 06 Dec 2024 04:05 )             22.5     Chem 12-06    142  |  104  |  3[L]  ----------------------------<  136[H]  5.1   |  8[LL]  |  0.94    Ca    7.3[L]      06 Dec 2024 09:50  Phos  4.2     12-06  Mg     1.80     12-06    TPro  5.0[L]  /  Alb  1.8[L]  /  TBili  0.5  /  DBili  x   /  AST  <5  /  ALT  <5  /  AlkPhos  108  12-06    LFTs LIVER FUNCTIONS - ( 06 Dec 2024 09:50 )  Alb: 1.8 g/dL / Pro: 5.0 g/dL / ALK PHOS: 108 U/L / ALT: <5 U/L / AST: <5 U/L / GGT: x           Coagulopathy PT/INR - ( 06 Dec 2024 00:10 )   PT: 13.8 sec;   INR: 1.19 ratio         PTT - ( 06 Dec 2024 00:10 )  PTT:37.2 sec  Lipid Panel 11-25 Chol 84 LDL -- HDL 15[L] Trig 121  A1c   Cardiac enzymes     U/A Urinalysis Basic - ( 06 Dec 2024 09:50 )    Color: x / Appearance: x / SG: x / pH: x  Gluc: 136 mg/dL / Ketone: x  / Bili: x / Urobili: x   Blood: x / Protein: x / Nitrite: x   Leuk Esterase: x / RBC: x / WBC x   Sq Epi: x / Non Sq Epi: x / Bacteria: x      CSF  Immunological  Other    STUDIES & IMAGING: (EEG, CT, MR, U/S, TTE/JEANA): Neurology Progress Note    SUBJECTIVE/OBJECTIVE/INTERVAL EVENTS: Patient seen and examined at bedside w/ neuro attending and team.     INTERVAL HISTORY:    REVIEW OF SYSTEMS:     VITALS & EXAMINATION:  Vital Signs Last 24 Hrs  T(C): 38.3 (06 Dec 2024 08:00), Max: 39.1 (05 Dec 2024 14:00)  T(F): 100.9 (06 Dec 2024 08:00), Max: 102.4 (05 Dec 2024 14:00)  HR: 144 (06 Dec 2024 11:00) (117 - 159)  BP: --  BP(mean): --  RR: 28 (06 Dec 2024 11:00) (12 - 30)  SpO2: 100% (06 Dec 2024 10:37) (97% - 100%)    Parameters below as of 06 Dec 2024 09:00  Patient On (Oxygen Delivery Method): ventilator    O2 Concentration (%): 30      Physical Examination:    General - Lying in bed, intubated   Eyes - scleral edema     Neurologic Exam:    Mental status - Intubated, no response to deep pain stimulus. No verbal output, does not follow commands     Cranial nerves - PERRL mm 3>2.5mm B/L. No EOM, scleral edema limits oculocephalic reflex. No cough or gag reflex.     Motor - Normal bulk and tone. No spontaneous movement, no movement to deep pain     DTR's - diffusely 1+ throughout, limited by mental status     Coordination - Unable to assess    Gait and station - Unable to assess       LABORATORY:  CBC                       8.0    0.61  )-----------( 69       ( 06 Dec 2024 04:05 )             22.5     Chem 12-06    142  |  104  |  3[L]  ----------------------------<  136[H]  5.1   |  8[LL]  |  0.94    Ca    7.3[L]      06 Dec 2024 09:50  Phos  4.2     12-06  Mg     1.80     12-06    TPro  5.0[L]  /  Alb  1.8[L]  /  TBili  0.5  /  DBili  x   /  AST  <5  /  ALT  <5  /  AlkPhos  108  12-06    LFTs LIVER FUNCTIONS - ( 06 Dec 2024 09:50 )  Alb: 1.8 g/dL / Pro: 5.0 g/dL / ALK PHOS: 108 U/L / ALT: <5 U/L / AST: <5 U/L / GGT: x           Coagulopathy PT/INR - ( 06 Dec 2024 00:10 )   PT: 13.8 sec;   INR: 1.19 ratio         PTT - ( 06 Dec 2024 00:10 )  PTT:37.2 sec  Lipid Panel 11-25 Chol 84 LDL -- HDL 15[L] Trig 121  A1c   Cardiac enzymes     U/A Urinalysis Basic - ( 06 Dec 2024 09:50 )    Color: x / Appearance: x / SG: x / pH: x  Gluc: 136 mg/dL / Ketone: x  / Bili: x / Urobili: x   Blood: x / Protein: x / Nitrite: x   Leuk Esterase: x / RBC: x / WBC x   Sq Epi: x / Non Sq Epi: x / Bacteria: x      CSF  Immunological  Other    STUDIES & IMAGING: (EEG, CT, MR, U/S, TTE/JEANA):      EEG 12/5/24  Summary:  Abnormal EEG in the awake / drowsy / asleep state(s).  1) Moderate generalized background slowing and generalized periodic discharges, at times with triphasic morphology and at other times more epileptiform in appearance, prior to 2:00.  2) Burst-attenuation pattern without epileptiform activity after 2:00.    Clinical Impression:  1) Diffuse or multifocal cerebral dysfunction and increased cortical irritability in the initial part of the recording.  2) Burst-attenuation pattern in the later part of the recording likely reflects effect of sedating medications.        Prelim EEG 12/6/24  EEG Classification / Summary:  Abnormal  EEG in the sedated state  1. Intermittent generalized spike discharges, at times fragmented, with vertex and L fronto-central fragmented spike/wave complexes favored  2. Generalized background slowing, severe     Clinical Impression:  1. Risk of generalized and partial-onset seizures; additionally, such findings can be seen in post-anoxic cerebral injury  2. Severe diffuse cerebral dysfunction; likely in the setting of chemical sedation  Neurology Progress Note    SUBJECTIVE/OBJECTIVE/INTERVAL EVENTS: Patient seen and examined at bedside w/ neuro attending and team. Patient unable to provide subjective as she is intubated and sedated.     INTERVAL HISTORY:    - Worsening lactic acidosis, requiring pressors, and patient now febrile  - Per [facility] with family, she is now DNR   - No seizures noted on EEG     REVIEW OF SYSTEMS:     VITALS & EXAMINATION:  Vital Signs Last 24 Hrs  T(C): 38.3 (06 Dec 2024 08:00), Max: 39.1 (05 Dec 2024 14:00)  T(F): 100.9 (06 Dec 2024 08:00), Max: 102.4 (05 Dec 2024 14:00)  HR: 144 (06 Dec 2024 11:00) (117 - 159)  BP: --  BP(mean): --  RR: 28 (06 Dec 2024 11:00) (12 - 30)  SpO2: 100% (06 Dec 2024 10:37) (97% - 100%)    Parameters below as of 06 Dec 2024 09:00  Patient On (Oxygen Delivery Method): ventilator    O2 Concentration (%): 30      Physical Examination:    General - Lying in bed, intubated   Eyes - scleral edema     Neurologic Exam:    Mental status - Intubated, no response to deep pain stimulus. No verbal output, does not follow commands     Cranial nerves - PERRL mm 3>2.5mm B/L. No EOM, scleral edema limits oculocephalic reflex. No cough or gag reflex.     Motor - Normal bulk and tone. No spontaneous movement, no movement to deep pain     DTR's - diffusely 1+ throughout, limited by mental status     Coordination - Unable to assess    Gait and station - Unable to assess       LABORATORY:  CBC                       8.0    0.61  )-----------( 69       ( 06 Dec 2024 04:05 )             22.5     Chem 12-06    142  |  104  |  3[L]  ----------------------------<  136[H]  5.1   |  8[LL]  |  0.94    Ca    7.3[L]      06 Dec 2024 09:50  Phos  4.2     12-06  Mg     1.80     12-06    TPro  5.0[L]  /  Alb  1.8[L]  /  TBili  0.5  /  DBili  x   /  AST  <5  /  ALT  <5  /  AlkPhos  108  12-06    LFTs LIVER FUNCTIONS - ( 06 Dec 2024 09:50 )  Alb: 1.8 g/dL / Pro: 5.0 g/dL / ALK PHOS: 108 U/L / ALT: <5 U/L / AST: <5 U/L / GGT: x           Coagulopathy PT/INR - ( 06 Dec 2024 00:10 )   PT: 13.8 sec;   INR: 1.19 ratio         PTT - ( 06 Dec 2024 00:10 )  PTT:37.2 sec  Lipid Panel 11-25 Chol 84 LDL -- HDL 15[L] Trig 121  A1c   Cardiac enzymes     U/A Urinalysis Basic - ( 06 Dec 2024 09:50 )    Color: x / Appearance: x / SG: x / pH: x  Gluc: 136 mg/dL / Ketone: x  / Bili: x / Urobili: x   Blood: x / Protein: x / Nitrite: x   Leuk Esterase: x / RBC: x / WBC x   Sq Epi: x / Non Sq Epi: x / Bacteria: x      CSF  Immunological  Other    STUDIES & IMAGING: (EEG, CT, MR, U/S, TTE/JEANA):      EEG 12/5/24  Summary:  Abnormal EEG in the awake / drowsy / asleep state(s).  1) Moderate generalized background slowing and generalized periodic discharges, at times with triphasic morphology and at other times more epileptiform in appearance, prior to 2:00.  2) Burst-attenuation pattern without epileptiform activity after 2:00.    Clinical Impression:  1) Diffuse or multifocal cerebral dysfunction and increased cortical irritability in the initial part of the recording.  2) Burst-attenuation pattern in the later part of the recording likely reflects effect of sedating medications.        Prelim EEG 12/6/24  EEG Classification / Summary:  Abnormal  EEG in the sedated state  1. Intermittent generalized spike discharges, at times fragmented, with vertex and L fronto-central fragmented spike/wave complexes favored  2. Generalized background slowing, severe     Clinical Impression:  1. Risk of generalized and partial-onset seizures; additionally, such findings can be seen in post-anoxic cerebral injury  2. Severe diffuse cerebral dysfunction; likely in the setting of chemical sedation  Neurology Progress Note    SUBJECTIVE/OBJECTIVE/INTERVAL EVENTS: Patient seen and examined at bedside w/ neuro attending and team. Patient unable to provide subjective as she is intubated and sedated.     INTERVAL HISTORY:    - Worsening lactic acidosis, requiring pressors, and patient now febrile  - Per [facility] with family, she is now DNR   - No seizures noted on EEG     REVIEW OF SYSTEMS:     VITALS & EXAMINATION:  Vital Signs Last 24 Hrs  T(C): 38.3 (06 Dec 2024 08:00), Max: 39.1 (05 Dec 2024 14:00)  T(F): 100.9 (06 Dec 2024 08:00), Max: 102.4 (05 Dec 2024 14:00)  HR: 144 (06 Dec 2024 11:00) (117 - 159)  BP: --  BP(mean): --  RR: 28 (06 Dec 2024 11:00) (12 - 30)  SpO2: 100% (06 Dec 2024 10:37) (97% - 100%)    Parameters below as of 06 Dec 2024 09:00  Patient On (Oxygen Delivery Method): ventilator    O2 Concentration (%): 30      Physical Examination:    General - Lying in bed, intubated   Eyes - scleral edema     Neurologic Exam:    Mental status - Intubated, no response to deep pain stimulus. No verbal output, does not follow commands     Cranial nerves - PERRL mm 3 -->2.5mm B/L. No EOM, scleral edema limits oculocephalic reflex. No cough or gag reflex.     Motor - Normal bulk and tone. No spontaneous movement, no movement to deep pain     Coordination - Unable to assess    Gait and station - Unable to assess       LABORATORY:  CBC                       8.0    0.61  )-----------( 69       ( 06 Dec 2024 04:05 )             22.5     Chem 12-06    142  |  104  |  3[L]  ----------------------------<  136[H]  5.1   |  8[LL]  |  0.94    Ca    7.3[L]      06 Dec 2024 09:50  Phos  4.2     12-06  Mg     1.80     12-06    TPro  5.0[L]  /  Alb  1.8[L]  /  TBili  0.5  /  DBili  x   /  AST  <5  /  ALT  <5  /  AlkPhos  108  12-06    LFTs LIVER FUNCTIONS - ( 06 Dec 2024 09:50 )  Alb: 1.8 g/dL / Pro: 5.0 g/dL / ALK PHOS: 108 U/L / ALT: <5 U/L / AST: <5 U/L / GGT: x           Coagulopathy PT/INR - ( 06 Dec 2024 00:10 )   PT: 13.8 sec;   INR: 1.19 ratio         PTT - ( 06 Dec 2024 00:10 )  PTT:37.2 sec  Lipid Panel 11-25 Chol 84 LDL -- HDL 15[L] Trig 121  A1c   Cardiac enzymes     U/A Urinalysis Basic - ( 06 Dec 2024 09:50 )    Color: x / Appearance: x / SG: x / pH: x  Gluc: 136 mg/dL / Ketone: x  / Bili: x / Urobili: x   Blood: x / Protein: x / Nitrite: x   Leuk Esterase: x / RBC: x / WBC x   Sq Epi: x / Non Sq Epi: x / Bacteria: x      CSF  Immunological  Other    STUDIES & IMAGING: (EEG, CT, MR, U/S, TTE/JEANA):      EEG 12/5/24  Summary:  Abnormal EEG in the awake / drowsy / asleep state(s).  1) Moderate generalized background slowing and generalized periodic discharges, at times with triphasic morphology and at other times more epileptiform in appearance, prior to 2:00.  2) Burst-attenuation pattern without epileptiform activity after 2:00.    Clinical Impression:  1) Diffuse or multifocal cerebral dysfunction and increased cortical irritability in the initial part of the recording.  2) Burst-attenuation pattern in the later part of the recording likely reflects effect of sedating medications.        Prelim EEG 12/6/24  EEG Classification / Summary:  Abnormal  EEG in the sedated state  1. Intermittent generalized spike discharges, at times fragmented, with vertex and L fronto-central fragmented spike/wave complexes favored  2. Generalized background slowing, severe     Clinical Impression:  1. Risk of generalized and partial-onset seizures; additionally, such findings can be seen in post-anoxic cerebral injury  2. Severe diffuse cerebral dysfunction; likely in the setting of chemical sedation

## 2024-12-06 NOTE — DISCHARGE NOTE FOR THE EXPIRED PATIENT - HOSPITAL COURSE
59y Female with DMT2 on trulicity/metformin, HTN on amlodipine, and lymphoma on chemo presents to ED with progressive SOB and weakness for 1 week and no PO intake in the last day. Found to be in DKA. Admitted to MICU for treatment of DKA and neutropenic fevers. Started on cefepime and transitioned to zosyn with E Coli bacteremia. Started on insulin gtt and was going to be transitioned, however developed AMS from AOx3 to AOx0 so stayed on insulin gtt. Code stroke called and imaging negative. CTH and abdomen/pelvis done. CT neck c/f necrotic contents c/f abscess formation. Lactate continued to rise despite broad spectrum antibiotics. Pressors uptitrated. ID consulted and continued on zosyn. ENT consulted for aspiration of neck fluid, performed 12/5. Antbiotics broadened to meropenem. Pressor requirements went up and started on bicarb gtt for metabolic acidosis. On 12/6 family decided to palliatively extubate and stop pressors given worsening clinical picture despite maximal intervention. Pt ultimately passed on 12/6 at 3:37pm.

## 2024-12-07 LAB
CULTURE RESULTS: NO GROWTH — SIGNIFICANT CHANGE UP
GRAM STN FLD: ABNORMAL
SPECIMEN SOURCE: SIGNIFICANT CHANGE UP

## 2024-12-08 LAB
CULTURE RESULTS: SIGNIFICANT CHANGE UP
SPECIMEN SOURCE: SIGNIFICANT CHANGE UP

## 2024-12-10 LAB
CULTURE RESULTS: ABNORMAL
CULTURE RESULTS: SIGNIFICANT CHANGE UP
CULTURE RESULTS: SIGNIFICANT CHANGE UP
SPECIMEN SOURCE: SIGNIFICANT CHANGE UP

## 2024-12-11 LAB — FIBRINOGEN AG PPP IA-MCNC: 468 MG/DL — SIGNIFICANT CHANGE UP (ref 233–496)

## 2024-12-17 LAB — ZINC TRANSPORTER 8 AB, RESULT: <15 U/ML — SIGNIFICANT CHANGE UP

## 2024-12-19 LAB
CORTICOSTEROID BINDING GLOBULIN RESULT: 1.4 MG/DL — LOW
CORTIS F/TOTAL MFR SERPL: 36 % — SIGNIFICANT CHANGE UP
CORTIS SERPL-MCNC: 14 UG/DL — SIGNIFICANT CHANGE UP
CORTISOL, FREE RESULT: 5 UG/DL — HIGH

## 2024-12-25 NOTE — ED ADULT TRIAGE NOTE - WEIGHT IN KG
You can access the FollowMyHealth Patient Portal offered by [facility] by registering at the following website: [email or web address]. By joining [facility]’s FollowMyHealth portal, you will also be able to view your health information using other applications (apps) compatible with our system.
[id number]

## 2025-01-28 NOTE — SWALLOW BEDSIDE ASSESSMENT ADULT - ORAL PREPARATORY PHASE
Within functional limits yes Slow/prolonged mastication (reported chewing, "carefully" as to not "bite my tongue").
